# Patient Record
Sex: FEMALE | Race: WHITE | NOT HISPANIC OR LATINO | Employment: UNEMPLOYED | URBAN - NONMETROPOLITAN AREA
[De-identification: names, ages, dates, MRNs, and addresses within clinical notes are randomized per-mention and may not be internally consistent; named-entity substitution may affect disease eponyms.]

---

## 2017-07-19 ENCOUNTER — TRANSCRIBE ORDERS (OUTPATIENT)
Dept: ADMINISTRATIVE | Facility: HOSPITAL | Age: 27
End: 2017-07-19

## 2017-07-19 DIAGNOSIS — N63.20 BREAST MASS, LEFT: Primary | ICD-10-CM

## 2017-09-05 ENCOUNTER — HOSPITAL ENCOUNTER (EMERGENCY)
Facility: HOSPITAL | Age: 27
Discharge: HOME | End: 2017-09-05
Attending: EMERGENCY MEDICINE | Admitting: EMERGENCY MEDICINE

## 2017-09-05 ENCOUNTER — APPOINTMENT (OUTPATIENT)
Dept: ULTRASOUND IMAGING | Facility: HOSPITAL | Age: 27
End: 2017-09-05

## 2017-09-05 VITALS
DIASTOLIC BLOOD PRESSURE: 77 MMHG | BODY MASS INDEX: 24.66 KG/M2 | HEIGHT: 65 IN | OXYGEN SATURATION: 100 % | RESPIRATION RATE: 17 BRPM | SYSTOLIC BLOOD PRESSURE: 124 MMHG | TEMPERATURE: 98.7 F | HEART RATE: 107 BPM | WEIGHT: 148 LBS

## 2017-09-05 DIAGNOSIS — O46.90 VAGINAL BLEEDING DURING PREGNANCY, ANTEPARTUM: Primary | ICD-10-CM

## 2017-09-05 LAB
6-ACETYL MORPHINE: NEGATIVE
ABO GROUP BLD: NORMAL
AMPHET+METHAMPHET UR QL: POSITIVE
B-HCG UR QL: POSITIVE
BACTERIA UR QL AUTO: ABNORMAL /HPF
BARBITURATES UR QL SCN: NEGATIVE
BENZODIAZ UR QL SCN: NEGATIVE
BILIRUB UR QL STRIP: NEGATIVE
BLD GP AB SCN SERPL QL: NEGATIVE
BUPRENORPHINE SERPL-MCNC: NEGATIVE NG/ML
CANNABINOIDS SERPL QL: NEGATIVE
CLARITY UR: ABNORMAL
COCAINE UR QL: NEGATIVE
COLOR UR: YELLOW
GLUCOSE UR STRIP-MCNC: NEGATIVE MG/DL
HCG INTACT+B SERPL-ACNC: ABNORMAL MIU/ML (ref 0–5)
HGB UR QL STRIP.AUTO: ABNORMAL
HYALINE CASTS UR QL AUTO: ABNORMAL /LPF
KETONES UR QL STRIP: NEGATIVE
LEUKOCYTE ESTERASE UR QL STRIP.AUTO: ABNORMAL
METHADONE UR QL SCN: NEGATIVE
NITRITE UR QL STRIP: NEGATIVE
NUMBER OF DOSES: NORMAL
OPIATES UR QL: NEGATIVE
OXYCODONE UR QL SCN: NEGATIVE
PCP UR QL SCN: NEGATIVE
PH UR STRIP.AUTO: 5.5 [PH] (ref 5–8)
PROT UR QL STRIP: NEGATIVE
RBC # UR: ABNORMAL /HPF
REF LAB TEST METHOD: ABNORMAL
RH BLD: POSITIVE
SP GR UR STRIP: 1.01 (ref 1–1.03)
SQUAMOUS #/AREA URNS HPF: ABNORMAL /HPF
UROBILINOGEN UR QL STRIP: ABNORMAL
WBC UR QL AUTO: ABNORMAL /HPF

## 2017-09-05 PROCEDURE — 80307 DRUG TEST PRSMV CHEM ANLYZR: CPT | Performed by: EMERGENCY MEDICINE

## 2017-09-05 PROCEDURE — 76805 OB US >/= 14 WKS SNGL FETUS: CPT | Performed by: RADIOLOGY

## 2017-09-05 PROCEDURE — 76805 OB US >/= 14 WKS SNGL FETUS: CPT

## 2017-09-05 PROCEDURE — 86850 RBC ANTIBODY SCREEN: CPT | Performed by: PHYSICIAN ASSISTANT

## 2017-09-05 PROCEDURE — 86901 BLOOD TYPING SEROLOGIC RH(D): CPT | Performed by: PHYSICIAN ASSISTANT

## 2017-09-05 PROCEDURE — 84702 CHORIONIC GONADOTROPIN TEST: CPT | Performed by: PHYSICIAN ASSISTANT

## 2017-09-05 PROCEDURE — 86900 BLOOD TYPING SEROLOGIC ABO: CPT | Performed by: PHYSICIAN ASSISTANT

## 2017-09-05 PROCEDURE — 87086 URINE CULTURE/COLONY COUNT: CPT | Performed by: EMERGENCY MEDICINE

## 2017-09-05 PROCEDURE — 81001 URINALYSIS AUTO W/SCOPE: CPT | Performed by: EMERGENCY MEDICINE

## 2017-09-05 PROCEDURE — 81025 URINE PREGNANCY TEST: CPT | Performed by: EMERGENCY MEDICINE

## 2017-09-05 PROCEDURE — 99284 EMERGENCY DEPT VISIT MOD MDM: CPT

## 2017-09-06 NOTE — ED PROVIDER NOTES
Subjective   HPI Comments: 26 yo WF, presented to ER w/ two day hx of vaginal bleeding.  Pt admitted to being 18 weeks pregnant.  Pt is A1.  Pt admitted that she is bleeding through less than one pad per day.  Denied NV.        Review of Systems   Constitutional: Negative.  Negative for fever.   HENT: Negative.    Respiratory: Negative.    Cardiovascular: Negative.  Negative for chest pain.   Gastrointestinal: Negative.  Negative for abdominal pain.   Endocrine: Negative.    Genitourinary: Negative.  Negative for dysuria.   Skin: Negative.    Neurological: Negative.    Psychiatric/Behavioral: Negative.    All other systems reviewed and are negative.      No past medical history on file.    Allergies   Allergen Reactions   • Diphenhydramine    • Sulfamethoxazole-Trimethoprim        Past Surgical History:   Procedure Laterality Date   • APPENDECTOMY         No family history on file.    Social History     Social History   • Marital status: Single     Spouse name: N/A   • Number of children: N/A   • Years of education: N/A     Social History Main Topics   • Smoking status: Current Every Day Smoker     Packs/day: 0.50   • Smokeless tobacco: Not on file   • Alcohol use No   • Drug use: No   • Sexual activity: Defer     Other Topics Concern   • Not on file     Social History Narrative   • No narrative on file           Objective   Physical Exam   Constitutional: She is oriented to person, place, and time. She appears well-developed and well-nourished. No distress.   HENT:   Head: Normocephalic and atraumatic.   Nose: Nose normal.   Eyes: Conjunctivae and EOM are normal. Pupils are equal, round, and reactive to light.   Neck: Normal range of motion. Neck supple. No JVD present. No tracheal deviation present.   Cardiovascular: Normal rate, regular rhythm and normal heart sounds.    No murmur heard.  Pulmonary/Chest: Effort normal and breath sounds normal. No respiratory distress. She has no wheezes.   Abdominal: Soft.  Bowel sounds are normal. There is no tenderness.   Musculoskeletal: Normal range of motion. She exhibits no edema or deformity.   Neurological: She is alert and oriented to person, place, and time. No cranial nerve deficit.   Skin: Skin is warm and dry. No rash noted. She is not diaphoretic. No erythema. No pallor.   Psychiatric: She has a normal mood and affect. Her behavior is normal. Thought content normal.   Nursing note and vitals reviewed.      Procedures         ED Course  ED Course   Comment By Time   US [facility]:  Single living intrauterine pregnancy. MOSES Medina 09/05 2237                  [facility]  Number of Diagnoses or Management Options  new and requires workup     Amount and/or Complexity of Data Reviewed  Clinical lab tests: ordered and reviewed  Tests in the radiology section of CPT®: ordered and reviewed    Risk of Complications, Morbidity, and/or Mortality  Presenting problems: moderate  Diagnostic procedures: moderate  Management options: low    Patient Progress  Patient progress: stable      Final diagnoses:   Vaginal bleeding during pregnancy, antepartum            MOSES Medina  09/05/17 2248

## 2017-09-08 LAB — BACTERIA SPEC AEROBE CULT: NO GROWTH

## 2020-07-01 ENCOUNTER — HOSPITAL ENCOUNTER (EMERGENCY)
Facility: HOSPITAL | Age: 30
Discharge: LEFT BEFORE DISCHARGE | End: 2020-07-02

## 2020-07-01 VITALS
DIASTOLIC BLOOD PRESSURE: 61 MMHG | OXYGEN SATURATION: 96 % | HEIGHT: 65 IN | WEIGHT: 182 LBS | HEART RATE: 104 BPM | TEMPERATURE: 97.2 F | BODY MASS INDEX: 30.32 KG/M2 | RESPIRATION RATE: 18 BRPM | SYSTOLIC BLOOD PRESSURE: 152 MMHG

## 2020-07-02 NOTE — ED NOTES
Called pt for reassessment again at this time with no answer.      Gisella Damian RN  07/02/20 0208

## 2022-10-24 ENCOUNTER — APPOINTMENT (OUTPATIENT)
Dept: GENERAL RADIOLOGY | Facility: HOSPITAL | Age: 32
End: 2022-10-24

## 2022-10-24 LAB
ALBUMIN SERPL-MCNC: 4.7 G/DL (ref 3.5–5.2)
ALBUMIN/GLOB SERPL: 1.5 G/DL
ALP SERPL-CCNC: 95 U/L (ref 39–117)
ALT SERPL W P-5'-P-CCNC: 11 U/L (ref 1–33)
ANION GAP SERPL CALCULATED.3IONS-SCNC: 13.8 MMOL/L (ref 5–15)
AST SERPL-CCNC: 24 U/L (ref 1–32)
BASOPHILS # BLD AUTO: 0.09 10*3/MM3 (ref 0–0.2)
BASOPHILS NFR BLD AUTO: 1.3 % (ref 0–1.5)
BILIRUB SERPL-MCNC: 0.2 MG/DL (ref 0–1.2)
BUN SERPL-MCNC: 10 MG/DL (ref 6–20)
BUN/CREAT SERPL: 10.6 (ref 7–25)
CALCIUM SPEC-SCNC: 9.6 MG/DL (ref 8.6–10.5)
CHLORIDE SERPL-SCNC: 101 MMOL/L (ref 98–107)
CO2 SERPL-SCNC: 22.2 MMOL/L (ref 22–29)
CREAT SERPL-MCNC: 0.94 MG/DL (ref 0.57–1)
DEPRECATED RDW RBC AUTO: 39 FL (ref 37–54)
EGFRCR SERPLBLD CKD-EPI 2021: 83.9 ML/MIN/1.73
EOSINOPHIL # BLD AUTO: 0.49 10*3/MM3 (ref 0–0.4)
EOSINOPHIL NFR BLD AUTO: 7.2 % (ref 0.3–6.2)
ERYTHROCYTE [DISTWIDTH] IN BLOOD BY AUTOMATED COUNT: 15.5 % (ref 12.3–15.4)
ETHANOL BLD-MCNC: <10 MG/DL (ref 0–10)
ETHANOL UR QL: <0.01 %
GLOBULIN UR ELPH-MCNC: 3.2 GM/DL
GLUCOSE SERPL-MCNC: 88 MG/DL (ref 65–99)
HCT VFR BLD AUTO: 33.5 % (ref 34–46.6)
HGB BLD-MCNC: 9.4 G/DL (ref 12–15.9)
HOLD SPECIMEN: NORMAL
HOLD SPECIMEN: NORMAL
HYPOCHROMIA BLD QL: NORMAL
IMM GRANULOCYTES # BLD AUTO: 0.02 10*3/MM3 (ref 0–0.05)
IMM GRANULOCYTES NFR BLD AUTO: 0.3 % (ref 0–0.5)
LIPASE SERPL-CCNC: 19 U/L (ref 13–60)
LYMPHOCYTES # BLD AUTO: 1.28 10*3/MM3 (ref 0.7–3.1)
LYMPHOCYTES NFR BLD AUTO: 18.8 % (ref 19.6–45.3)
MCH RBC QN AUTO: 19.8 PG (ref 26.6–33)
MCHC RBC AUTO-ENTMCNC: 28.1 G/DL (ref 31.5–35.7)
MCV RBC AUTO: 70.7 FL (ref 79–97)
MICROCYTES BLD QL: NORMAL
MONOCYTES # BLD AUTO: 0.42 10*3/MM3 (ref 0.1–0.9)
MONOCYTES NFR BLD AUTO: 6.2 % (ref 5–12)
NEUTROPHILS NFR BLD AUTO: 4.51 10*3/MM3 (ref 1.7–7)
NEUTROPHILS NFR BLD AUTO: 66.2 % (ref 42.7–76)
NRBC BLD AUTO-RTO: 0 /100 WBC (ref 0–0.2)
PLAT MORPH BLD: NORMAL
PLATELET # BLD AUTO: 254 10*3/MM3 (ref 140–450)
PMV BLD AUTO: 12 FL (ref 6–12)
POLYCHROMASIA BLD QL SMEAR: NORMAL
POTASSIUM SERPL-SCNC: 3.7 MMOL/L (ref 3.5–5.2)
PROT SERPL-MCNC: 7.9 G/DL (ref 6–8.5)
QT INTERVAL: 344 MS
QTC INTERVAL: 446 MS
RBC # BLD AUTO: 4.74 10*6/MM3 (ref 3.77–5.28)
SODIUM SERPL-SCNC: 137 MMOL/L (ref 136–145)
STOMATOCYTES BLD QL SMEAR: NORMAL
TROPONIN T SERPL-MCNC: <0.01 NG/ML (ref 0–0.03)
WBC NRBC COR # BLD: 6.81 10*3/MM3 (ref 3.4–10.8)
WHOLE BLOOD HOLD COAG: NORMAL
WHOLE BLOOD HOLD SPECIMEN: NORMAL

## 2022-10-24 PROCEDURE — 80053 COMPREHEN METABOLIC PANEL: CPT | Performed by: PHYSICIAN ASSISTANT

## 2022-10-24 PROCEDURE — 85007 BL SMEAR W/DIFF WBC COUNT: CPT | Performed by: PHYSICIAN ASSISTANT

## 2022-10-24 PROCEDURE — 82077 ASSAY SPEC XCP UR&BREATH IA: CPT | Performed by: PHYSICIAN ASSISTANT

## 2022-10-24 PROCEDURE — 85025 COMPLETE CBC W/AUTO DIFF WBC: CPT | Performed by: PHYSICIAN ASSISTANT

## 2022-10-24 PROCEDURE — 84484 ASSAY OF TROPONIN QUANT: CPT | Performed by: PHYSICIAN ASSISTANT

## 2022-10-24 PROCEDURE — 71045 X-RAY EXAM CHEST 1 VIEW: CPT

## 2022-10-24 PROCEDURE — 93005 ELECTROCARDIOGRAM TRACING: CPT | Performed by: PHYSICIAN ASSISTANT

## 2022-10-24 PROCEDURE — 36415 COLL VENOUS BLD VENIPUNCTURE: CPT

## 2022-10-24 PROCEDURE — 83690 ASSAY OF LIPASE: CPT | Performed by: PHYSICIAN ASSISTANT

## 2022-10-24 PROCEDURE — 99284 EMERGENCY DEPT VISIT MOD MDM: CPT

## 2022-10-24 RX ORDER — SODIUM CHLORIDE 0.9 % (FLUSH) 0.9 %
10 SYRINGE (ML) INJECTION AS NEEDED
Status: DISCONTINUED | OUTPATIENT
Start: 2022-10-24 | End: 2022-10-25 | Stop reason: HOSPADM

## 2022-10-24 RX ORDER — ASPIRIN 81 MG/1
324 TABLET, CHEWABLE ORAL ONCE
Status: DISCONTINUED | OUTPATIENT
Start: 2022-10-24 | End: 2022-10-25 | Stop reason: HOSPADM

## 2022-10-25 ENCOUNTER — HOSPITAL ENCOUNTER (EMERGENCY)
Facility: HOSPITAL | Age: 32
Discharge: HOME | End: 2022-10-25
Attending: STUDENT IN AN ORGANIZED HEALTH CARE EDUCATION/TRAINING PROGRAM | Admitting: STUDENT IN AN ORGANIZED HEALTH CARE EDUCATION/TRAINING PROGRAM

## 2022-10-25 VITALS
OXYGEN SATURATION: 99 % | HEART RATE: 98 BPM | DIASTOLIC BLOOD PRESSURE: 76 MMHG | TEMPERATURE: 98 F | RESPIRATION RATE: 18 BRPM | SYSTOLIC BLOOD PRESSURE: 122 MMHG | HEIGHT: 65 IN | WEIGHT: 157 LBS | BODY MASS INDEX: 26.16 KG/M2

## 2022-10-25 DIAGNOSIS — R13.10 PAINFUL SWALLOWING: Primary | ICD-10-CM

## 2022-10-25 LAB — TROPONIN T SERPL-MCNC: <0.01 NG/ML (ref 0–0.03)

## 2022-10-25 PROCEDURE — 84484 ASSAY OF TROPONIN QUANT: CPT | Performed by: PHYSICIAN ASSISTANT

## 2022-10-25 RX ORDER — LIDOCAINE HYDROCHLORIDE 20 MG/ML
15 SOLUTION OROPHARYNGEAL ONCE
Status: COMPLETED | OUTPATIENT
Start: 2022-10-25 | End: 2022-10-25

## 2022-10-25 RX ORDER — ALUMINA, MAGNESIA, AND SIMETHICONE 2400; 2400; 240 MG/30ML; MG/30ML; MG/30ML
15 SUSPENSION ORAL ONCE
Status: COMPLETED | OUTPATIENT
Start: 2022-10-25 | End: 2022-10-25

## 2022-10-25 RX ORDER — PANTOPRAZOLE SODIUM 40 MG/1
40 TABLET, DELAYED RELEASE ORAL DAILY
Qty: 30 TABLET | Refills: 0 | Status: SHIPPED | OUTPATIENT
Start: 2022-10-25

## 2022-10-25 RX ORDER — SIMETHICONE 40MG/0.6ML
5 SUSPENSION, DROPS(FINAL DOSAGE FORM)(ML) ORAL 2 TIMES DAILY PRN
Qty: 355 ML | Refills: 0 | Status: SHIPPED | OUTPATIENT
Start: 2022-10-25

## 2022-10-25 RX ADMIN — ALUMINUM HYDROXIDE, MAGNESIUM HYDROXIDE, AND DIMETHICONE 15 ML: 400; 400; 40 SUSPENSION ORAL at 00:43

## 2022-10-25 RX ADMIN — LIDOCAINE HYDROCHLORIDE 15 ML: 20 SOLUTION ORAL; TOPICAL at 00:43

## 2022-10-25 NOTE — ED NOTES
MEDICAL SCREENING:    Reason for Visit: chest pain; sensation of food stuck in esophagus x 4 days     Patient initially seen in triage.  The patient was advised further evaluation and diagnostic testing will be needed, some of the treatment and testing will be initiated in the lobby in order to begin the process.  The patient will be returned to the waiting area for the time being and possibly be re-assessed by a subsequent ED provider.  The patient will be brought back to the treatment area in as timely manner as possible.       Deb White PA  10/24/22 4815

## 2022-10-25 NOTE — ED NOTES
Updated pt on reason for delay at this time; awaiting treatment room due to high pt volume, understanding verbalized.

## 2022-10-25 NOTE — ED PROVIDER NOTES
"Subjective     History provided by:  Patient  Difficulty Swallowing  Location:  Mid sternal  Quality:  \"feels like food is getting stuck\"  Severity:  Mild  Onset quality:  Gradual  Timing:  Intermittent  Progression:  Worsening  Chronicity:  Recurrent  Associated symptoms: no abdominal pain, no chest pain and no fever        Review of Systems   Constitutional: Negative.  Negative for fever.   HENT: Positive for trouble swallowing.    Respiratory: Negative.    Cardiovascular: Negative.  Negative for chest pain.   Gastrointestinal: Negative.  Negative for abdominal pain.   Endocrine: Negative.    Genitourinary: Negative.  Negative for dysuria.   Skin: Negative.    Neurological: Negative.    Psychiatric/Behavioral: Negative.    All other systems reviewed and are negative.      No past medical history on file.    Allergies   Allergen Reactions   • Diphenhydramine    • Sulfamethoxazole-Trimethoprim        Past Surgical History:   Procedure Laterality Date   • APPENDECTOMY         No family history on file.    Social History     Socioeconomic History   • Marital status: Single   Tobacco Use   • Smoking status: Every Day     Packs/day: 0.50     Types: Cigarettes   Substance and Sexual Activity   • Alcohol use: No   • Drug use: No   • Sexual activity: Defer           Objective   Physical Exam  Vitals and nursing note reviewed.   Constitutional:       General: She is not in acute distress.     Appearance: She is well-developed. She is not diaphoretic.   HENT:      Head: Normocephalic and atraumatic.      Right Ear: External ear normal.      Left Ear: External ear normal.      Nose: Nose normal.   Eyes:      Conjunctiva/sclera: Conjunctivae normal.      Pupils: Pupils are equal, round, and reactive to light.   Neck:      Vascular: No JVD.      Trachea: No tracheal deviation.   Cardiovascular:      Rate and Rhythm: Normal rate and regular rhythm.      Heart sounds: No murmur heard.  Pulmonary:      Effort: Pulmonary effort is " normal. No respiratory distress.      Breath sounds: No wheezing.   Abdominal:      Palpations: Abdomen is soft.      Tenderness: There is no abdominal tenderness.   Musculoskeletal:         General: No deformity. Normal range of motion.      Cervical back: Normal range of motion and neck supple.   Skin:     General: Skin is warm and dry.      Coloration: Skin is not pale.      Findings: No erythema or rash.   Neurological:      Mental Status: She is alert and oriented to person, place, and time.      Cranial Nerves: No cranial nerve deficit.   Psychiatric:         Behavior: Behavior normal.         Thought Content: Thought content normal.         Procedures           ED Course  ED Course as of 10/25/22 0110   Mon Oct 24, 2022   2235 EKG at 2232 sinus tachycardia 101 bpm, , cures 90, QTc 446, regular axis, no significant ST deviation or T wave abnormalities concerning for acute ischemia. [KP]   e Oct 25, 2022   0030 CXR rad interpreted:  No acute cardiopulmonary findings.    [RB]   0107 Patient experienced relief with a GI cocktail.  Patient will be discharged to follow-up with general surgery for outpatient EGD.  Patient will also be started on a PPI for symptomatic control. [RB]      ED Course User Index  [KP] Alexsander Chowdhury MD  [RB] Usman Salter II, PA                                           MDM  Number of Diagnoses or Management Options  Painful swallowing: new and requires workup     Amount and/or Complexity of Data Reviewed  Clinical lab tests: ordered and reviewed  Tests in the radiology section of CPT®: ordered and reviewed  Decide to obtain previous medical records or to obtain history from someone other than the patient: yes    Risk of Complications, Morbidity, and/or Mortality  Presenting problems: low  Diagnostic procedures: low  Management options: low    Patient Progress  Patient progress: stable      Final diagnoses:   Painful swallowing       ED Disposition  ED Disposition     ED  Disposition   Discharge    Condition   Stable    Comment   --             Jayro Bar MD  [address] [id number] [phone]    Schedule an appointment as soon as possible for a visit            Medication List      New Prescriptions    Mylanta Coat & Cool 1200-270-80 MG/10ML suspension  Generic drug: Catrachito Carb-Mag Hydrox-Simeth  Take 5 mL by mouth 2 (Two) Times a Day As Needed (indigestion).     pantoprazole 40 MG EC tablet  Commonly known as: PROTONIX  Take 1 tablet by mouth Daily.           Where to Get Your Medications      These medications were sent to [id number] - [address] - [phone] PH - [phone] [address] [address]    Phone: [phone]   · Mylanta Coat & Cool 1200-270-80 MG/10ML suspension  · pantoprazole 40 MG EC tablet          Usman Salter II, PA  10/25/22 0110

## 2022-10-27 ENCOUNTER — OFFICE VISIT (OUTPATIENT)
Dept: SURGERY | Facility: CLINIC | Age: 32
End: 2022-10-27

## 2022-10-27 VITALS — WEIGHT: 150 LBS | HEIGHT: 65 IN | BODY MASS INDEX: 24.99 KG/M2

## 2022-10-27 DIAGNOSIS — R13.19 ESOPHAGEAL DYSPHAGIA: Primary | ICD-10-CM

## 2022-10-27 PROCEDURE — 99203 OFFICE O/P NEW LOW 30 MIN: CPT | Performed by: SURGERY

## 2022-10-27 RX ORDER — BUPRENORPHINE HYDROCHLORIDE AND NALOXONE HYDROCHLORIDE DIHYDRATE 8; 2 MG/1; MG/1
TABLET SUBLINGUAL
COMMUNITY
Start: 2022-10-25

## 2022-10-27 NOTE — PROGRESS NOTES
Subjective   Julieta Beasley is a 32 y.o. female.     Chief Complaint: dysphagia    History of Present Illness She is a 33 yo who had trouble swallowing and was seen in the ER a few days ago. She has pain and feels like food gets stuck at times. She is on protonix now.    The following portions of the patient's history were reviewed and updated as appropriate: current medications, past family history, past medical history, past social history, past surgical history and problem list.    Review of Systems   Constitutional: Negative for activity change, appetite change, chills, fever and unexpected weight change.   HENT: Positive for trouble swallowing. Negative for congestion, facial swelling and sore throat.    Eyes: Negative for photophobia and visual disturbance.   Respiratory: Negative for chest tightness, shortness of breath and wheezing.    Cardiovascular: Negative for chest pain, palpitations and leg swelling.   Gastrointestinal: Negative for abdominal distention, abdominal pain, anal bleeding, blood in stool, constipation, diarrhea, nausea, rectal pain and vomiting.   Endocrine: Negative for cold intolerance, heat intolerance, polydipsia and polyuria.   Genitourinary: Negative for difficulty urinating, dysuria, flank pain and urgency.   Musculoskeletal: Negative for back pain and myalgias.   Skin: Negative for rash and wound.   Allergic/Immunologic: Negative for immunocompromised state.   Neurological: Negative for dizziness, seizures, syncope, light-headedness, numbness and headaches.   Hematological: Negative for adenopathy. Does not bruise/bleed easily.   Psychiatric/Behavioral: Negative for behavioral problems and confusion. The patient is not nervous/anxious.        Objective   Physical Exam  Vitals reviewed.   Constitutional:       General: She is not in acute distress.     Appearance: She is well-developed. She is not ill-appearing.   HENT:      Head: Normocephalic. No laceration. Hair is normal.       Right Ear: Hearing and ear canal normal.      Left Ear: Hearing and ear canal normal.      Nose: Nose normal.      Right Sinus: No maxillary sinus tenderness or frontal sinus tenderness.      Left Sinus: No maxillary sinus tenderness or frontal sinus tenderness.   Eyes:      General: Lids are normal.      Conjunctiva/sclera: Conjunctivae normal.      Pupils: Pupils are equal, round, and reactive to light.   Neck:      Thyroid: No thyroid mass or thyromegaly.      Vascular: No JVD.      Trachea: No tracheal tenderness or tracheal deviation.   Cardiovascular:      Rate and Rhythm: Normal rate and regular rhythm.      Heart sounds: No murmur heard.    No gallop.   Pulmonary:      Effort: Pulmonary effort is normal.      Breath sounds: Normal breath sounds. No stridor. No wheezing.   Chest:      Chest wall: No tenderness.   Abdominal:      General: Bowel sounds are normal. There is no distension.      Palpations: Abdomen is soft. There is no mass.      Tenderness: There is no abdominal tenderness. There is no guarding or rebound.      Hernia: No hernia is present.   Musculoskeletal:         General: No deformity.      Cervical back: Normal range of motion.   Lymphadenopathy:      Cervical: No cervical adenopathy.      Upper Body:      Right upper body: No supraclavicular adenopathy.      Left upper body: No supraclavicular adenopathy.   Skin:     General: Skin is warm and dry.      Coloration: Skin is not pale.      Findings: No erythema or rash.   Neurological:      Mental Status: She is alert and oriented to person, place, and time.      Motor: No abnormal muscle tone.   Psychiatric:         Behavior: Behavior normal.         Thought Content: Thought content normal.         No past medical history on file.    No family history on file.    Social History     Tobacco Use   • Smoking status: Every Day     Packs/day: 0.50     Types: Cigarettes   Substance Use Topics   • Alcohol use: No   • Drug use: No       Past Surgical  History:   Procedure Laterality Date   • APPENDECTOMY         Current Outpatient Medications   Medication Instructions   • Catrachito Carb-Mag Hydrox-Simeth (Mylanta Coat & Cool) 1200-270-80 MG/10ML suspension 5 mL, Oral, 2 Times Daily PRN   • pantoprazole (PROTONIX) 40 mg, Oral, Daily         Assessment & Plan   Diagnoses and all orders for this visit:    1. Esophageal dysphagia (Primary)    Schedule EGD

## 2022-10-28 ENCOUNTER — ANESTHESIA EVENT (OUTPATIENT)
Dept: PERIOP | Facility: HOSPITAL | Age: 32
End: 2022-10-28

## 2022-10-28 ENCOUNTER — HOSPITAL ENCOUNTER (OUTPATIENT)
Facility: HOSPITAL | Age: 32
Setting detail: HOSPITAL OUTPATIENT SURGERY
Discharge: HOME | End: 2022-10-28
Attending: SURGERY | Admitting: SURGERY

## 2022-10-28 ENCOUNTER — ANESTHESIA (OUTPATIENT)
Dept: PERIOP | Facility: HOSPITAL | Age: 32
End: 2022-10-28

## 2022-10-28 VITALS
SYSTOLIC BLOOD PRESSURE: 110 MMHG | HEART RATE: 97 BPM | TEMPERATURE: 98 F | BODY MASS INDEX: 24.99 KG/M2 | HEIGHT: 65 IN | WEIGHT: 150 LBS | DIASTOLIC BLOOD PRESSURE: 53 MMHG | OXYGEN SATURATION: 100 % | RESPIRATION RATE: 18 BRPM

## 2022-10-28 DIAGNOSIS — R13.19 ESOPHAGEAL DYSPHAGIA: ICD-10-CM

## 2022-10-28 LAB
B-HCG UR QL: NEGATIVE
EXPIRATION DATE: NORMAL
INTERNAL NEGATIVE CONTROL: NEGATIVE
INTERNAL POSITIVE CONTROL: POSITIVE
Lab: NORMAL

## 2022-10-28 PROCEDURE — 25010000002 MIDAZOLAM PER 1MG: Performed by: NURSE ANESTHETIST, CERTIFIED REGISTERED

## 2022-10-28 PROCEDURE — 25010000002 PROPOFOL 10 MG/ML EMULSION: Performed by: NURSE ANESTHETIST, CERTIFIED REGISTERED

## 2022-10-28 PROCEDURE — 43248 EGD GUIDE WIRE INSERTION: CPT | Performed by: SURGERY

## 2022-10-28 PROCEDURE — 81025 URINE PREGNANCY TEST: CPT | Performed by: ANESTHESIOLOGY

## 2022-10-28 RX ORDER — MIDAZOLAM HYDROCHLORIDE 2 MG/2ML
INJECTION, SOLUTION INTRAMUSCULAR; INTRAVENOUS AS NEEDED
Status: DISCONTINUED | OUTPATIENT
Start: 2022-10-28 | End: 2022-10-28 | Stop reason: SURG

## 2022-10-28 RX ORDER — ONDANSETRON 2 MG/ML
4 INJECTION INTRAMUSCULAR; INTRAVENOUS AS NEEDED
Status: DISCONTINUED | OUTPATIENT
Start: 2022-10-28 | End: 2022-10-28 | Stop reason: HOSPADM

## 2022-10-28 RX ORDER — PROPOFOL 10 MG/ML
VIAL (ML) INTRAVENOUS AS NEEDED
Status: DISCONTINUED | OUTPATIENT
Start: 2022-10-28 | End: 2022-10-28 | Stop reason: SURG

## 2022-10-28 RX ORDER — SODIUM CHLORIDE 0.9 % (FLUSH) 0.9 %
10 SYRINGE (ML) INJECTION AS NEEDED
Status: DISCONTINUED | OUTPATIENT
Start: 2022-10-28 | End: 2022-10-28 | Stop reason: HOSPADM

## 2022-10-28 RX ORDER — FLUCONAZOLE 100 MG/1
100 TABLET ORAL DAILY
Qty: 30 TABLET | Refills: 0 | Status: SHIPPED | OUTPATIENT
Start: 2022-10-28 | End: 2022-11-07

## 2022-10-28 RX ORDER — SODIUM CHLORIDE 0.9 % (FLUSH) 0.9 %
10 SYRINGE (ML) INJECTION EVERY 12 HOURS SCHEDULED
Status: DISCONTINUED | OUTPATIENT
Start: 2022-10-28 | End: 2022-10-28 | Stop reason: HOSPADM

## 2022-10-28 RX ORDER — SODIUM CHLORIDE, SODIUM LACTATE, POTASSIUM CHLORIDE, CALCIUM CHLORIDE 600; 310; 30; 20 MG/100ML; MG/100ML; MG/100ML; MG/100ML
125 INJECTION, SOLUTION INTRAVENOUS ONCE
Status: DISCONTINUED | OUTPATIENT
Start: 2022-10-28 | End: 2022-10-28 | Stop reason: HOSPADM

## 2022-10-28 RX ORDER — LIDOCAINE HYDROCHLORIDE 20 MG/ML
INJECTION, SOLUTION EPIDURAL; INFILTRATION; INTRACAUDAL; PERINEURAL AS NEEDED
Status: DISCONTINUED | OUTPATIENT
Start: 2022-10-28 | End: 2022-10-28 | Stop reason: SURG

## 2022-10-28 RX ORDER — MEPERIDINE HYDROCHLORIDE 25 MG/ML
12.5 INJECTION INTRAMUSCULAR; INTRAVENOUS; SUBCUTANEOUS
Status: DISCONTINUED | OUTPATIENT
Start: 2022-10-28 | End: 2022-10-28 | Stop reason: HOSPADM

## 2022-10-28 RX ORDER — MIDAZOLAM HYDROCHLORIDE 1 MG/ML
1 INJECTION INTRAMUSCULAR; INTRAVENOUS
Status: DISCONTINUED | OUTPATIENT
Start: 2022-10-28 | End: 2022-10-28 | Stop reason: HOSPADM

## 2022-10-28 RX ORDER — SODIUM CHLORIDE, SODIUM LACTATE, POTASSIUM CHLORIDE, CALCIUM CHLORIDE 600; 310; 30; 20 MG/100ML; MG/100ML; MG/100ML; MG/100ML
INJECTION, SOLUTION INTRAVENOUS CONTINUOUS PRN
Status: DISCONTINUED | OUTPATIENT
Start: 2022-10-28 | End: 2022-10-28 | Stop reason: SURG

## 2022-10-28 RX ORDER — IPRATROPIUM BROMIDE AND ALBUTEROL SULFATE 2.5; .5 MG/3ML; MG/3ML
3 SOLUTION RESPIRATORY (INHALATION) ONCE AS NEEDED
Status: DISCONTINUED | OUTPATIENT
Start: 2022-10-28 | End: 2022-10-28 | Stop reason: HOSPADM

## 2022-10-28 RX ORDER — FENTANYL CITRATE 50 UG/ML
50 INJECTION, SOLUTION INTRAMUSCULAR; INTRAVENOUS
Status: DISCONTINUED | OUTPATIENT
Start: 2022-10-28 | End: 2022-10-28 | Stop reason: HOSPADM

## 2022-10-28 RX ORDER — KETOROLAC TROMETHAMINE 30 MG/ML
30 INJECTION, SOLUTION INTRAMUSCULAR; INTRAVENOUS EVERY 6 HOURS PRN
Status: DISCONTINUED | OUTPATIENT
Start: 2022-10-28 | End: 2022-10-28 | Stop reason: HOSPADM

## 2022-10-28 RX ORDER — OXYCODONE HYDROCHLORIDE AND ACETAMINOPHEN 5; 325 MG/1; MG/1
1 TABLET ORAL ONCE AS NEEDED
Status: DISCONTINUED | OUTPATIENT
Start: 2022-10-28 | End: 2022-10-28 | Stop reason: HOSPADM

## 2022-10-28 RX ORDER — SODIUM CHLORIDE, SODIUM LACTATE, POTASSIUM CHLORIDE, CALCIUM CHLORIDE 600; 310; 30; 20 MG/100ML; MG/100ML; MG/100ML; MG/100ML
100 INJECTION, SOLUTION INTRAVENOUS ONCE AS NEEDED
Status: DISCONTINUED | OUTPATIENT
Start: 2022-10-28 | End: 2022-10-28 | Stop reason: HOSPADM

## 2022-10-28 RX ADMIN — PROPOFOL 120 MG: 10 INJECTION, EMULSION INTRAVENOUS at 10:36

## 2022-10-28 RX ADMIN — PROPOFOL 75 MCG/KG/MIN: 10 INJECTION, EMULSION INTRAVENOUS at 10:36

## 2022-10-28 RX ADMIN — SODIUM CHLORIDE, POTASSIUM CHLORIDE, SODIUM LACTATE AND CALCIUM CHLORIDE: 600; 310; 30; 20 INJECTION, SOLUTION INTRAVENOUS at 10:33

## 2022-10-28 RX ADMIN — MIDAZOLAM HYDROCHLORIDE 1 MG: 1 INJECTION, SOLUTION INTRAMUSCULAR; INTRAVENOUS at 10:36

## 2022-10-28 RX ADMIN — MIDAZOLAM HYDROCHLORIDE 1 MG: 1 INJECTION, SOLUTION INTRAMUSCULAR; INTRAVENOUS at 10:44

## 2022-10-28 RX ADMIN — LIDOCAINE HYDROCHLORIDE 60 MG: 20 INJECTION, SOLUTION EPIDURAL; INFILTRATION; INTRACAUDAL; PERINEURAL at 10:36

## 2022-10-28 NOTE — ANESTHESIA PREPROCEDURE EVALUATION
Anesthesia Evaluation     Patient summary reviewed and Nursing notes reviewed   no history of anesthetic complications:  NPO Solid Status: > 8 hours  NPO Liquid Status: > 8 hours           Airway   Mallampati: I  TM distance: >3 FB  Neck ROM: full  No difficulty expected  Dental    (+) poor dentition    Pulmonary - normal exam   (+) a smoker Current,   Cardiovascular - negative cardio ROS and normal exam        Neuro/Psych- negative ROS  GI/Hepatic/Renal/Endo - negative ROS     Musculoskeletal (-) negative ROS    Abdominal  - normal exam    Bowel sounds: normal.   Substance History   (+) drug use     OB/GYN negative ob/gyn ROS         Other                        Anesthesia Plan    ASA 2     general     intravenous induction     Anesthetic plan, risks, benefits, and alternatives have been provided, discussed and informed consent has been obtained with: patient.        CODE STATUS:

## 2022-10-28 NOTE — ANESTHESIA POSTPROCEDURE EVALUATION
Patient: Julieta Beasley    Procedure Summary     Date: 10/28/22 Room / Location: [facility] OR [address] [place] [address] OR    Anesthesia Start: 1033 Anesthesia Stop: 1050    Procedure: ESOPHAGOGASTRODUODENOSCOPY (Esophagus) Diagnosis:       Esophageal dysphagia      (Esophageal dysphagia [R13.19])    Surgeons: Jayro Bar MD Provider: Tony Ty MD    Anesthesia Type: general ASA Status: 2          Anesthesia Type: general    Vitals  No vitals data found for the desired time range.          Post Anesthesia Care and Evaluation    Patient location during evaluation: PHASE II  Patient participation: complete - patient participated  Level of consciousness: awake and alert  Pain score: 0  Pain management: adequate    Airway patency: patent  Anesthetic complications: No anesthetic complications    Cardiovascular status: acceptable  Respiratory status: acceptable  Hydration status: acceptable

## 2022-10-31 LAB — REF LAB TEST METHOD: NORMAL

## 2023-05-11 ENCOUNTER — TRANSCRIBE ORDERS (OUTPATIENT)
Dept: ADMINISTRATIVE | Facility: HOSPITAL | Age: 33
End: 2023-05-11
Payer: COMMERCIAL

## 2023-05-11 ENCOUNTER — HOSPITAL ENCOUNTER (OUTPATIENT)
Dept: GENERAL RADIOLOGY | Facility: HOSPITAL | Age: 33
Discharge: HOME | End: 2023-05-11
Payer: COMMERCIAL

## 2023-05-11 DIAGNOSIS — M25.561 RIGHT KNEE PAIN, UNSPECIFIED CHRONICITY: ICD-10-CM

## 2023-05-11 DIAGNOSIS — M25.561 RIGHT KNEE PAIN, UNSPECIFIED CHRONICITY: Primary | ICD-10-CM

## 2023-05-11 PROCEDURE — 73562 X-RAY EXAM OF KNEE 3: CPT

## 2023-05-11 PROCEDURE — 73562 X-RAY EXAM OF KNEE 3: CPT | Performed by: RADIOLOGY

## 2023-05-24 ENCOUNTER — LAB (OUTPATIENT)
Dept: LAB | Facility: HOSPITAL | Age: 33
End: 2023-05-24
Payer: COMMERCIAL

## 2023-05-24 ENCOUNTER — TRANSCRIBE ORDERS (OUTPATIENT)
Dept: ADMINISTRATIVE | Facility: HOSPITAL | Age: 33
End: 2023-05-24
Payer: COMMERCIAL

## 2023-05-24 DIAGNOSIS — D50.9 IRON DEFICIENCY ANEMIA, UNSPECIFIED IRON DEFICIENCY ANEMIA TYPE: Primary | ICD-10-CM

## 2023-05-24 DIAGNOSIS — D50.9 IRON DEFICIENCY ANEMIA, UNSPECIFIED IRON DEFICIENCY ANEMIA TYPE: ICD-10-CM

## 2023-05-24 PROCEDURE — 82728 ASSAY OF FERRITIN: CPT

## 2023-05-24 PROCEDURE — 83540 ASSAY OF IRON: CPT

## 2023-05-24 PROCEDURE — 36415 COLL VENOUS BLD VENIPUNCTURE: CPT

## 2023-05-24 PROCEDURE — 84466 ASSAY OF TRANSFERRIN: CPT

## 2023-05-25 ENCOUNTER — TRANSCRIBE ORDERS (OUTPATIENT)
Dept: ONCOLOGY | Facility: HOSPITAL | Age: 33
End: 2023-05-25
Payer: COMMERCIAL

## 2023-05-25 LAB
FERRITIN SERPL-MCNC: 3.4 NG/ML (ref 13–150)
IRON 24H UR-MRATE: 14 MCG/DL (ref 37–145)
IRON SATN MFR SERPL: 2 % (ref 20–50)
TIBC SERPL-MCNC: 571 MCG/DL (ref 298–536)
TRANSFERRIN SERPL-MCNC: 383 MG/DL (ref 200–360)

## 2023-06-01 PROBLEM — D50.9 IRON DEFICIENCY ANEMIA, UNSPECIFIED: Status: ACTIVE | Noted: 2023-06-01

## 2023-06-02 ENCOUNTER — INFUSION (OUTPATIENT)
Dept: ONCOLOGY | Facility: HOSPITAL | Age: 33
End: 2023-06-02

## 2023-06-02 VITALS
TEMPERATURE: 98 F | HEART RATE: 76 BPM | SYSTOLIC BLOOD PRESSURE: 110 MMHG | OXYGEN SATURATION: 99 % | RESPIRATION RATE: 18 BRPM | DIASTOLIC BLOOD PRESSURE: 48 MMHG

## 2023-06-02 DIAGNOSIS — D50.9 IRON DEFICIENCY ANEMIA, UNSPECIFIED IRON DEFICIENCY ANEMIA TYPE: Primary | ICD-10-CM

## 2023-06-02 PROCEDURE — 25010000002 IRON SUCROSE PER 1 MG: Performed by: NURSE PRACTITIONER

## 2023-06-02 PROCEDURE — 96365 THER/PROPH/DIAG IV INF INIT: CPT

## 2023-06-02 PROCEDURE — 96374 THER/PROPH/DIAG INJ IV PUSH: CPT

## 2023-06-02 RX ADMIN — IRON SUCROSE 200 MG: 20 INJECTION, SOLUTION INTRAVENOUS at 10:55

## 2023-06-15 ENCOUNTER — OFFICE VISIT (OUTPATIENT)
Dept: PSYCHIATRY | Facility: CLINIC | Age: 33
End: 2023-06-15
Payer: COMMERCIAL

## 2023-06-15 DIAGNOSIS — F41.1 GENERALIZED ANXIETY DISORDER: ICD-10-CM

## 2023-06-15 DIAGNOSIS — Z79.899 MEDICATION MANAGEMENT: ICD-10-CM

## 2023-06-15 DIAGNOSIS — F11.20 OPIOID TYPE DEPENDENCE, CONTINUOUS: Primary | ICD-10-CM

## 2023-06-15 LAB
EXTERNAL AMPHETAMINE SCREEN URINE: NEGATIVE
EXTERNAL BENZODIAZEPINE SCREEN URINE: NEGATIVE
EXTERNAL BUPRENORPHINE SCREEN URINE: POSITIVE
EXTERNAL COCAINE SCREEN URINE: NEGATIVE
EXTERNAL MDMA: NEGATIVE
EXTERNAL METHADONE SCREEN URINE: NEGATIVE
EXTERNAL METHAMPHETAMINE SCREEN URINE: NEGATIVE
EXTERNAL OPIATES SCREEN URINE: NEGATIVE
EXTERNAL OXYCODONE SCREEN URINE: NEGATIVE
EXTERNAL THC SCREEN URINE: NEGATIVE

## 2023-06-15 RX ORDER — GABAPENTIN 600 MG
600 TABLET ORAL 3 TIMES DAILY
COMMUNITY
Start: 2014-04-01

## 2023-06-15 RX ORDER — NALOXONE HYDROCHLORIDE 4 MG/.1ML
1 SPRAY NASAL AS NEEDED
Qty: 2 EACH | Refills: 2 | Status: SHIPPED | OUTPATIENT
Start: 2023-06-15

## 2023-06-15 RX ORDER — BUPRENORPHINE HYDROCHLORIDE AND NALOXONE HYDROCHLORIDE DIHYDRATE 8; 2 MG/1; MG/1
2 TABLET SUBLINGUAL DAILY
Qty: 56 TABLET | Refills: 0 | Status: SHIPPED | OUTPATIENT
Start: 2023-06-15

## 2023-06-15 RX ORDER — IBUPROFEN 200 MG
4 TABLET ORAL EVERY 12 HOURS
COMMUNITY
Start: 2014-03-31

## 2023-06-15 RX ORDER — CLONIDINE HYDROCHLORIDE 0.1 MG/1
0.1 TABLET ORAL 2 TIMES DAILY PRN
Qty: 60 TABLET | Refills: 0 | Status: SHIPPED | OUTPATIENT
Start: 2023-06-15

## 2023-06-15 RX ORDER — FLUOXETINE HYDROCHLORIDE 20 MG/1
20 CAPSULE ORAL DAILY
Qty: 30 CAPSULE | Refills: 0 | Status: SHIPPED | OUTPATIENT
Start: 2023-06-15 | End: 2024-06-14

## 2023-06-15 NOTE — PROGRESS NOTES
This provider is located at [facility]. The Patient is seen remotely located at the [facility] ([facility]) using Video. Patient is being seen via telehealth and confirm that they are in a secure environment for this session. The patient's condition being diagnosed/treated is appropriate for telemedicine. Provider identified as Xu Saeed as well as credentials APRN MSN FNP-C PUMA-JULISSA.   The client/patient gave consent to be seen remotely, and when consent is given they understand that the consent allows for patient identifiable information to be sent to a third party as needed.   They may refuse to be seen remotely at any time. The electronic data is encrypted and password protected, and the patient has been advised of the potential risks to privacy not withstanding such measures.    Initial Evaluation: Chief Complaint/History of Present Illness: Patient presents for initial evaluation desiring to transition buprenorphine therapy from another clinic to the [facility].  Patient reports stability and positive therapeutic benefit from buprenorphine over the past 2 years-this is substantiated by Carl review.  -Patient reports he has done well over the past 2 years recovering from polysubstance use most notably opiates and methamphetamine  -Patient reports transferred to the [facility] will allow for her to attend medication assisted treatment evaluations with increased ease as it relates to her busy life schedule  -As patient can substantiate tolerance to buprenorphine via today's presumptive urinalysis and also Carl review will continue at previously prescribed maintenance dosage  -We will accommodate monthly prescriptions immediately as patient has been able to demonstrate long-term stability  -Patient reports she struggled with anxiety for the majority of her life and has never been treated for such, add Prozac to be taken for anxiety and intermittent depression and also add  clonidine to be taken as needed for heightened periods of anxiety  -Denies depressive exacerbations, no suicidal or homicidal thoughts    Urine Drug Screen (today's visit) discussed: Positive buprenorphine, otherwise negative for substances tested    DONG (PDMP) Reviewed for Current/Active Medications: Buprenorphine naloxone with last dispensed date of 2023 for a 7-day supply    10/24/2022-hepatic function within normal limits, Cr WNL    Past Surgical History:  Past Surgical History:   Procedure Laterality Date    APPENDECTOMY       SECTION      ENDOSCOPY N/A 10/28/2022    Procedure: ESOPHAGOGASTRODUODENOSCOPY;  Surgeon: Jayro Bar MD;  Location: [facility];  Service: Gastroenterology;  Laterality: N/A;       Problem List:  Patient Active Problem List   Diagnosis    Esophageal dysphagia    Iron deficiency anemia, unspecified       Allergy:   Allergies   Allergen Reactions    Diphenhydramine     Sulfamethoxazole-Trimethoprim         Current Medications:   Current Outpatient Medications   Medication Sig Dispense Refill    buprenorphine-naloxone (SUBOXONE) 8-2 MG per SL tablet Place 2 tablets under the tongue Daily. 56 tablet 0    Ciprodex 0.3-0.1 % otic suspension SHAKE LIQUID AND INSTILL 4 DROPS TO LEFT EAR TWICE DAILY AS DIRECTED      ibuprofen (ADVIL,MOTRIN) 200 MG tablet Take 4 tablets by mouth Every 12 (Twelve) Hours.      Neurontin 600 MG tablet Take 1 tablet by mouth 3 (Three) Times a Day.      Catrachito Carb-Mag Hydrox-Simeth (Mylanta Coat & Cool) 1200-270-80 MG/10ML suspension Take 5 mL by mouth 2 (Two) Times a Day As Needed (indigestion). 355 mL 0    cloNIDine (Catapres) 0.1 MG tablet Take 1 tablet by mouth 2 (Two) Times a Day As Needed (anxiety, insomnia, chills, sweats). 60 tablet 0    FLUoxetine (PROzac) 20 MG capsule Take 1 capsule by mouth Daily. 30 capsule 0    naloxone (NARCAN) 4 MG/0.1ML nasal spray 1 spray into the nostril(s) as directed by provider As Needed (opiate over sedation). 1  spray in 1 nostril every 2 to 3 minutes call 911 2 each 2    pantoprazole (PROTONIX) 40 MG EC tablet Take 1 tablet by mouth Daily. 30 tablet 0     No current facility-administered medications for this visit.       Past Medical History:  Past Medical History:   Diagnosis Date    Anxiety     Substance abuse          Social History     Socioeconomic History    Marital status: Single   Tobacco Use    Smoking status: Every Day     Packs/day: 0.50     Types: Cigarettes    Smokeless tobacco: Never   Vaping Use    Vaping Use: Never used   Substance and Sexual Activity    Alcohol use: Not Currently    Drug use: Not Currently     Types: Methamphetamines     Comment: per patient has been clean for about 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    Sexual activity: Defer       Family History:   Family History of Substance/Alcohol use:      Family History   Problem Relation Age of Onset    Heart disease Mother     Heart disease Father     Cancer Maternal Grandmother     Cancer Maternal Grandfather          Mental Status Exam:   Hygiene:   good  Cooperation:  Cooperative  Eye Contact:  Good  Psychomotor Behavior:  Appropriate  Affect:  Appropriate  Mood: anxious  Hopelessness: Optimistic  Speech:  Normal  Thought Process:  Goal directed  Thought Content:  Normal  Suicidal:  None  Homicidal:  None  Hallucinations:  None  Delusion:  None  Memory:  Intact  Orientation:  Grossly intact  Reliability:  good  Insight:  Good  Judgement:  Good  Impulse Control:  Good  Physical/Medical Issues:  No        Diagnostic Criteria for Substance Use Disorder (MARLEEN)    Impaired Control over Substance Use  -Use of substance in increasing amounts and/or increasing periods of time  -Desire to cut down or quite but unable  -Significant amount of time procuring/using/recovering from use of substance  -Cravings, particularly around triggers    Social Impairment  -Obligations at home/school/work failed/neglected  -Social/occupational/recreational activities  abandoned  -Continues use despite adverse interpersonal/social consequences    Risky Use of Substance  -Use of substance in situations that are dangerous (ex IV use, driving intoxicated)  -Continued use despite knowledge of a psychological and/or physical problem which will develop or worsen with use    Pharmacological Criteria  -Tolerance  -Withdrawal (dependence)     Level of MARLEEN  Mild MARLEEN: 2-3 Criteria  Moderate MARLEEN: 4-5 Criteria  Severe MARLEEN: 6 or more Criteria            Review of Systems:  Review of Systems   Constitutional:  Negative for activity change, chills, diaphoresis and fatigue.   Respiratory:  Negative for apnea, cough and shortness of breath.    Cardiovascular:  Negative for chest pain, palpitations and leg swelling.   Gastrointestinal:  Negative for abdominal pain, constipation, diarrhea, nausea and vomiting.   Genitourinary:  Negative for difficulty urinating.   Musculoskeletal:  Negative for arthralgias.   Skin:  Negative for rash.   Neurological:  Negative for dizziness, weakness and headaches.   Psychiatric/Behavioral:  Negative for agitation, self-injury, sleep disturbance and suicidal ideas. The patient is nervous/anxious.        Physical Exam:  Physical Exam  Vitals reviewed.   Constitutional:       General: She is not in acute distress.     Appearance: Normal appearance. She is not ill-appearing or toxic-appearing.   Pulmonary:      Effort: Pulmonary effort is normal.   Musculoskeletal:         General: Normal range of motion.   Neurological:      General: No focal deficit present.      Mental Status: She is alert and oriented to person, place, and time.   Psychiatric:         Attention and Perception: Attention and perception normal.         Mood and Affect: Mood is anxious. Mood is not depressed.         Speech: Speech normal.         Behavior: Behavior normal. Behavior is cooperative.         Thought Content: Thought content normal.         Cognition and Memory: Cognition and memory  normal.         Judgment: Judgment normal.     Vital Signs:   There were no vitals taken for this visit.     Lab Results:   Office Visit on 06/15/2023   Component Date Value Ref Range Status    External Amphetamine Screen Urine 06/15/2023 Negative   Final    External Benzodiazepine Screen Uri* 06/15/2023 Negative   Final    External Cocaine Screen Urine 06/15/2023 Negative   Final    External THC Screen Urine 06/15/2023 Negative   Final    External Methadone Screen Urine 06/15/2023 Negative   Final    External Methamphetamine Screen Ur* 06/15/2023 Negative   Final    External Oxycodone Screen Urine 06/15/2023 Negative   Final    External Buprenorphine Screen Urine 06/15/2023 Positive (A)   Final    External MDMA 06/15/2023 Negative   Final    External Opiates Screen Urine 06/15/2023 Negative   Final   Lab on 05/24/2023   Component Date Value Ref Range Status    Ferritin 05/24/2023 3.40 (L)  13.00 - 150.00 ng/mL Final    Iron 05/24/2023 14 (L)  37 - 145 mcg/dL Final    Iron Saturation (TSAT) 05/24/2023 2 (L)  20 - 50 % Final    Transferrin 05/24/2023 383 (H)  200 - 360 mg/dL Final    TIBC 05/24/2023 571 (H)  298 - 536 mcg/dL Final           Assessment & Plan   Diagnoses and all orders for this visit:    1. Opioid type dependence, continuous (Primary)  -     buprenorphine-naloxone (SUBOXONE) 8-2 MG per SL tablet; Place 2 tablets under the tongue Daily.  Dispense: 56 tablet; Refill: 0  -     naloxone (NARCAN) 4 MG/0.1ML nasal spray; 1 spray into the nostril(s) as directed by provider As Needed (opiate over sedation). 1 spray in 1 nostril every 2 to 3 minutes call 911  Dispense: 2 each; Refill: 2    2. Medication management  -     [facility] Drug Screen    3. Generalized anxiety disorder  -     FLUoxetine (PROzac) 20 MG capsule; Take 1 capsule by mouth Daily.  Dispense: 30 capsule; Refill: 0  -     cloNIDine (Catapres) 0.1 MG tablet; Take 1 tablet by mouth 2 (Two) Times a Day As Needed (anxiety, insomnia, chills, sweats).   Dispense: 60 tablet; Refill: 0        Visit Diagnoses:    ICD-10-CM ICD-9-CM   1. Opioid type dependence, continuous  F11.20 304.01   2. Medication management  Z79.899 V58.69   3. Generalized anxiety disorder  F41.1 300.02       PLAN:  Review Expectations for care in Medicated Assisted Treatment     Treatment Plan:   Medication management is only one component of treatment. To maximize the potential for treatment success it is necessary to take part in 1:1 counselling and or 12 Step Facilitation in which you maintain an active relationship with a sponsor or . Verification/Proof of active involvement with 1:1 counselling or 12 Step Facilitation may be required as a component of the treatment plan.    Clinic expectations:     Visit Frequency: New client Progression- Weekly x 2 months (8 weeks/visits), Biweekly x 2 months (4 visits), monthly thereafter.  Visit frequency is dependent upon drug screen analysis and treatment plan compliance; Required interval between clinic visits may be shortened or increased.    Drug Screen: You will be required to provide a drug screen at each clinic evaluation; if unable/unwilling you may be asked to reschedule and will not see the provider. Supervised urine collections are required.     Appointments: You will be required to set an appointment for your clinic evaluations. These evaluation appointments must be maintained as scheduled; If you are more than 15 minutes late for a scheduled appointment you may be asked to reschedule and will not see a provider that day.     Random Evaluations: Random evaluations are essential for accountability in Medicated Assisted Treatment as well as a [place] law requirement. Thus, a working phone number must be maintained. If called for a Random evaluation, you have 24 hours to report to the clinic (during normal operating business hours) with your medication and pill bottle for assessment as well as a drug screen.  Clinic staff must  have means to contact you.     Medication Management:    Medication will be prescribed to last until the next clinic follow up evaluation; no controlled substance refills (ex. Suboxone/buprenorphine) will be prescribed without completing a clinic evaluation. If you need to reschedule an appointment every effort will be made to reschedule as soon as possible for evaluation and further medication management. Contact the clinic if an extenuating circumstance presents.      Lost/Stolen Medication: Controlled substances (ex. Suboxone/buprenorphine) will not automatically be refilled in the event of lost/stolen medication. If a prescribed controlled substance is stolen, you must notify law enforcement and proof of said notification may be required. Notify the clinic for further guidance.     Involuntary Discharge:  You will be involuntary discharged if you exhibit violent/threatening/abusive behavior or if compelling evidence shows diversion of medication.       TREATMENT PLAN/GOALS: Continue supportive psychotherapy efforts and medications as indicated. Treatment and medication options discussed during today's visit. Patient acknowledged and verbally consented to continue with current treatment plan and was educated on the importance of compliance with treatment and follow-up appointments.    MEDICATION ISSUES:  DONG reviewed  Discussed medication options and treatment plan of prescribed medication as well as the risks, benefits, and side effects including potential falls, possible impaired driving and metabolic adversities among others. Patient is agreeable to call the office with any worsening of symptoms or onset of side effects. Patient is agreeable to call 911 or go to the nearest ER should he/she begin having SI/HI. No medication side effects or related complaints today.     MEDS ORDERED DURING VISIT:  New Medications Ordered This Visit   Medications    buprenorphine-naloxone (SUBOXONE) 8-2 MG per SL tablet      Sig: Place 2 tablets under the tongue Daily.     Dispense:  56 tablet     Refill:  0     NADEAN:[id number]    FLUoxetine (PROzac) 20 MG capsule     Sig: Take 1 capsule by mouth Daily.     Dispense:  30 capsule     Refill:  0    cloNIDine (Catapres) 0.1 MG tablet     Sig: Take 1 tablet by mouth 2 (Two) Times a Day As Needed (anxiety, insomnia, chills, sweats).     Dispense:  60 tablet     Refill:  0    naloxone (NARCAN) 4 MG/0.1ML nasal spray     Si spray into the nostril(s) as directed by provider As Needed (opiate over sedation). 1 spray in 1 nostril every 2 to 3 minutes call 911     Dispense:  2 each     Refill:  2       No follow-ups on file.           This document has been electronically signed by ALMA Garcia  Nohemi 15, 2023 10:25 EDT      Part of this note may be an electronic transcription/translation of spoken language to printed text using the Dragon Dictation System.

## 2023-08-08 DIAGNOSIS — F41.1 GENERALIZED ANXIETY DISORDER: ICD-10-CM

## 2023-08-08 RX ORDER — FLUOXETINE HYDROCHLORIDE 20 MG/1
20 CAPSULE ORAL DAILY
Qty: 30 CAPSULE | Refills: 0 | Status: SHIPPED | OUTPATIENT
Start: 2023-08-08 | End: 2023-08-10 | Stop reason: SDUPTHER

## 2023-08-10 ENCOUNTER — TELEMEDICINE (OUTPATIENT)
Dept: PSYCHIATRY | Facility: CLINIC | Age: 33
End: 2023-08-10
Payer: COMMERCIAL

## 2023-08-10 VITALS
HEIGHT: 65 IN | DIASTOLIC BLOOD PRESSURE: 60 MMHG | SYSTOLIC BLOOD PRESSURE: 136 MMHG | WEIGHT: 155.6 LBS | HEART RATE: 102 BPM | BODY MASS INDEX: 25.92 KG/M2

## 2023-08-10 DIAGNOSIS — Z79.899 MEDICATION MANAGEMENT: ICD-10-CM

## 2023-08-10 DIAGNOSIS — R41.840 IMPAIRED CONCENTRATION: ICD-10-CM

## 2023-08-10 DIAGNOSIS — F41.1 GENERALIZED ANXIETY DISORDER: ICD-10-CM

## 2023-08-10 DIAGNOSIS — F11.20 OPIOID TYPE DEPENDENCE, CONTINUOUS: Primary | ICD-10-CM

## 2023-08-10 RX ORDER — ATOMOXETINE 40 MG/1
40 CAPSULE ORAL DAILY
Qty: 30 CAPSULE | Refills: 0 | Status: SHIPPED | OUTPATIENT
Start: 2023-08-10 | End: 2024-08-09

## 2023-08-10 RX ORDER — FLUOXETINE HYDROCHLORIDE 20 MG/1
20 CAPSULE ORAL DAILY
Qty: 30 CAPSULE | Refills: 0 | Status: SHIPPED | OUTPATIENT
Start: 2023-08-10 | End: 2024-08-09

## 2023-08-10 RX ORDER — BUPRENORPHINE HYDROCHLORIDE AND NALOXONE HYDROCHLORIDE DIHYDRATE 8; 2 MG/1; MG/1
2 TABLET SUBLINGUAL DAILY
Qty: 56 TABLET | Refills: 0 | Status: SHIPPED | OUTPATIENT
Start: 2023-08-10

## 2023-08-10 NOTE — PROGRESS NOTES
This provider is located at [facility]. The Patient is seen remotely located at the [facility] ([facility]) using Video. Patient is being seen via telehealth and confirm that they are in a secure environment for this session. The patient's condition being diagnosed/treated is appropriate for telemedicine. Provider identified as Xu Saeed as well as credentials APRN MSN FNP-C [id number].   The client/patient gave consent to be seen remotely, and when consent is given they understand that the consent allows for patient identifiable information to be sent to a third party as needed.   They may refuse to be seen remotely at any time. The electronic data is encrypted and password protected, and the patient has been advised of the potential risks to privacy not withstanding such measures.    Chief Complaint/History of Present Illness: Follow Up buprenorphine/naloxone Medicated Assisted Treatment for Opiate Use Disorder     Patient/Client Concerns/Updates: Continue Prozac and clonidine for anxiety, Strattera for difficulty concentrating  -Patient reports overall she is doing well and having a good month, no significant life changes since last evaluation  -Initiation of Prozac last evaluation has been therapeutically beneficial, patient denies concerns or side effects with such, reports an overall anxiolytic benefit  -Patient reports difficulty concentrating, add Strattera 40 mg every morning for such  -Patient denies depressive concerns or episodes, no suicidal or homicidal thoughts and no symptoms of psychosis    Triggers (Persons/Places/Things/Events/Thought/Emotions): Difficulty concentrating and anxiety    Cravings: Denies cravings or use of illicit substances    Relapse Prevention: Counseling    Urine Drug Screen (today's visit) discussed: Positive buprenorphine, otherwise negative for substances tested    UDS Confirmation (Most recent/Resulted): Positive buprenorphine/nor-buprenorphine, no  concerns for urine tampering otherwise positive gabapentin    Most recent pertinent laboratory studies reviewed: 10/24/2022-hepatic function within normal limits, Cr WNL      DONG (PDMP) Reviewed for Current/Active Medications: buprenorphine/naloxone and gabapentin as reviewed today    Past Surgical History:  Past Surgical History:   Procedure Laterality Date    APPENDECTOMY       SECTION      ENDOSCOPY N/A 10/28/2022    Procedure: ESOPHAGOGASTRODUODENOSCOPY;  Surgeon: Jayro Bar MD;  Location: [facility];  Service: Gastroenterology;  Laterality: N/A;       Problem List:  Patient Active Problem List   Diagnosis    Esophageal dysphagia    Iron deficiency anemia, unspecified       Allergy:   Allergies   Allergen Reactions    Diphenhydramine     Sulfamethoxazole-Trimethoprim         Current Medications:   Current Outpatient Medications   Medication Sig Dispense Refill    buprenorphine-naloxone (SUBOXONE) 8-2 MG per SL tablet Place 2 tablets under the tongue Daily. 56 tablet 0    cefdinir (OMNICEF) 300 MG capsule TAKE 1 CAPSULE BY MOUTH TWICE DAILY FOR INFECTION      Ciprodex 0.3-0.1 % otic suspension SHAKE LIQUID AND INSTILL 4 DROPS TO LEFT EAR TWICE DAILY AS DIRECTED      cloNIDine (CATAPRES) 0.1 MG tablet TAKE 1 TABLET BY MOUTH 2 TIMES DAILY AS NEEDED (Patient taking differently: 1 tablet As Needed.) 60 tablet 0    FLUoxetine (PROzac) 20 MG capsule TAKE 1 CAPSULE BY MOUTH DAILY 30 capsule 0    ibuprofen (ADVIL,MOTRIN) 200 MG tablet Take 4 tablets by mouth Every 12 (Twelve) Hours.      naloxone (NARCAN) 4 MG/0.1ML nasal spray 1 spray into the nostril(s) as directed by provider As Needed (opiate over sedation). 1 spray in 1 nostril every 2 to 3 minutes call 911 (Patient not taking: Reported on 2023) 2 each 2    Neurontin 600 MG tablet Take 1 tablet by mouth 3 (Three) Times a Day.      tamsulosin (FLOMAX) 0.4 MG capsule 24 hr capsule        No current facility-administered medications  for this visit.       Past Medical History:  Past Medical History:   Diagnosis Date    Anxiety     Substance abuse          Social History     Socioeconomic History    Marital status: Single   Tobacco Use    Smoking status: Every Day     Packs/day: 0.50     Types: Cigarettes    Smokeless tobacco: Never   Vaping Use    Vaping Use: Never used   Substance and Sexual Activity    Alcohol use: Not Currently    Drug use: Not Currently     Types: Methamphetamines     Comment: per patient has been clean for about 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    Sexual activity: Defer         Family History   Problem Relation Age of Onset    Heart disease Mother     Heart disease Father     Cancer Maternal Grandmother     Cancer Maternal Grandfather          Mental Status Exam:   Hygiene:   good  Cooperation:  Cooperative  Eye Contact:  Good  Psychomotor Behavior:  Appropriate  Affect:  Appropriate  Mood: normal  Speech:  Normal  Thought Process:  Goal directed  Thought Content:  Normal  Suicidal:  None  Homicidal:  None  Hallucinations:  None  Delusion:  None  Memory:  Intact  Orientation:  Grossly intact  Reliability:  good  Insight:  Good  Judgement:  Good  Impulse Control:  Good         Review of Systems:  Review of Systems   Constitutional:  Negative for activity change, chills, diaphoresis and fatigue.   Respiratory:  Negative for apnea, cough and shortness of breath.    Cardiovascular:  Negative for chest pain, palpitations and leg swelling.   Gastrointestinal:  Negative for abdominal pain, constipation, diarrhea, nausea and vomiting.   Genitourinary:  Negative for difficulty urinating.   Musculoskeletal:  Negative for arthralgias.   Skin:  Negative for rash.   Neurological:  Negative for dizziness, weakness and headaches.   Psychiatric/Behavioral:  Positive for decreased concentration. Negative for agitation, self-injury, sleep disturbance and suicidal ideas. The patient is nervous/anxious.        Physical Exam:  Physical  Exam  Vitals reviewed.   Constitutional:       General: She is not in acute distress.     Appearance: Normal appearance. She is not ill-appearing or toxic-appearing.   Pulmonary:      Effort: Pulmonary effort is normal.   Musculoskeletal:         General: Normal range of motion.   Neurological:      General: No focal deficit present.      Mental Status: She is alert and oriented to person, place, and time.   Psychiatric:         Attention and Perception: Attention and perception normal.         Mood and Affect: Mood normal. Mood is not anxious or depressed.         Speech: Speech normal.         Behavior: Behavior normal. Behavior is cooperative.         Thought Content: Thought content normal.         Cognition and Memory: Cognition and memory normal.         Judgment: Judgment normal.     Vital Signs:   There were no vitals taken for this visit.     Lab Results:   Telemedicine on 07/13/2023   Component Date Value Ref Range Status    External Amphetamine Screen Urine 07/13/2023 Negative   Final    External Benzodiazepine Screen Uri* 07/13/2023 Negative   Final    External Cocaine Screen Urine 07/13/2023 Negative   Final    External THC Screen Urine 07/13/2023 Negative   Final    External Methadone Screen Urine 07/13/2023 Negative   Final    External Methamphetamine Screen Ur* 07/13/2023 Negative   Final    External Oxycodone Screen Urine 07/13/2023 Negative   Final    External Buprenorphine Screen Urine 07/13/2023 Negative   Final    External MDMA 07/13/2023 Negative   Final    External Opiates Screen Urine 07/13/2023 Negative   Final   Office Visit on 06/15/2023   Component Date Value Ref Range Status    External Amphetamine Screen Urine 06/15/2023 Negative   Final    External Benzodiazepine Screen Uri* 06/15/2023 Negative   Final    External Cocaine Screen Urine 06/15/2023 Negative   Final    External THC Screen Urine 06/15/2023 Negative   Final    External Methadone Screen Urine 06/15/2023  Negative   Final    External Methamphetamine Screen Ur* 06/15/2023 Negative   Final    External Oxycodone Screen Urine 06/15/2023 Negative   Final    External Buprenorphine Screen Urine 06/15/2023 Positive (A)   Final    External MDMA 06/15/2023 Negative   Final    External Opiates Screen Urine 06/15/2023 Negative   Final   Lab on 05/24/2023   Component Date Value Ref Range Status    Ferritin 05/24/2023 3.40 (L)  13.00 - 150.00 ng/mL Final    Iron 05/24/2023 14 (L)  37 - 145 mcg/dL Final    Iron Saturation (TSAT) 05/24/2023 2 (L)  20 - 50 % Final    Transferrin 05/24/2023 383 (H)  200 - 360 mg/dL Final    TIBC 05/24/2023 571 (H)  298 - 536 mcg/dL Final         Assessment & Plan   Diagnoses and all orders for this visit:    1. Opioid type dependence, continuous (Primary)  -     buprenorphine-naloxone (SUBOXONE) 8-2 MG per SL tablet; Place 2 tablets under the tongue Daily.  Dispense: 56 tablet; Refill: 0    2. Medication management  -     [facility] Drug Screen    3. Generalized anxiety disorder  -     FLUoxetine (PROzac) 20 MG capsule; Take 1 capsule by mouth Daily.  Dispense: 30 capsule; Refill: 0    4. Impaired concentration  -     atomoxetine (Strattera) 40 MG capsule; Take 1 capsule by mouth Daily.  Dispense: 30 capsule; Refill: 0        Visit Diagnoses:  No diagnosis found.    PLAN:  Safety: No acute safety concerns  Risk Assessment: Risk of self-harm acutely is low. Risk of self-harm chronically is also low, but could be further elevated in the event of treatment noncompliance and/or AODA.    TREATMENT PLAN/GOALS: Continue supportive psychotherapy efforts and medications as indicated. Treatment and medication options discussed during today's visit. Patient acknowledged and verbally consented to continue with current treatment plan and was educated on the importance of compliance with treatment and follow-up appointments.    MEDICATION ISSUES:  DONG reviewed as expected.  Discussed medication options  and treatment plan of prescribed medication as well as the risks, benefits, and side effects including potential falls, possible impaired driving and metabolic adversities among others. Patient is agreeable to call the office with any worsening of symptoms or onset of side effects. Patient is agreeable to call 911 or go to the nearest ER should he/she begin having SI/HI. No medication side effects or related complaints today.     MEDS ORDERED DURING VISIT:  No orders of the defined types were placed in this encounter.      No follow-ups on file.           This document has been electronically signed by ALMA Garcia  August 10, 2023 16:06 EDT      Part of this note may be an electronic transcription/translation of spoken language to printed text using the Dragon Dictation System.

## 2023-08-11 ENCOUNTER — PRIOR AUTHORIZATION (OUTPATIENT)
Dept: PSYCHIATRY | Facility: CLINIC | Age: 33
End: 2023-08-11
Payer: COMMERCIAL

## 2023-08-16 NOTE — TELEPHONE ENCOUNTER
Drug  Atomoxetine HCl 40MG capsules  Form  [employer] [place] [employer] ePA Form 2017 NCPDP    Key: [id number] - PA Case ID: [id number]  
Prior Authorization for Strattera has been denied for the following reasons:        This request has not been approved. Based on the information submitted for review, you did not meet our guideline rules for the requested drug. In order for your request to be approved, your provider would need to show that you have met the guideline rules below. The details below are written in medical language. If you have questions, please contact your provider. In some cases, the requested medication or alternatives offered may have additional approval requirements. Our guideline named STIMULANTS requires the following rule(s) be met for approval: A. The member has ONE of the following diagnoses: 1. Attention Deficit Disorder (ADD)/Attention-deficit/hyperactivity disorder (ADHD) [types of mental health disorder] 2. Narcolepsy [a type of sleep condition] 3. Sleep apnea [a type of sleep condition with difficulty breathing] 4. Circadian rhythm (shift work) sleep disorder [a type of sleep condition caused by irregular work shifts] Your doctor told us you have a diagnosis of attention and concentration deficit [a type of mental health condition]. We do not have information showing that you have ONE of the diagnoses listed above. This is why your request is denied. Please work with your doctor to use a different medication or get us more information if it will allow us to approve this request. A written notification letter will follow with additional detail  
Render In Strict Bullet Format?: No
Continue Regimen: Clindamycin and benzoyl peroxide and dapsone
Continue Regimen: Clindamycin prn
Detail Level: Zone

## 2023-09-07 ENCOUNTER — CONSULT (OUTPATIENT)
Dept: ONCOLOGY | Facility: CLINIC | Age: 33
End: 2023-09-07
Payer: COMMERCIAL

## 2023-09-07 ENCOUNTER — LAB (OUTPATIENT)
Dept: ONCOLOGY | Facility: CLINIC | Age: 33
End: 2023-09-07
Payer: COMMERCIAL

## 2023-09-07 ENCOUNTER — TELEMEDICINE (OUTPATIENT)
Dept: PSYCHIATRY | Facility: CLINIC | Age: 33
End: 2023-09-07
Payer: COMMERCIAL

## 2023-09-07 VITALS
RESPIRATION RATE: 18 BRPM | BODY MASS INDEX: 25.41 KG/M2 | OXYGEN SATURATION: 96 % | TEMPERATURE: 97.1 F | SYSTOLIC BLOOD PRESSURE: 142 MMHG | HEIGHT: 65 IN | HEART RATE: 102 BPM | WEIGHT: 152.5 LBS | DIASTOLIC BLOOD PRESSURE: 70 MMHG

## 2023-09-07 VITALS
WEIGHT: 152 LBS | SYSTOLIC BLOOD PRESSURE: 112 MMHG | DIASTOLIC BLOOD PRESSURE: 61 MMHG | HEIGHT: 65 IN | HEART RATE: 85 BPM | BODY MASS INDEX: 25.33 KG/M2

## 2023-09-07 DIAGNOSIS — R53.83 OTHER FATIGUE: ICD-10-CM

## 2023-09-07 DIAGNOSIS — R41.840 IMPAIRED CONCENTRATION: ICD-10-CM

## 2023-09-07 DIAGNOSIS — Z79.899 MEDICATION MANAGEMENT: ICD-10-CM

## 2023-09-07 DIAGNOSIS — F90.0 ATTENTION DEFICIT HYPERACTIVITY DISORDER (ADHD), PREDOMINANTLY INATTENTIVE TYPE: ICD-10-CM

## 2023-09-07 DIAGNOSIS — K90.9 MALABSORPTION OF IRON: ICD-10-CM

## 2023-09-07 DIAGNOSIS — F11.20 OPIOID TYPE DEPENDENCE, CONTINUOUS: Primary | ICD-10-CM

## 2023-09-07 DIAGNOSIS — F41.1 GENERALIZED ANXIETY DISORDER: ICD-10-CM

## 2023-09-07 DIAGNOSIS — D50.9 IRON DEFICIENCY ANEMIA, UNSPECIFIED IRON DEFICIENCY ANEMIA TYPE: Primary | ICD-10-CM

## 2023-09-07 DIAGNOSIS — D50.9 IRON DEFICIENCY ANEMIA, UNSPECIFIED IRON DEFICIENCY ANEMIA TYPE: ICD-10-CM

## 2023-09-07 DIAGNOSIS — R53.1 WEAKNESS: ICD-10-CM

## 2023-09-07 LAB
ALBUMIN SERPL-MCNC: 4.6 G/DL (ref 3.5–5.2)
ALBUMIN/GLOB SERPL: 1.6 G/DL
ALP SERPL-CCNC: 78 U/L (ref 39–117)
ALT SERPL W P-5'-P-CCNC: 13 U/L (ref 1–33)
ANION GAP SERPL CALCULATED.3IONS-SCNC: 9.1 MMOL/L (ref 5–15)
ANISOCYTOSIS BLD QL: NORMAL
AST SERPL-CCNC: 18 U/L (ref 1–32)
BASOPHILS # BLD AUTO: 0.13 10*3/MM3 (ref 0–0.2)
BASOPHILS NFR BLD AUTO: 1.7 % (ref 0–1.5)
BILIRUB SERPL-MCNC: <0.2 MG/DL (ref 0–1.2)
BUN SERPL-MCNC: 9 MG/DL (ref 6–20)
BUN/CREAT SERPL: 9.9 (ref 7–25)
CALCIUM SPEC-SCNC: 9.3 MG/DL (ref 8.6–10.5)
CHLORIDE SERPL-SCNC: 106 MMOL/L (ref 98–107)
CO2 SERPL-SCNC: 21.9 MMOL/L (ref 22–29)
CREAT SERPL-MCNC: 0.91 MG/DL (ref 0.57–1)
CRP SERPL-MCNC: <0.3 MG/DL (ref 0–0.5)
DEPRECATED RDW RBC AUTO: 44.7 FL (ref 37–54)
EGFRCR SERPLBLD CKD-EPI 2021: 86.7 ML/MIN/1.73
EOSINOPHIL # BLD AUTO: 0.47 10*3/MM3 (ref 0–0.4)
EOSINOPHIL NFR BLD AUTO: 6.2 % (ref 0.3–6.2)
ERYTHROCYTE [DISTWIDTH] IN BLOOD BY AUTOMATED COUNT: 18.5 % (ref 12.3–15.4)
ERYTHROCYTE [SEDIMENTATION RATE] IN BLOOD: 5 MM/HR (ref 0–20)
EXTERNAL AMPHETAMINE SCREEN URINE: NEGATIVE
EXTERNAL BENZODIAZEPINE SCREEN URINE: NEGATIVE
EXTERNAL BUPRENORPHINE SCREEN URINE: POSITIVE
EXTERNAL COCAINE SCREEN URINE: NEGATIVE
EXTERNAL MDMA: NEGATIVE
EXTERNAL METHADONE SCREEN URINE: NEGATIVE
EXTERNAL METHAMPHETAMINE SCREEN URINE: NEGATIVE
EXTERNAL OPIATES SCREEN URINE: NEGATIVE
EXTERNAL OXYCODONE SCREEN URINE: NEGATIVE
EXTERNAL THC SCREEN URINE: NEGATIVE
FERRITIN SERPL-MCNC: 6.38 NG/ML (ref 13–150)
GLOBULIN UR ELPH-MCNC: 2.8 GM/DL
GLUCOSE SERPL-MCNC: 78 MG/DL (ref 65–99)
HCT VFR BLD AUTO: 31.5 % (ref 34–46.6)
HGB BLD-MCNC: 8.3 G/DL (ref 12–15.9)
HYPOCHROMIA BLD QL: NORMAL
IMM GRANULOCYTES # BLD AUTO: 0.04 10*3/MM3 (ref 0–0.05)
IMM GRANULOCYTES NFR BLD AUTO: 0.5 % (ref 0–0.5)
IRON 24H UR-MRATE: 12 MCG/DL (ref 37–145)
IRON SATN MFR SERPL: 2 % (ref 20–50)
LDH SERPL-CCNC: 209 U/L (ref 135–214)
LYMPHOCYTES # BLD AUTO: 1.19 10*3/MM3 (ref 0.7–3.1)
LYMPHOCYTES NFR BLD AUTO: 15.7 % (ref 19.6–45.3)
MCH RBC QN AUTO: 18.1 PG (ref 26.6–33)
MCHC RBC AUTO-ENTMCNC: 26.3 G/DL (ref 31.5–35.7)
MCV RBC AUTO: 68.8 FL (ref 79–97)
MICROCYTES BLD QL: NORMAL
MONOCYTES # BLD AUTO: 0.67 10*3/MM3 (ref 0.1–0.9)
MONOCYTES NFR BLD AUTO: 8.9 % (ref 5–12)
NEUTROPHILS NFR BLD AUTO: 5.07 10*3/MM3 (ref 1.7–7)
NEUTROPHILS NFR BLD AUTO: 67 % (ref 42.7–76)
NRBC BLD AUTO-RTO: 0 /100 WBC (ref 0–0.2)
PLAT MORPH BLD: NORMAL
PLATELET # BLD AUTO: 252 10*3/MM3 (ref 140–450)
PMV BLD AUTO: ABNORMAL FL
POTASSIUM SERPL-SCNC: 4.3 MMOL/L (ref 3.5–5.2)
PROT SERPL-MCNC: 7.4 G/DL (ref 6–8.5)
RBC # BLD AUTO: 4.58 10*6/MM3 (ref 3.77–5.28)
RETICS # AUTO: 0.06 10*6/MM3 (ref 0.02–0.13)
RETICS/RBC NFR AUTO: 1.37 % (ref 0.7–1.9)
SODIUM SERPL-SCNC: 137 MMOL/L (ref 136–145)
TIBC SERPL-MCNC: 501 MCG/DL (ref 298–536)
TRANSFERRIN SERPL-MCNC: 336 MG/DL (ref 200–360)
TSH SERPL DL<=0.05 MIU/L-ACNC: 1.6 UIU/ML (ref 0.27–4.2)
WBC NRBC COR # BLD: 7.57 10*3/MM3 (ref 3.4–10.8)

## 2023-09-07 PROCEDURE — 86364 TISS TRNSGLTMNASE EA IG CLAS: CPT

## 2023-09-07 PROCEDURE — 80053 COMPREHEN METABOLIC PANEL: CPT

## 2023-09-07 PROCEDURE — 84466 ASSAY OF TRANSFERRIN: CPT

## 2023-09-07 PROCEDURE — 85007 BL SMEAR W/DIFF WBC COUNT: CPT

## 2023-09-07 PROCEDURE — 82728 ASSAY OF FERRITIN: CPT

## 2023-09-07 PROCEDURE — 85652 RBC SED RATE AUTOMATED: CPT

## 2023-09-07 PROCEDURE — 82746 ASSAY OF FOLIC ACID SERUM: CPT

## 2023-09-07 PROCEDURE — 82525 ASSAY OF COPPER: CPT

## 2023-09-07 PROCEDURE — 84630 ASSAY OF ZINC: CPT

## 2023-09-07 PROCEDURE — 85045 AUTOMATED RETICULOCYTE COUNT: CPT

## 2023-09-07 PROCEDURE — 82607 VITAMIN B-12: CPT

## 2023-09-07 PROCEDURE — 84443 ASSAY THYROID STIM HORMONE: CPT

## 2023-09-07 PROCEDURE — 86140 C-REACTIVE PROTEIN: CPT

## 2023-09-07 PROCEDURE — 83615 LACTATE (LD) (LDH) ENZYME: CPT

## 2023-09-07 PROCEDURE — 83010 ASSAY OF HAPTOGLOBIN QUANT: CPT

## 2023-09-07 PROCEDURE — 83540 ASSAY OF IRON: CPT

## 2023-09-07 PROCEDURE — 85025 COMPLETE CBC W/AUTO DIFF WBC: CPT

## 2023-09-07 RX ORDER — VALACYCLOVIR HYDROCHLORIDE 1 G/1
TABLET, FILM COATED ORAL
COMMUNITY
Start: 2023-09-06

## 2023-09-07 RX ORDER — SODIUM CHLORIDE 9 MG/ML
250 INJECTION, SOLUTION INTRAVENOUS ONCE
OUTPATIENT
Start: 2023-09-07

## 2023-09-07 RX ORDER — ATOMOXETINE 40 MG/1
40 CAPSULE ORAL DAILY
Qty: 30 CAPSULE | Refills: 0 | Status: SHIPPED | OUTPATIENT
Start: 2023-09-07 | End: 2024-09-06

## 2023-09-07 RX ORDER — METRONIDAZOLE 500 MG/1
TABLET ORAL
COMMUNITY
Start: 2023-09-06

## 2023-09-07 RX ORDER — SODIUM CHLORIDE 9 MG/ML
250 INJECTION, SOLUTION INTRAVENOUS ONCE
OUTPATIENT
Start: 2023-09-09

## 2023-09-07 RX ORDER — FLUOXETINE HYDROCHLORIDE 20 MG/1
20 CAPSULE ORAL DAILY
Qty: 30 CAPSULE | Refills: 0 | Status: SHIPPED | OUTPATIENT
Start: 2023-09-07 | End: 2024-09-06

## 2023-09-07 RX ORDER — SODIUM CHLORIDE 9 MG/ML
250 INJECTION, SOLUTION INTRAVENOUS ONCE
OUTPATIENT
Start: 2023-09-08

## 2023-09-07 RX ORDER — BUPRENORPHINE HYDROCHLORIDE AND NALOXONE HYDROCHLORIDE DIHYDRATE 8; 2 MG/1; MG/1
2 TABLET SUBLINGUAL DAILY
Qty: 56 TABLET | Refills: 0 | Status: SHIPPED | OUTPATIENT
Start: 2023-09-07

## 2023-09-07 RX ORDER — PANTOPRAZOLE SODIUM 40 MG/1
40 TABLET, DELAYED RELEASE ORAL DAILY
COMMUNITY
Start: 2023-08-14

## 2023-09-07 NOTE — PROGRESS NOTES
This provider is located at [facility]. The Patient is seen remotely located at the [facility] ([facility]) using Video. Patient is being seen via telehealth and confirm that they are in a secure environment for this session. The patient's condition being diagnosed/treated is appropriate for telemedicine. Provider identified as Xu Saeed as well as credentials APRN MSN FNP-C [id number].   The client/patient gave consent to be seen remotely, and when consent is given they understand that the consent allows for patient identifiable information to be sent to a third party as needed.   They may refuse to be seen remotely at any time. The electronic data is encrypted and password protected, and the patient has been advised of the potential risks to privacy not withstanding such measures.    Chief Complaint/History of Present Illness: Follow Up buprenorphine/naloxone Medicated Assisted Treatment for Opiate Use Disorder     Patient/Client Concerns/Updates: Continue Prozac and clonidine for anxiety, Strattera for difficulty concentrating (has yet to be initiated due to insurance restraints which hopefully are resolved)  -Patient reports she is doing well and overall has no acute concerns today  -Reports continued difficulty concentrating and reports she is very forgetful, Strattera added to mitigate said symptoms  -Denies depressive concerns or episodes, no suicidal or homicidal thoughts  -Anxiety continues to fluctuate in intensity however is overall manageable and tolerable    Triggers (Persons/Places/Things/Events/Thought/Emotions): Difficulty concentrating and anxiety    Cravings/Substance Use: Denies cravings or use of illicit substances    Relapse Prevention: Counseling    Urine Drug Screen (today's visit) discussed: Positive buprenorphine, otherwise negative for substances tested    UDS Confirmation (Most recent/Resulted): Positive buprenorphine/nor-buprenorphine, no concerns for urine tampering  otherwise positive gabapentin    Most recent pertinent laboratory studies reviewed: 10/24/2022-hepatic function within normal limits, Cr WNL       DONG (PDMP) Reviewed for Current/Active Medications: buprenorphine/naloxone and gabapentin as reviewed today    Past Surgical History:  Past Surgical History:   Procedure Laterality Date   • APPENDECTOMY     •  SECTION     • ENDOSCOPY N/A 10/28/2022    Procedure: ESOPHAGOGASTRODUODENOSCOPY;  Surgeon: Jayro Bar MD;  Location: [facility];  Service: Gastroenterology;  Laterality: N/A;       Problem List:  Patient Active Problem List   Diagnosis   • Esophageal dysphagia   • Iron deficiency anemia, unspecified   • Malabsorption of iron       Allergy:   Allergies   Allergen Reactions   • Diphenhydramine    • Sulfamethoxazole-Trimethoprim         Current Medications:   Current Outpatient Medications   Medication Sig Dispense Refill   • atomoxetine (Strattera) 40 MG capsule Take 1 capsule by mouth Daily. 30 capsule 0   • buprenorphine-naloxone (SUBOXONE) 8-2 MG per SL tablet Place 2 tablets under the tongue Daily. 56 tablet 0   • cloNIDine (CATAPRES) 0.1 MG tablet TAKE 1 TABLET BY MOUTH 2 TIMES DAILY AS NEEDED 60 tablet 0   • FLUoxetine (PROzac) 20 MG capsule Take 1 capsule by mouth Daily. 30 capsule 0   • ibuprofen (ADVIL,MOTRIN) 200 MG tablet Take 4 tablets by mouth Every 12 (Twelve) Hours.     • metroNIDAZOLE (FLAGYL) 500 MG tablet      • Neurontin 600 MG tablet Take 1 tablet by mouth 3 (Three) Times a Day.     • pantoprazole (PROTONIX) 40 MG EC tablet Take 1 tablet by mouth Daily.     • valACYclovir (VALTREX) 1000 MG tablet      • estradiol cypionate (DEPO-ESTRADIOL) 5 MG/ML injection Inject  into the appropriate muscle as directed by prescriber Every 28 (Twenty-Eight) Days. (Patient not taking: Reported on 2023)     • naloxone (NARCAN) 4 MG/0.1ML nasal spray 1 spray into the nostril(s) as directed by provider As Needed (opiate over sedation). 1 spray in 1  nostril every 2 to 3 minutes call 911 (Patient not taking: Reported on 7/13/2023) 2 each 2     No current facility-administered medications for this visit.       Past Medical History:  Past Medical History:   Diagnosis Date   • Acid reflux    • Anemia    • Anxiety    • Substance abuse          Social History     Socioeconomic History   • Marital status: Single   Tobacco Use   • Smoking status: Every Day     Packs/day: 1.00     Years: 7.00     Pack years: 7.00     Types: Cigarettes     Passive exposure: Current   • Smokeless tobacco: Never   Vaping Use   • Vaping Use: Never used   Substance and Sexual Activity   • Alcohol use: Not Currently   • Drug use: Not Currently     Types: Methamphetamines     Comment: per patient has been clean for about 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   • Sexual activity: Defer     Birth control/protection: Depo-provera         Family History   Problem Relation Age of Onset   • Heart disease Mother    • Heart disease Father    • Cancer Maternal Grandmother    • Cancer Maternal Grandfather          Mental Status Exam:   Hygiene:   good  Cooperation:  Cooperative  Eye Contact:  Good  Psychomotor Behavior:  Appropriate  Affect:  Appropriate  Mood: normal  Speech:  Normal  Thought Process:  Goal directed  Thought Content:  Normal  Suicidal:  None  Homicidal:  None  Hallucinations:  None  Delusion:  None  Memory:  Intact  Orientation:  Grossly intact  Reliability:  good  Insight:  Good  Judgement:  Good  Impulse Control:  Good         Review of Systems:  Review of Systems   Constitutional:  Negative for activity change, chills, diaphoresis and fatigue.   Respiratory:  Negative for apnea, cough and shortness of breath.    Cardiovascular:  Negative for chest pain, palpitations and leg swelling.   Gastrointestinal:  Negative for abdominal pain, constipation, diarrhea, nausea and vomiting.   Genitourinary:  Negative for difficulty urinating.   Musculoskeletal:  Negative for arthralgias.   Skin:  Negative for  "rash.   Neurological:  Negative for dizziness, weakness and headaches.   Psychiatric/Behavioral:  Positive for decreased concentration. Negative for agitation, self-injury, sleep disturbance and suicidal ideas. The patient is nervous/anxious.        Physical Exam:  Physical Exam  Vitals reviewed.   Constitutional:       General: She is not in acute distress.     Appearance: Normal appearance. She is not ill-appearing or toxic-appearing.   Pulmonary:      Effort: Pulmonary effort is normal.   Musculoskeletal:         General: Normal range of motion.   Neurological:      General: No focal deficit present.      Mental Status: She is alert and oriented to person, place, and time.   Psychiatric:         Attention and Perception: Attention and perception normal.         Mood and Affect: Mood normal. Mood is not anxious or depressed.         Speech: Speech normal.         Behavior: Behavior normal. Behavior is cooperative.         Thought Content: Thought content normal.         Cognition and Memory: Cognition and memory normal.         Judgment: Judgment normal.     Vital Signs:   /61   Pulse 85   Ht 165.1 cm (65\")   Wt 68.9 kg (152 lb)   BMI 25.29 kg/m²      Lab Results:   Telemedicine on 09/07/2023   Component Date Value Ref Range Status   • External Amphetamine Screen Urine 09/07/2023 Negative   Final   • External Benzodiazepine Screen Uri* 09/07/2023 Negative   Final   • External Cocaine Screen Urine 09/07/2023 Negative   Final   • External THC Screen Urine 09/07/2023 Negative   Final   • External Methadone Screen Urine 09/07/2023 Negative   Final   • External Methamphetamine Screen Ur* 09/07/2023 Negative   Final   • External Oxycodone Screen Urine 09/07/2023 Negative   Final   • External Buprenorphine Screen Urine 09/07/2023 Positive (A)   Final   • External MDMA 09/07/2023 Negative   Final   • External Opiates Screen Urine 09/07/2023 Negative   Final   Lab on 09/07/2023   Component Date Value Ref Range " Status   • Glucose 09/07/2023 78  65 - 99 mg/dL Final   • BUN 09/07/2023 9  6 - 20 mg/dL Final   • Creatinine 09/07/2023 0.91  0.57 - 1.00 mg/dL Final   • Sodium 09/07/2023 137  136 - 145 mmol/L Final   • Potassium 09/07/2023 4.3  3.5 - 5.2 mmol/L Final   • Chloride 09/07/2023 106  98 - 107 mmol/L Final   • CO2 09/07/2023 21.9 (L)  22.0 - 29.0 mmol/L Final   • Calcium 09/07/2023 9.3  8.6 - 10.5 mg/dL Final   • Total Protein 09/07/2023 7.4  6.0 - 8.5 g/dL Final   • Albumin 09/07/2023 4.6  3.5 - 5.2 g/dL Final   • ALT (SGPT) 09/07/2023 13  1 - 33 U/L Final   • AST (SGOT) 09/07/2023 18  1 - 32 U/L Final   • Alkaline Phosphatase 09/07/2023 78  39 - 117 U/L Final   • Total Bilirubin 09/07/2023 <0.2  0.0 - 1.2 mg/dL Final   • Globulin 09/07/2023 2.8  gm/dL Final   • A/G Ratio 09/07/2023 1.6  g/dL Final   • BUN/Creatinine Ratio 09/07/2023 9.9  7.0 - 25.0 Final   • Anion Gap 09/07/2023 9.1  5.0 - 15.0 mmol/L Final   • eGFR 09/07/2023 86.7  >60.0 mL/min/1.73 Final   • Iron 09/07/2023 12 (L)  37 - 145 mcg/dL Final   • Iron Saturation (TSAT) 09/07/2023 2 (L)  20 - 50 % Final   • Transferrin 09/07/2023 336  200 - 360 mg/dL Final   • TIBC 09/07/2023 501  298 - 536 mcg/dL Final   • Ferritin 09/07/2023 6.38 (L)  13.00 - 150.00 ng/mL Final   • TSH 09/07/2023 1.600  0.270 - 4.200 uIU/mL Final   • Reticulocyte % 09/07/2023 1.37  0.70 - 1.90 % Final   • Reticulocyte Absolute 09/07/2023 0.0623  0.0200 - 0.1300 10*6/mm3 Final   • Sed Rate 09/07/2023 5  0 - 20 mm/hr Final   • C-Reactive Protein 09/07/2023 <0.30  0.00 - 0.50 mg/dL Final   • LDH 09/07/2023 209  135 - 214 U/L Final   • WBC 09/07/2023 7.57  3.40 - 10.80 10*3/mm3 Final   • RBC 09/07/2023 4.58  3.77 - 5.28 10*6/mm3 Final   • Hemoglobin 09/07/2023 8.3 (L)  12.0 - 15.9 g/dL Final   • Hematocrit 09/07/2023 31.5 (L)  34.0 - 46.6 % Final   • MCV 09/07/2023 68.8 (L)  79.0 - 97.0 fL Final   • MCH 09/07/2023 18.1 (L)  26.6 - 33.0 pg Final   • MCHC 09/07/2023 26.3 (L)  31.5 - 35.7 g/dL  Final   • RDW 09/07/2023 18.5 (H)  12.3 - 15.4 % Final   • RDW-SD 09/07/2023 44.7  37.0 - 54.0 fl Final   • MPV 09/07/2023    Final    Unable to calculate.       • Platelets 09/07/2023 252  140 - 450 10*3/mm3 Final   • Neutrophil % 09/07/2023 67.0  42.7 - 76.0 % Final   • Lymphocyte % 09/07/2023 15.7 (L)  19.6 - 45.3 % Final   • Monocyte % 09/07/2023 8.9  5.0 - 12.0 % Final   • Eosinophil % 09/07/2023 6.2  0.3 - 6.2 % Final   • Basophil % 09/07/2023 1.7 (H)  0.0 - 1.5 % Final   • Immature Grans % 09/07/2023 0.5  0.0 - 0.5 % Final   • Neutrophils, Absolute 09/07/2023 5.07  1.70 - 7.00 10*3/mm3 Final   • Lymphocytes, Absolute 09/07/2023 1.19  0.70 - 3.10 10*3/mm3 Final   • Monocytes, Absolute 09/07/2023 0.67  0.10 - 0.90 10*3/mm3 Final   • Eosinophils, Absolute 09/07/2023 0.47 (H)  0.00 - 0.40 10*3/mm3 Final   • Basophils, Absolute 09/07/2023 0.13  0.00 - 0.20 10*3/mm3 Final   • Immature Grans, Absolute 09/07/2023 0.04  0.00 - 0.05 10*3/mm3 Final   • nRBC 09/07/2023 0.0  0.0 - 0.2 /100 WBC Final   • Anisocytosis 09/07/2023 Mod/2+  None Seen Final   • Hypochromia 09/07/2023 Mod/2+  None Seen Final   • Microcytes 09/07/2023 Large/3+  None Seen Final   • Platelet Morphology 09/07/2023 Normal  Normal Final   Telemedicine on 08/10/2023   Component Date Value Ref Range Status   • External Amphetamine Screen Urine 08/10/2023 Negative   Final   • External Benzodiazepine Screen Uri* 08/10/2023 Negative   Final   • External Cocaine Screen Urine 08/10/2023 Negative   Final   • External THC Screen Urine 08/10/2023 Negative   Final   • External Methadone Screen Urine 08/10/2023 Negative   Final   • External Methamphetamine Screen Ur* 08/10/2023 Negative   Final   • External Oxycodone Screen Urine 08/10/2023 Negative   Final   • External Buprenorphine Screen Urine 08/10/2023 Positive (A)   Final   • External MDMA 08/10/2023 Negative   Final   • External Opiates Screen Urine 08/10/2023 Negative   Final   Telemedicine on 07/13/2023    Component Date Value Ref Range Status   • External Amphetamine Screen Urine 07/13/2023 Negative   Final   • External Benzodiazepine Screen Uri* 07/13/2023 Negative   Final   • External Cocaine Screen Urine 07/13/2023 Negative   Final   • External THC Screen Urine 07/13/2023 Negative   Final   • External Methadone Screen Urine 07/13/2023 Negative   Final   • External Methamphetamine Screen Ur* 07/13/2023 Negative   Final   • External Oxycodone Screen Urine 07/13/2023 Negative   Final   • External Buprenorphine Screen Urine 07/13/2023 Negative   Final   • External MDMA 07/13/2023 Negative   Final   • External Opiates Screen Urine 07/13/2023 Negative   Final   Office Visit on 06/15/2023   Component Date Value Ref Range Status   • External Amphetamine Screen Urine 06/15/2023 Negative   Final   • External Benzodiazepine Screen Uri* 06/15/2023 Negative   Final   • External Cocaine Screen Urine 06/15/2023 Negative   Final   • External THC Screen Urine 06/15/2023 Negative   Final   • External Methadone Screen Urine 06/15/2023 Negative   Final   • External Methamphetamine Screen Ur* 06/15/2023 Negative   Final   • External Oxycodone Screen Urine 06/15/2023 Negative   Final   • External Buprenorphine Screen Urine 06/15/2023 Positive (A)   Final   • External MDMA 06/15/2023 Negative   Final   • External Opiates Screen Urine 06/15/2023 Negative   Final   Lab on 05/24/2023   Component Date Value Ref Range Status   • Ferritin 05/24/2023 3.40 (L)  13.00 - 150.00 ng/mL Final   • Iron 05/24/2023 14 (L)  37 - 145 mcg/dL Final   • Iron Saturation (TSAT) 05/24/2023 2 (L)  20 - 50 % Final   • Transferrin 05/24/2023 383 (H)  200 - 360 mg/dL Final   • TIBC 05/24/2023 571 (H)  298 - 536 mcg/dL Final         Assessment & Plan   Diagnoses and all orders for this visit:    1. Opioid type dependence, continuous (Primary)  -     buprenorphine-naloxone (SUBOXONE) 8-2 MG per SL tablet; Place 2 tablets under the tongue Daily.  Dispense: 56  tablet; Refill: 0    2. Medication management  -     [facility] Drug Screen    3. Impaired concentration  -     atomoxetine (Strattera) 40 MG capsule; Take 1 capsule by mouth Daily.  Dispense: 30 capsule; Refill: 0    4. Attention deficit hyperactivity disorder (ADHD), predominantly inattentive type  -     atomoxetine (Strattera) 40 MG capsule; Take 1 capsule by mouth Daily.  Dispense: 30 capsule; Refill: 0    5. Generalized anxiety disorder  -     FLUoxetine (PROzac) 20 MG capsule; Take 1 capsule by mouth Daily.  Dispense: 30 capsule; Refill: 0        Visit Diagnoses:    ICD-10-CM ICD-9-CM   1. Opioid type dependence, continuous  F11.20 304.01   2. Medication management  Z79.899 V58.69   3. Impaired concentration  R41.840 799.51   4. Attention deficit hyperactivity disorder (ADHD), predominantly inattentive type  F90.0 314.00   5. Generalized anxiety disorder  F41.1 300.02       PLAN:  Safety: No acute safety concerns  Risk Assessment: Risk of self-harm acutely is low. Risk of self-harm chronically is also low, but could be further elevated in the event of treatment noncompliance and/or AODA.    TREATMENT PLAN/GOALS: Continue supportive psychotherapy efforts and medications as indicated. Treatment and medication options discussed during today's visit. Patient acknowledged and verbally consented to continue with current treatment plan and was educated on the importance of compliance with treatment and follow-up appointments.    MEDICATION ISSUES:  DONG reviewed as expected.  Discussed medication options and treatment plan of prescribed medication as well as the risks, benefits, and side effects including potential falls, possible impaired driving and metabolic adversities among others. Patient is agreeable to call the office with any worsening of symptoms or onset of side effects. Patient is agreeable to call 911 or go to the nearest ER should he/she begin having SI/HI. No medication side effects or related  complaints today.     MEDS ORDERED DURING VISIT:  New Medications Ordered This Visit   Medications   • atomoxetine (Strattera) 40 MG capsule     Sig: Take 1 capsule by mouth Daily.     Dispense:  30 capsule     Refill:  0   • buprenorphine-naloxone (SUBOXONE) 8-2 MG per SL tablet     Sig: Place 2 tablets under the tongue Daily.     Dispense:  56 tablet     Refill:  0     NADEAN:[id number]   • FLUoxetine (PROzac) 20 MG capsule     Sig: Take 1 capsule by mouth Daily.     Dispense:  30 capsule     Refill:  0       No follow-ups on file.           This document has been electronically signed by ALMA Garcia  September 7, 2023 16:00 EDT      Part of this note may be an electronic transcription/translation of spoken language to printed text using the Dragon Dictation System.

## 2023-09-07 NOTE — PROGRESS NOTES
Venipuncture Blood Specimen Collection  Venipuncture performed in left arm by Johana Muller MA with good hemostasis. Patient tolerated the procedure well without complications.   09/07/23   Johana Muller MA

## 2023-09-07 NOTE — PROGRESS NOTES
"DATE OF CONSULTATION:  2023    REASON FOR REFERRAL: TERA    REFERRING PHYSICIAN:  ALMA Farrell    CHIEF COMPLAINT:  Fatigue, weakness, shortness of breath on exertion, occasional dizziness    HISTORY OF PRESENT ILLNESS:   Julieta Beasley is a very pleasant 33 y.o. female who is being seen today at the request of ALMA Farrell for evaluation and treatment of iron deficiency anemia.  Ms. Beasley reports following with her PCP monthly with blood testing every 2 months.  She says she has been struggling with TERA since \"basically birth\".  She has been on oral iron in the past but was unable to tolerate this due to extreme stomach upset.  She is also had IV Venofer 200 mg x 1 dose around 3 to 4 months ago.  At present, she reports extreme weakness and fatigue, occasional dizziness, and shortness of breath with exertion that has gotten worse within the last 3 months.  She denies any aches and pains.  No reports of obvious blood loss from any source.  She is on the Depo shot therefore she does not have any periods, but reports normal regular flow periods prior to starting the shot.  She states her appetite is \"not great\", she only eats around 1x per day, she has lost about 20 pounds over the last 7 months.  She reports having an EGD and colonoscopy done around 1 month ago at [place], we will try to get these records.  No known kidney disease.  Otherwise no specific complaints today.  Her most recent CBC done on 2023 showed a hemoglobin of 8.5 and hematocrit of 31.1.  WBCs and platelets were both normal.  Iron panel/ferritin revealed iron of 17, iron sat of 4%, and ferritin of 7.1.    PAST MEDICAL HISTORY:  Past Medical History:   Diagnosis Date    Acid reflux     Anemia     Anxiety     Substance abuse      PAST SURGICAL HISTORY:  Past Surgical History:   Procedure Laterality Date    APPENDECTOMY       SECTION      ENDOSCOPY N/A 10/28/2022    Procedure: ESOPHAGOGASTRODUODENOSCOPY;  " Surgeon: Jayro Bar MD;  Location: [facility];  Service: Gastroenterology;  Laterality: N/A;     FAMILY HISTORY:  Family History   Problem Relation Age of Onset    Heart disease Mother     Heart disease Father     Cancer Maternal Grandmother     Cancer Maternal Grandfather      SOCIAL HISTORY:  Social History     Socioeconomic History    Marital status: Single   Tobacco Use    Smoking status: Every Day     Packs/day: 1.00     Years: 7.00     Pack years: 7.00     Types: Cigarettes     Passive exposure: Current    Smokeless tobacco: Never   Vaping Use    Vaping Use: Never used   Substance and Sexual Activity    Alcohol use: Not Currently    Drug use: Not Currently     Types: Methamphetamines     Comment: per patient has been clean for about 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    Sexual activity: Defer     Birth control/protection: Depo-provera     MEDICATIONS:  The current medication list was reviewed in the EMR    Current Outpatient Medications:     buprenorphine-naloxone (SUBOXONE) 8-2 MG per SL tablet, Place 2 tablets under the tongue Daily., Disp: 56 tablet, Rfl: 0    estradiol cypionate (DEPO-ESTRADIOL) 5 MG/ML injection, Inject  into the appropriate muscle as directed by prescriber Every 28 (Twenty-Eight) Days., Disp: , Rfl:     Neurontin 600 MG tablet, Take 1 tablet by mouth 3 (Three) Times a Day., Disp: , Rfl:     atomoxetine (Strattera) 40 MG capsule, Take 1 capsule by mouth Daily. (Patient not taking: Reported on 9/7/2023), Disp: 30 capsule, Rfl: 0    cloNIDine (CATAPRES) 0.1 MG tablet, TAKE 1 TABLET BY MOUTH 2 TIMES DAILY AS NEEDED (Patient not taking: Reported on 9/7/2023), Disp: 60 tablet, Rfl: 0    FLUoxetine (PROzac) 20 MG capsule, Take 1 capsule by mouth Daily. (Patient not taking: Reported on 9/7/2023), Disp: 30 capsule, Rfl: 0    ibuprofen (ADVIL,MOTRIN) 200 MG tablet, Take 4 tablets by mouth Every 12 (Twelve) Hours. (Patient not taking: Reported on 9/7/2023), Disp: , Rfl:     naloxone (NARCAN) 4 MG/0.1ML nasal  "spray, 1 spray into the nostril(s) as directed by provider As Needed (opiate over sedation). 1 spray in 1 nostril every 2 to 3 minutes call 911 (Patient not taking: Reported on 7/13/2023), Disp: 2 each, Rfl: 2    ALLERGIES:    Allergies   Allergen Reactions    Diphenhydramine     Sulfamethoxazole-Trimethoprim      REVIEW OF SYSTEMS:    A comprehensive 14 point review of systems was performed.  Significant findings as mentioned above.  All other systems reviewed and are negative.      ECOG score: 0   Physical Exam  Vital Signs: /70   Pulse 102   Temp 97.1 °F (36.2 °C) (Temporal)   Resp 18   Ht 165.1 cm (65\")   Wt 69.2 kg (152 lb 8 oz)   SpO2 96%   BMI 25.38 kg/m²   General: Well developed, well nourished, alert and oriented x 3, in no acute distress.   Head: ATNC   Eyes: PERRL, No evidence of conjunctivitis.   Nose: No nasal discharge.   Mouth: Oral mucosal membranes moist. No oral ulceration or hemorrhages.   Neck: Neck supple. No thyromegaly. No JVD.   Lungs: Clear in all fields to A&P. No wheezing   Heart: S1, S2. Regular rate and rhythm. No murmurs, rubs, or gallops.   Abdomen: Soft. Bowel sounds are normoactive. Nontender with palpation. No Hepatosplenomegaly can be appreciated.   Extremities: No cyanosis or edema. Peripheral pulses palpable and equal bilaterally.   Integumentary: No rash, sores, erythema or nodules. No blistering, bruising, or dry skin.   Hem/Lymph Nodes: No palpable cervical, submandibular, supraclavicular, axillary  lymphadenopathy noted. No petechiae, purpura or ecchymosis noted.   Neurologic: Grossly non-focal exam    Pain Score:  Pain Score    09/07/23 1339   PainSc: 0-No pain       PHQ-Score Total:  PHQ-9 Total Score: 0    PATHOLOGY:    ENDOSCOPY:      IMAGING:  XR Knee 3 View Right    Result Date: 5/11/2023    No acute findings in the right knee.  This report was finalized on 5/11/2023 2:02 PM by Dr. Jose Raul Hu MD.         Previous Labs:   CBC Auto Differential          "   Component  Ref Range & Units 10 mo ago  (10/24/22) 7 yr ago  (6/21/16) 9 yr ago  (9/3/14) 9 yr ago  (7/7/14) 9 yr ago  (4/10/14)   WBC  3.40 - 10.80 10*3/mm3 6.81 8.67 R 14.4 High  R 8.9 R    RBC  3.77 - 5.28 10*6/mm3 4.74 4.81 R 4.71 R 4.46 R    Hemoglobin  12.0 - 15.9 g/dL 9.4 Low  14.9 R 14.0 R 13.8 R    Hematocrit  34.0 - 46.6 % 33.5 Low  44.5 R 43.1 R 41.3 R    MCV  79.0 - 97.0 fL 70.7 Low  92.5 R 91.5 R 92.6 R    MCH  26.6 - 33.0 pg 19.8 Low  31.0 R 29.7 R 30.9 R    MCHC  31.5 - 35.7 g/dL 28.1 Low  33.5 R 32.5 Low  R 33.4 R    RDW  12.3 - 15.4 % 15.5 High  11.9 R 12.8 R 12.9 R    RDW-SD  37.0 - 54.0 fl 39.0 39.8      MPV  6.0 - 12.0 fL 12.0 12.5 High  R 11.6 High  R 12.2 High  R    Platelets  140 - 450 10*3/mm3 254 217 R 215 R 187 R 201 R   Neutrophil %  42.7 - 76.0 % 66.2 71.5 High  R 75.9 High  R 55.0 R    Lymphocyte %  19.6 - 45.3 % 18.8 Low  19.5 Low  R 11.4 Low  R 30.6 R    Monocyte %  5.0 - 12.0 % 6.2 5.2 R 8.8 R 10.0 R    Eosinophil %  0.3 - 6.2 % 7.2 High  3.2 R 3.3 R 3.4 R    Basophil %  0.0 - 1.5 % 1.3 0.6 R 0.4 R 0.8 R    Immature Grans %  0.0 - 0.5 % 0.3 0.0 0.20 R 0.20 R    Neutrophils, Absolute  1.70 - 7.00 10*3/mm3 4.51 6.20 R 10.9 High  R 4.9 R    Lymphocytes, Absolute  0.70 - 3.10 10*3/mm3 1.28 1.69 R 1.6 R 2.7 R    Monocytes, Absolute  0.10 - 0.90 10*3/mm3 0.42 0.45 1.3 High  R 0.9 R    Eosinophils, Absolute  0.00 - 0.40 10*3/mm3 0.49 High  0.28 R 0.5 R 0.3 R    Basophils, Absolute  0.00 - 0.20 10*3/mm3 0.09 0.05 R 0.1 R 0.1 R    Immature Grans, Absolute  0.00 - 0.05 10*3/mm3 0.02 0.00 R      nRBC  0.0 - 0.2 /100 WBC 0.0                                    RECENT LABS:  Lab Results   Component Value Date    WBC 7.57 09/07/2023    HGB 8.3 (L) 09/07/2023    HCT 31.5 (L) 09/07/2023    MCV 68.8 (L) 09/07/2023    RDW 18.5 (H) 09/07/2023     09/07/2023    NEUTRORELPCT 67.0 09/07/2023    LYMPHORELPCT 15.7 (L) 09/07/2023    MONORELPCT 8.9 09/07/2023    EOSRELPCT 6.2 09/07/2023    BASORELPCT  1.7 (H) 09/07/2023    NEUTROABS 5.07 09/07/2023    LYMPHSABS 1.19 09/07/2023     Lab Results   Component Value Date     09/07/2023    K 4.3 09/07/2023    CO2 21.9 (L) 09/07/2023     09/07/2023    BUN 9 09/07/2023    CREATININE 0.91 09/07/2023    EGFRIFNONA 68 06/21/2016    GLUCOSE 78 09/07/2023    CALCIUM 9.3 09/07/2023    ALKPHOS 78 09/07/2023    AST 18 09/07/2023    ALT 13 09/07/2023    BILITOT <0.2 09/07/2023    ALBUMIN 4.6 09/07/2023    PROTEINTOT 7.4 09/07/2023       Lab Results   Component Value Date/Time     09/07/2023 02:18 PM     Lab Results   Component Value Date    FERRITIN 6.38 (L) 09/07/2023    IRON 12 (L) 09/07/2023    TIBC 501 09/07/2023    LABIRON 2 (L) 09/07/2023     Reticulocyte %   (9/7/2023)  0.70 - 1.90 % 1.37   Reticulocyte Absolute  0.0200 - 0.1300 10*6/mm3 0.0623     Sed Rate  (9/7/2023)  0 - 20 mm/hr 5     C-Reactive Protein   (9/7/2023)  0.00 - 0.50 mg/dL <0.30     TSH    (9/7/2023)  0.270 - 4.200 uIU/mL 1.600       ASSESSMENT & PLAN:  Julieta Beasley is a very pleasant 33 y.o. female with    TERA   - Previous labs were reviewed.  CBCs available from 2016 and 2014 showed a normal hemoglobin and hematocrit.  However around October 2022, which is the next available lab work I have to review, shows a decreased H/H at 9.4 and 33.5.  On 5/10/2023 hemoglobin was 8.3 with hematocrit of 34.3; on 5/24/2023 her iron panel showed a serum iron of 14 and iron saturation of 2%, with a ferritin of 3.4; on 8/14/2023, her most recent lab work showed iron of 17 with iron sat of 4%, and a ferritin of 7.1, H&H showed 8.5 and 31.1.  WBCs and platelets are always normal.  - She has been on oral iron in the past but was unable to tolerate this due to extreme stomach upset.  She is also had IV Venofer 200 mg x 1 dose around 3 to 4 months ago.    - At present, she reports extreme weakness and fatigue, occasional dizziness, and shortness of breath with exertion that has gotten worse within the last 3  "months. She states her appetite is \"not great\", she only eats around 1x per day, she has lost about 20 pounds over the last 7 months.   - She denies any aches and pains.  No reports of obvious blood loss from any source.  She is on the Depo shot therefore she does not have any periods, but reports normal regular flow periods prior to starting the shot.  No known kidney disease.  - EGD and colonoscopy done around 1 month ago at [place], we will try to get these records.    - Obtained repeat CBC today which showed Hgb 8.3/Hct 31.5 which is stable from last CBC ~1 month ago. Iron panel/ferritin show iron stores continue to drop (serum iron 12, iron sat 2%, ferritin 6.38) from most recent lab work. Therefore, will replace with IV iron pending insurance approval.   - I have sent additional labs today to further evaluate anemia including CMP, copper, zinc, B12, folate, TSH, peripheral blood smear, reticulocytes, haptoglobin, ESR, CRP, LDH and tissue transglutaminase.  - We will plan to follow-up with her in about 6 weeks after replacement with repeat labs and to discuss pending work-up.  In the meantime patient knows to make sure she is eating iron rich foods.  Will notify patient if any further intervention is needed in the interim.      ACO / RICHAR/Other  Quality measures  -  Julieta Beasley did not receive 2022 flu vaccine.  -  Julieta Beasley reports a pain score of 0.   -  Current outpatient and discharge medications have been reconciled for the patient.  Reviewed by: ALMA Levi    The patient was in agreement with the plan and all questions were answered to her satisfaction. Thank you so much for allowing us to participate in the care of Julieta Beasley . Please do not hesitate to contact us with any questions or concerns.     A total of 45 minutes were spent coordinating this patient’s care in clinic today; more than 50% of this time was face-to-face with the patient, reviewing her interim medical " history and counseling on the current treatment and followup plan. All questions were answered to her satisfaction.      Electronically Signed by: ALMA Levi , September 7, 2023 15:24 EDT       CC:   ALMA Farrell Paul A,

## 2023-09-08 LAB
FOLATE SERPL-MCNC: 11.1 NG/ML (ref 4.78–24.2)
HAPTOGLOB SERPL-MCNC: 189 MG/DL (ref 30–200)
TTG IGA SER-ACNC: <2 U/ML (ref 0–3)
VIT B12 BLD-MCNC: 450 PG/ML (ref 211–946)

## 2023-09-11 LAB — REF LAB TEST METHOD: NORMAL

## 2023-09-13 LAB
COPPER SERPL-MCNC: 148 UG/DL (ref 80–158)
ZINC SERPL-MCNC: 102 UG/DL (ref 44–115)

## 2023-09-18 ENCOUNTER — INFUSION (OUTPATIENT)
Dept: ONCOLOGY | Facility: HOSPITAL | Age: 33
End: 2023-09-18
Payer: COMMERCIAL

## 2023-09-18 VITALS
SYSTOLIC BLOOD PRESSURE: 104 MMHG | TEMPERATURE: 96.8 F | OXYGEN SATURATION: 100 % | HEART RATE: 75 BPM | RESPIRATION RATE: 18 BRPM | DIASTOLIC BLOOD PRESSURE: 56 MMHG

## 2023-09-18 DIAGNOSIS — K90.9 MALABSORPTION OF IRON: Primary | ICD-10-CM

## 2023-09-18 DIAGNOSIS — D50.9 IRON DEFICIENCY ANEMIA, UNSPECIFIED IRON DEFICIENCY ANEMIA TYPE: ICD-10-CM

## 2023-09-18 PROCEDURE — 96366 THER/PROPH/DIAG IV INF ADDON: CPT

## 2023-09-18 PROCEDURE — 96365 THER/PROPH/DIAG IV INF INIT: CPT

## 2023-09-18 PROCEDURE — 25010000002 IRON SUCROSE PER 1 MG

## 2023-09-18 RX ORDER — SODIUM CHLORIDE 9 MG/ML
250 INJECTION, SOLUTION INTRAVENOUS ONCE
Status: COMPLETED | OUTPATIENT
Start: 2023-09-18 | End: 2023-09-18

## 2023-09-18 RX ADMIN — SODIUM CHLORIDE 250 ML: 9 INJECTION, SOLUTION INTRAVENOUS at 14:06

## 2023-09-18 RX ADMIN — IRON SUCROSE 300 MG: 20 INJECTION, SOLUTION INTRAVENOUS at 14:06

## 2023-09-21 ENCOUNTER — INFUSION (OUTPATIENT)
Dept: ONCOLOGY | Facility: HOSPITAL | Age: 33
End: 2023-09-21
Payer: COMMERCIAL

## 2023-09-21 VITALS
TEMPERATURE: 96.6 F | RESPIRATION RATE: 18 BRPM | SYSTOLIC BLOOD PRESSURE: 105 MMHG | DIASTOLIC BLOOD PRESSURE: 51 MMHG | OXYGEN SATURATION: 95 % | HEART RATE: 79 BPM

## 2023-09-21 DIAGNOSIS — K90.9 MALABSORPTION OF IRON: Primary | ICD-10-CM

## 2023-09-21 DIAGNOSIS — D50.9 IRON DEFICIENCY ANEMIA, UNSPECIFIED IRON DEFICIENCY ANEMIA TYPE: ICD-10-CM

## 2023-09-21 PROCEDURE — 96365 THER/PROPH/DIAG IV INF INIT: CPT

## 2023-09-21 PROCEDURE — 96366 THER/PROPH/DIAG IV INF ADDON: CPT

## 2023-09-21 PROCEDURE — 25010000002 IRON SUCROSE PER 1 MG

## 2023-09-21 RX ORDER — SODIUM CHLORIDE 9 MG/ML
250 INJECTION, SOLUTION INTRAVENOUS ONCE
Status: COMPLETED | OUTPATIENT
Start: 2023-09-21 | End: 2023-09-21

## 2023-09-21 RX ADMIN — SODIUM CHLORIDE 250 ML: 9 INJECTION, SOLUTION INTRAVENOUS at 14:05

## 2023-09-21 RX ADMIN — IRON SUCROSE 300 MG: 20 INJECTION, SOLUTION INTRAVENOUS at 14:05

## 2023-09-27 ENCOUNTER — INFUSION (OUTPATIENT)
Dept: ONCOLOGY | Facility: HOSPITAL | Age: 33
End: 2023-09-27
Payer: COMMERCIAL

## 2023-09-27 VITALS
HEART RATE: 77 BPM | RESPIRATION RATE: 16 BRPM | OXYGEN SATURATION: 98 % | TEMPERATURE: 97.5 F | DIASTOLIC BLOOD PRESSURE: 49 MMHG | SYSTOLIC BLOOD PRESSURE: 107 MMHG

## 2023-09-27 DIAGNOSIS — K90.9 MALABSORPTION OF IRON: Primary | ICD-10-CM

## 2023-09-27 DIAGNOSIS — D50.9 IRON DEFICIENCY ANEMIA, UNSPECIFIED IRON DEFICIENCY ANEMIA TYPE: ICD-10-CM

## 2023-09-27 PROCEDURE — 96366 THER/PROPH/DIAG IV INF ADDON: CPT

## 2023-09-27 PROCEDURE — 96365 THER/PROPH/DIAG IV INF INIT: CPT

## 2023-09-27 PROCEDURE — 25010000002 IRON SUCROSE PER 1 MG

## 2023-09-27 RX ORDER — SODIUM CHLORIDE 9 MG/ML
250 INJECTION, SOLUTION INTRAVENOUS ONCE
Status: COMPLETED | OUTPATIENT
Start: 2023-09-27 | End: 2023-09-27

## 2023-09-27 RX ADMIN — IRON SUCROSE 300 MG: 20 INJECTION, SOLUTION INTRAVENOUS at 15:37

## 2023-09-27 RX ADMIN — SODIUM CHLORIDE 250 ML: 9 INJECTION, SOLUTION INTRAVENOUS at 15:36

## 2023-10-04 ENCOUNTER — TELEMEDICINE (OUTPATIENT)
Dept: PSYCHIATRY | Facility: CLINIC | Age: 33
End: 2023-10-04
Payer: COMMERCIAL

## 2023-10-04 VITALS
WEIGHT: 145.6 LBS | HEART RATE: 108 BPM | BODY MASS INDEX: 24.26 KG/M2 | OXYGEN SATURATION: 98 % | DIASTOLIC BLOOD PRESSURE: 70 MMHG | HEIGHT: 65 IN | SYSTOLIC BLOOD PRESSURE: 126 MMHG

## 2023-10-04 DIAGNOSIS — F90.0 ATTENTION DEFICIT HYPERACTIVITY DISORDER (ADHD), PREDOMINANTLY INATTENTIVE TYPE: ICD-10-CM

## 2023-10-04 DIAGNOSIS — F41.1 GENERALIZED ANXIETY DISORDER: ICD-10-CM

## 2023-10-04 DIAGNOSIS — F11.20 OPIOID TYPE DEPENDENCE, CONTINUOUS: Primary | ICD-10-CM

## 2023-10-04 DIAGNOSIS — R41.840 IMPAIRED CONCENTRATION: ICD-10-CM

## 2023-10-04 DIAGNOSIS — Z79.899 MEDICATION MANAGEMENT: ICD-10-CM

## 2023-10-04 RX ORDER — BUPRENORPHINE HYDROCHLORIDE AND NALOXONE HYDROCHLORIDE DIHYDRATE 8; 2 MG/1; MG/1
2 TABLET SUBLINGUAL DAILY
Qty: 56 TABLET | Refills: 0 | Status: SHIPPED | OUTPATIENT
Start: 2023-10-04

## 2023-10-04 RX ORDER — FLUOXETINE HYDROCHLORIDE 20 MG/1
20 CAPSULE ORAL DAILY
Qty: 30 CAPSULE | Refills: 0 | Status: SHIPPED | OUTPATIENT
Start: 2023-10-04 | End: 2024-10-03

## 2023-10-04 RX ORDER — ALBUTEROL SULFATE 90 UG/1
AEROSOL, METERED RESPIRATORY (INHALATION)
COMMUNITY
Start: 2023-09-25

## 2023-10-04 RX ORDER — ATOMOXETINE 40 MG/1
40 CAPSULE ORAL DAILY
Qty: 30 CAPSULE | Refills: 0 | Status: SHIPPED | OUTPATIENT
Start: 2023-10-04 | End: 2024-10-03

## 2023-10-04 NOTE — PROGRESS NOTES
This provider is located at [facility]. The Patient is seen remotely located at the [facility] ([facility]) using Video. Patient is being seen via telehealth and confirm that they are in a secure environment for this session. The patient's condition being diagnosed/treated is appropriate for telemedicine. Provider identified as Xu Saeed as well as credentials APRN MSN FNP-C PUMA-JULISSA.   The client/patient gave consent to be seen remotely, and when consent is given they understand that the consent allows for patient identifiable information to be sent to a third party as needed.   They may refuse to be seen remotely at any time. The electronic data is encrypted and password protected, and the patient has been advised of the potential risks to privacy not withstanding such measures.    Chief Complaint/History of Present Illness: Follow Up buprenorphine/naloxone Medicated Assisted Treatment for Opiate Use Disorder     Patient/Client Concerns/Updates: Continue Prozac and clonidine for anxiety, Strattera for difficulty concentrating   -Patient reports he is doing well and having a good month; no acute concerns today  -Reports since initiating Strattera she has felt improved concentration and overall feels this medication will be beneficial; no concerns or adverse side effects  -Reports mood is stable and denies concerning depressive or anxious concerns, no suicidal or homicidal thoughts and sleeping well    Triggers (Persons/Places/Things/Events/Thought/Emotions): Busy schedule and difficulty concentrating    Cravings/Substance Use: Denies cravings or use of illicit substances    Relapse Prevention: Counseling    Urine Drug Screen (today's visit) discussed: Positive buprenorphine, otherwise negative for substances tested    UDS Confirmation (Most recent/Resulted): Positive buprenorphine/nor-buprenorphine, no concerns for urine tampering otherwise positive gabapentin    Most recent pertinent  laboratory studies reviewed: 10/24/2022-hepatic function within normal limits, Cr WNL        DONG (PDMP) Reviewed for Current/Active Medications: buprenorphine/naloxone and gabapentin as reviewed today    Past Surgical History:  Past Surgical History:   Procedure Laterality Date   • APPENDECTOMY     •  SECTION     • ENDOSCOPY N/A 10/28/2022    Procedure: ESOPHAGOGASTRODUODENOSCOPY;  Surgeon: Jayro Bar MD;  Location: [facility];  Service: Gastroenterology;  Laterality: N/A;       Problem List:  Patient Active Problem List   Diagnosis   • Esophageal dysphagia   • Iron deficiency anemia, unspecified   • Malabsorption of iron       Allergy:   Allergies   Allergen Reactions   • Diphenhydramine    • Sulfamethoxazole-Trimethoprim         Current Medications:   Current Outpatient Medications   Medication Sig Dispense Refill   • atomoxetine (Strattera) 40 MG capsule Take 1 capsule by mouth Daily. 30 capsule 0   • buprenorphine-naloxone (SUBOXONE) 8-2 MG per SL tablet Place 2 tablets under the tongue Daily. 56 tablet 0   • cloNIDine (CATAPRES) 0.1 MG tablet TAKE 1 TABLET BY MOUTH 2 TIMES DAILY AS NEEDED 60 tablet 0   • estradiol cypionate (DEPO-ESTRADIOL) 5 MG/ML injection Inject  into the appropriate muscle as directed by prescriber Every 28 (Twenty-Eight) Days. (Patient not taking: Reported on 2023)     • FLUoxetine (PROzac) 20 MG capsule Take 1 capsule by mouth Daily. 30 capsule 0   • ibuprofen (ADVIL,MOTRIN) 200 MG tablet Take 4 tablets by mouth Every 12 (Twelve) Hours.     • metroNIDAZOLE (FLAGYL) 500 MG tablet      • naloxone (NARCAN) 4 MG/0.1ML nasal spray 1 spray into the nostril(s) as directed by provider As Needed (opiate over sedation). 1 spray in 1 nostril every 2 to 3 minutes call 911 (Patient not taking: Reported on 2023) 2 each 2   • Neurontin 600 MG tablet Take 1 tablet by mouth 3 (Three) Times a Day.     • pantoprazole (PROTONIX) 40 MG EC tablet Take 1 tablet by mouth Daily.     •  valACYclovir (VALTREX) 1000 MG tablet        No current facility-administered medications for this visit.       Past Medical History:  Past Medical History:   Diagnosis Date   • Acid reflux    • Anemia    • Anxiety    • Substance abuse          Social History     Socioeconomic History   • Marital status: Single   Tobacco Use   • Smoking status: Every Day     Packs/day: 1.00     Years: 7.00     Pack years: 7.00     Types: Cigarettes     Passive exposure: Current   • Smokeless tobacco: Never   Vaping Use   • Vaping Use: Never used   Substance and Sexual Activity   • Alcohol use: Not Currently   • Drug use: Not Currently     Types: Methamphetamines     Comment: per patient has been clean for about 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   • Sexual activity: Defer     Birth control/protection: Depo-provera         Family History   Problem Relation Age of Onset   • Heart disease Mother    • Heart disease Father    • Cancer Maternal Grandmother    • Cancer Maternal Grandfather          Mental Status Exam:   Hygiene:   good  Cooperation:  Cooperative  Eye Contact:  Good  Psychomotor Behavior:  Appropriate  Affect:  Appropriate  Mood: normal  Speech:  Normal  Thought Process:  Goal directed  Thought Content:  Normal  Suicidal:  None  Homicidal:  None  Hallucinations:  None  Delusion:  None  Memory:  Intact  Orientation:  Grossly intact  Reliability:  good  Insight:  Good  Judgement:  Good  Impulse Control:  Good         Review of Systems:  Review of Systems   Constitutional:  Negative for activity change, chills, diaphoresis and fatigue.   Respiratory:  Negative for apnea, cough and shortness of breath.    Cardiovascular:  Negative for chest pain, palpitations and leg swelling.   Gastrointestinal:  Negative for abdominal pain, constipation, diarrhea, nausea and vomiting.   Genitourinary:  Negative for difficulty urinating.   Musculoskeletal:  Negative for arthralgias.   Skin:  Negative for rash.   Neurological:  Negative for dizziness,  weakness and headaches.   Psychiatric/Behavioral:  Positive for decreased concentration. Negative for agitation, self-injury, sleep disturbance and suicidal ideas. The patient is nervous/anxious.        Physical Exam:  Physical Exam  Vitals reviewed.   Constitutional:       General: She is not in acute distress.     Appearance: Normal appearance. She is not ill-appearing or toxic-appearing.   Pulmonary:      Effort: Pulmonary effort is normal.   Musculoskeletal:         General: Normal range of motion.   Neurological:      General: No focal deficit present.      Mental Status: She is alert and oriented to person, place, and time.   Psychiatric:         Attention and Perception: Attention and perception normal.         Mood and Affect: Mood normal. Mood is not anxious or depressed.         Speech: Speech normal.         Behavior: Behavior normal. Behavior is cooperative.         Thought Content: Thought content normal.         Cognition and Memory: Cognition and memory normal.         Judgment: Judgment normal.     Vital Signs:   There were no vitals taken for this visit.     Lab Results:   Telemedicine on 09/07/2023   Component Date Value Ref Range Status   • External Amphetamine Screen Urine 09/07/2023 Negative   Final   • External Benzodiazepine Screen Uri* 09/07/2023 Negative   Final   • External Cocaine Screen Urine 09/07/2023 Negative   Final   • External THC Screen Urine 09/07/2023 Negative   Final   • External Methadone Screen Urine 09/07/2023 Negative   Final   • External Methamphetamine Screen Ur* 09/07/2023 Negative   Final   • External Oxycodone Screen Urine 09/07/2023 Negative   Final   • External Buprenorphine Screen Urine 09/07/2023 Positive (A)   Final   • External MDMA 09/07/2023 Negative   Final   • External Opiates Screen Urine 09/07/2023 Negative   Final   Lab on 09/07/2023   Component Date Value Ref Range Status   • Glucose 09/07/2023 78  65 - 99 mg/dL Final   • BUN 09/07/2023 9  6 - 20 mg/dL  Final   • Creatinine 09/07/2023 0.91  0.57 - 1.00 mg/dL Final   • Sodium 09/07/2023 137  136 - 145 mmol/L Final   • Potassium 09/07/2023 4.3  3.5 - 5.2 mmol/L Final   • Chloride 09/07/2023 106  98 - 107 mmol/L Final   • CO2 09/07/2023 21.9 (L)  22.0 - 29.0 mmol/L Final   • Calcium 09/07/2023 9.3  8.6 - 10.5 mg/dL Final   • Total Protein 09/07/2023 7.4  6.0 - 8.5 g/dL Final   • Albumin 09/07/2023 4.6  3.5 - 5.2 g/dL Final   • ALT (SGPT) 09/07/2023 13  1 - 33 U/L Final   • AST (SGOT) 09/07/2023 18  1 - 32 U/L Final   • Alkaline Phosphatase 09/07/2023 78  39 - 117 U/L Final   • Total Bilirubin 09/07/2023 <0.2  0.0 - 1.2 mg/dL Final   • Globulin 09/07/2023 2.8  gm/dL Final   • A/G Ratio 09/07/2023 1.6  g/dL Final   • BUN/Creatinine Ratio 09/07/2023 9.9  7.0 - 25.0 Final   • Anion Gap 09/07/2023 9.1  5.0 - 15.0 mmol/L Final   • eGFR 09/07/2023 86.7  >60.0 mL/min/1.73 Final   • Iron 09/07/2023 12 (L)  37 - 145 mcg/dL Final   • Iron Saturation (TSAT) 09/07/2023 2 (L)  20 - 50 % Final   • Transferrin 09/07/2023 336  200 - 360 mg/dL Final   • TIBC 09/07/2023 501  298 - 536 mcg/dL Final   • Ferritin 09/07/2023 6.38 (L)  13.00 - 150.00 ng/mL Final   • Copper 09/07/2023 148  80 - 158 ug/dL Final                                    Detection Limit = 5   • Zinc 09/07/2023 102  44 - 115 ug/dL Final                                    Detection Limit = 5   • Vitamin B-12 09/07/2023 450  211 - 946 pg/mL Final   • Folate 09/07/2023 11.10  4.78 - 24.20 ng/mL Final   • TSH 09/07/2023 1.600  0.270 - 4.200 uIU/mL Final   • Reference Lab Report 09/07/2023    Final                    Value:[facility]  [address] [place] [address]  Phone: [phone] or [phone]  Fax: [phone]  Vikas Lyon M.D., Medical Director    PATIENT NAME                                 LABORATORY NO.  [id number]   SARAY TURNER                              [id number]  [id number]  [facility]                           AGE                SEX     N            CLIENT REF #  [id number]       1990   F       [id number]    [id number]  [address] [place] [address]                               REQUESTING M.D.       ATTENDING M.D..        COPY TO..  SOCORRO SALAS PAUL    DATE COLLECTED        DATE RECEIVED          DATE REPORTED  2023    DIAGNOSIS:  PERIPHERAL SMEAR  Microcytic anemia with mild anisocytosis including elliptocytes.  Normal WBC count with mild eosinophilia.  Granulocytes show normal  morphology and no circulating blasts identified.  Adequate platelets with                           occasional giant platelets.      CLINICAL HISTORY:  Iron deficiency anemia, unspecified iron deficiency anemia type    CLINICAL LABORATORY DATA  WBC        7.57 x10/3/-L    RDW         18.5%  HGB        8.3g/dl          PLT         252 x10/3/-L  HCT        31.5%            LYMPHS      15.7%  MCV        68.8fL           NEUTS       67.0%  MONO        8.9%  EOS         6.2%  BASO        1.7%    PERIPHERAL SMEAR MICROSCOPIC DESCRIPTION:  Liz stained smears are reviewed microscopically. See diagnosis for details.    Professional interpretation rendered by Tiffanie Rod M.D., MPH at [facility], [address] [place] [address].    GROSS DESCRIPTION:  RECEIVED 2 SLIDES FOR REVIEW- LM 23    REVIEWED, DIAGNOSED AND ELECTRONICALLY  SIGNED BY:    Tiffanie Rod M.D., MPH  CPT CODES:        75882     • Reticulocyte % 2023 1.37  0.70 - 1.90 % Final   • Reticulocyte Absolute 2023 0.0623  0.0200 - 0.1300 10*6/mm3 Final   • Haptoglobin 2023 189  30 - 200 mg/dL Final   • Sed Rate 2023 5  0 - 20 mm/hr Final   • C-Reactive Protein 2023 <0.30  0.00 - 0.50 mg/dL Final   • LDH 2023 209  135 - 214 U/L Final   • Tissue Transglutaminase IgA 2023 <2  0 - 3 U/mL Final                                  Negative        0 -  3                                 Weak Positive   4 - 10                                Positive           >10   Tissue Transglutaminase (tTG) has been identified   as the endomysial antigen.  Studies have demonstr-   ated that endomysial IgA antibodies have over 99%   specificity for gluten sensitive enteropathy.   • WBC 09/07/2023 7.57  3.40 - 10.80 10*3/mm3 Final   • RBC 09/07/2023 4.58  3.77 - 5.28 10*6/mm3 Final   • Hemoglobin 09/07/2023 8.3 (L)  12.0 - 15.9 g/dL Final   • Hematocrit 09/07/2023 31.5 (L)  34.0 - 46.6 % Final   • MCV 09/07/2023 68.8 (L)  79.0 - 97.0 fL Final   • MCH 09/07/2023 18.1 (L)  26.6 - 33.0 pg Final   • MCHC 09/07/2023 26.3 (L)  31.5 - 35.7 g/dL Final   • RDW 09/07/2023 18.5 (H)  12.3 - 15.4 % Final   • RDW-SD 09/07/2023 44.7  37.0 - 54.0 fl Final   • MPV 09/07/2023    Final    Unable to calculate.       • Platelets 09/07/2023 252  140 - 450 10*3/mm3 Final   • Neutrophil % 09/07/2023 67.0  42.7 - 76.0 % Final   • Lymphocyte % 09/07/2023 15.7 (L)  19.6 - 45.3 % Final   • Monocyte % 09/07/2023 8.9  5.0 - 12.0 % Final   • Eosinophil % 09/07/2023 6.2  0.3 - 6.2 % Final   • Basophil % 09/07/2023 1.7 (H)  0.0 - 1.5 % Final   • Immature Grans % 09/07/2023 0.5  0.0 - 0.5 % Final   • Neutrophils, Absolute 09/07/2023 5.07  1.70 - 7.00 10*3/mm3 Final   • Lymphocytes, Absolute 09/07/2023 1.19  0.70 - 3.10 10*3/mm3 Final   • Monocytes, Absolute 09/07/2023 0.67  0.10 - 0.90 10*3/mm3 Final   • Eosinophils, Absolute 09/07/2023 0.47 (H)  0.00 - 0.40 10*3/mm3 Final   • Basophils, Absolute 09/07/2023 0.13  0.00 - 0.20 10*3/mm3 Final   • Immature Grans, Absolute 09/07/2023 0.04  0.00 - 0.05 10*3/mm3 Final   • nRBC 09/07/2023 0.0  0.0 - 0.2 /100 WBC Final   • Anisocytosis 09/07/2023 Mod/2+  None Seen Final   • Hypochromia 09/07/2023 Mod/2+  None Seen Final   • Microcytes 09/07/2023 Large/3+  None Seen Final   • Platelet Morphology 09/07/2023 Normal  Normal Final   Telemedicine on 08/10/2023   Component Date Value Ref Range  Status   • External Amphetamine Screen Urine 08/10/2023 Negative   Final   • External Benzodiazepine Screen Uri* 08/10/2023 Negative   Final   • External Cocaine Screen Urine 08/10/2023 Negative   Final   • External THC Screen Urine 08/10/2023 Negative   Final   • External Methadone Screen Urine 08/10/2023 Negative   Final   • External Methamphetamine Screen Ur* 08/10/2023 Negative   Final   • External Oxycodone Screen Urine 08/10/2023 Negative   Final   • External Buprenorphine Screen Urine 08/10/2023 Positive (A)   Final   • External MDMA 08/10/2023 Negative   Final   • External Opiates Screen Urine 08/10/2023 Negative   Final   Telemedicine on 07/13/2023   Component Date Value Ref Range Status   • External Amphetamine Screen Urine 07/13/2023 Negative   Final   • External Benzodiazepine Screen Uri* 07/13/2023 Negative   Final   • External Cocaine Screen Urine 07/13/2023 Negative   Final   • External THC Screen Urine 07/13/2023 Negative   Final   • External Methadone Screen Urine 07/13/2023 Negative   Final   • External Methamphetamine Screen Ur* 07/13/2023 Negative   Final   • External Oxycodone Screen Urine 07/13/2023 Negative   Final   • External Buprenorphine Screen Urine 07/13/2023 Negative   Final   • External MDMA 07/13/2023 Negative   Final   • External Opiates Screen Urine 07/13/2023 Negative   Final   Office Visit on 06/15/2023   Component Date Value Ref Range Status   • External Amphetamine Screen Urine 06/15/2023 Negative   Final   • External Benzodiazepine Screen Uri* 06/15/2023 Negative   Final   • External Cocaine Screen Urine 06/15/2023 Negative   Final   • External THC Screen Urine 06/15/2023 Negative   Final   • External Methadone Screen Urine 06/15/2023 Negative   Final   • External Methamphetamine Screen Ur* 06/15/2023 Negative   Final   • External Oxycodone Screen Urine 06/15/2023 Negative   Final   • External Buprenorphine Screen Urine 06/15/2023 Positive (A)   Final   • External MDMA  06/15/2023 Negative   Final   • External Opiates Screen Urine 06/15/2023 Negative   Final   Lab on 05/24/2023   Component Date Value Ref Range Status   • Ferritin 05/24/2023 3.40 (L)  13.00 - 150.00 ng/mL Final   • Iron 05/24/2023 14 (L)  37 - 145 mcg/dL Final   • Iron Saturation (TSAT) 05/24/2023 2 (L)  20 - 50 % Final   • Transferrin 05/24/2023 383 (H)  200 - 360 mg/dL Final   • TIBC 05/24/2023 571 (H)  298 - 536 mcg/dL Final         Assessment & Plan   Diagnoses and all orders for this visit:    1. Opioid type dependence, continuous (Primary)  -     buprenorphine-naloxone (SUBOXONE) 8-2 MG per SL tablet; Place 2 tablets under the tongue Daily.  Dispense: 56 tablet; Refill: 0    2. Medication management  -     [facility] Drug Screen    3. Generalized anxiety disorder  -     FLUoxetine (PROzac) 20 MG capsule; Take 1 capsule by mouth Daily.  Dispense: 30 capsule; Refill: 0    4. Attention deficit hyperactivity disorder (ADHD), predominantly inattentive type  -     atomoxetine (Strattera) 40 MG capsule; Take 1 capsule by mouth Daily.  Dispense: 30 capsule; Refill: 0    5. Impaired concentration  -     atomoxetine (Strattera) 40 MG capsule; Take 1 capsule by mouth Daily.  Dispense: 30 capsule; Refill: 0        Visit Diagnoses:  No diagnosis found.    PLAN:  Safety: No acute safety concerns  Risk Assessment: Risk of self-harm acutely is low. Risk of self-harm chronically is also low, but could be further elevated in the event of treatment noncompliance and/or AODA.    TREATMENT PLAN/GOALS: Continue supportive psychotherapy efforts and medications as indicated. Treatment and medication options discussed during today's visit. Patient acknowledged and verbally consented to continue with current treatment plan and was educated on the importance of compliance with treatment and follow-up appointments.    MEDICATION ISSUES:  DONG reviewed as expected.  Discussed medication options and treatment plan of prescribed medication  as well as the risks, benefits, and side effects including potential falls, possible impaired driving and metabolic adversities among others. Patient is agreeable to call the office with any worsening of symptoms or onset of side effects. Patient is agreeable to call 911 or go to the nearest ER should he/she begin having SI/HI. No medication side effects or related complaints today.     MEDS ORDERED DURING VISIT:  No orders of the defined types were placed in this encounter.      No follow-ups on file.           This document has been electronically signed by ALMA Garcia  October 4, 2023 16:21 EDT      Part of this note may be an electronic transcription/translation of spoken language to printed text using the Dragon Dictation System.

## 2023-10-19 ENCOUNTER — LAB (OUTPATIENT)
Dept: ONCOLOGY | Facility: CLINIC | Age: 33
End: 2023-10-19
Payer: COMMERCIAL

## 2023-10-19 ENCOUNTER — OFFICE VISIT (OUTPATIENT)
Dept: ONCOLOGY | Facility: CLINIC | Age: 33
End: 2023-10-19
Payer: COMMERCIAL

## 2023-10-19 VITALS
SYSTOLIC BLOOD PRESSURE: 107 MMHG | WEIGHT: 150.6 LBS | RESPIRATION RATE: 20 BRPM | HEART RATE: 87 BPM | DIASTOLIC BLOOD PRESSURE: 69 MMHG | TEMPERATURE: 97.3 F | BODY MASS INDEX: 25.06 KG/M2 | OXYGEN SATURATION: 100 %

## 2023-10-19 DIAGNOSIS — K90.9 MALABSORPTION OF IRON: ICD-10-CM

## 2023-10-19 DIAGNOSIS — D50.9 IRON DEFICIENCY ANEMIA, UNSPECIFIED IRON DEFICIENCY ANEMIA TYPE: ICD-10-CM

## 2023-10-19 DIAGNOSIS — D50.9 IRON DEFICIENCY ANEMIA, UNSPECIFIED IRON DEFICIENCY ANEMIA TYPE: Primary | ICD-10-CM

## 2023-10-19 LAB
ALBUMIN SERPL-MCNC: 4.3 G/DL (ref 3.5–5.2)
ALBUMIN/GLOB SERPL: 1.7 G/DL
ALP SERPL-CCNC: 78 U/L (ref 39–117)
ALT SERPL W P-5'-P-CCNC: 10 U/L (ref 1–33)
ANION GAP SERPL CALCULATED.3IONS-SCNC: 11.2 MMOL/L (ref 5–15)
AST SERPL-CCNC: 16 U/L (ref 1–32)
BASOPHILS # BLD AUTO: 0.09 10*3/MM3 (ref 0–0.2)
BASOPHILS NFR BLD AUTO: 1.5 % (ref 0–1.5)
BILIRUB SERPL-MCNC: 0.2 MG/DL (ref 0–1.2)
BUN SERPL-MCNC: 11 MG/DL (ref 6–20)
BUN/CREAT SERPL: 8.3 (ref 7–25)
CALCIUM SPEC-SCNC: 9.4 MG/DL (ref 8.6–10.5)
CHLORIDE SERPL-SCNC: 107 MMOL/L (ref 98–107)
CO2 SERPL-SCNC: 21.8 MMOL/L (ref 22–29)
CREAT SERPL-MCNC: 1.32 MG/DL (ref 0.57–1)
DEPRECATED RDW RBC AUTO: ABNORMAL FL
EGFRCR SERPLBLD CKD-EPI 2021: 55.5 ML/MIN/1.73
EOSINOPHIL # BLD AUTO: 0.59 10*3/MM3 (ref 0–0.4)
EOSINOPHIL NFR BLD AUTO: 9.9 % (ref 0.3–6.2)
ERYTHROCYTE [DISTWIDTH] IN BLOOD BY AUTOMATED COUNT: ABNORMAL %
FERRITIN SERPL-MCNC: 50.49 NG/ML (ref 13–150)
GLOBULIN UR ELPH-MCNC: 2.5 GM/DL
GLUCOSE SERPL-MCNC: 80 MG/DL (ref 65–99)
HCT VFR BLD AUTO: 41.1 % (ref 34–46.6)
HGB BLD-MCNC: 12.4 G/DL (ref 12–15.9)
IMM GRANULOCYTES # BLD AUTO: 0.04 10*3/MM3 (ref 0–0.05)
IMM GRANULOCYTES NFR BLD AUTO: 0.7 % (ref 0–0.5)
IRON 24H UR-MRATE: 85 MCG/DL (ref 37–145)
IRON SATN MFR SERPL: 22 % (ref 20–50)
LYMPHOCYTES # BLD AUTO: 1.22 10*3/MM3 (ref 0.7–3.1)
LYMPHOCYTES NFR BLD AUTO: 20.5 % (ref 19.6–45.3)
MCH RBC QN AUTO: 24.3 PG (ref 26.6–33)
MCHC RBC AUTO-ENTMCNC: 30.2 G/DL (ref 31.5–35.7)
MCV RBC AUTO: 80.4 FL (ref 79–97)
MONOCYTES # BLD AUTO: 0.6 10*3/MM3 (ref 0.1–0.9)
MONOCYTES NFR BLD AUTO: 10.1 % (ref 5–12)
NEUTROPHILS NFR BLD AUTO: 3.41 10*3/MM3 (ref 1.7–7)
NEUTROPHILS NFR BLD AUTO: 57.3 % (ref 42.7–76)
NRBC BLD AUTO-RTO: 0 /100 WBC (ref 0–0.2)
PLATELET # BLD AUTO: 188 10*3/MM3 (ref 140–450)
PMV BLD AUTO: 10.6 FL (ref 6–12)
POTASSIUM SERPL-SCNC: 4.1 MMOL/L (ref 3.5–5.2)
PROT SERPL-MCNC: 6.8 G/DL (ref 6–8.5)
RBC # BLD AUTO: 5.11 10*6/MM3 (ref 3.77–5.28)
SODIUM SERPL-SCNC: 140 MMOL/L (ref 136–145)
TIBC SERPL-MCNC: 380 MCG/DL (ref 298–536)
TRANSFERRIN SERPL-MCNC: 255 MG/DL (ref 200–360)
WBC NRBC COR # BLD: 5.95 10*3/MM3 (ref 3.4–10.8)

## 2023-10-19 PROCEDURE — 82728 ASSAY OF FERRITIN: CPT

## 2023-10-19 PROCEDURE — 84466 ASSAY OF TRANSFERRIN: CPT

## 2023-10-19 PROCEDURE — 85025 COMPLETE CBC W/AUTO DIFF WBC: CPT

## 2023-10-19 PROCEDURE — 83540 ASSAY OF IRON: CPT

## 2023-10-19 PROCEDURE — 80053 COMPREHEN METABOLIC PANEL: CPT

## 2023-10-19 NOTE — PROGRESS NOTES
"DATE:  10/19/2023    DIAGNOSIS: Iron deficiency anemia    CHIEF COMPLAINT:  Fatigue, shortness of breath w/ exertion, generalized weakness, occasional dizziness      TREATMENT HISTORY:  Venofer 300 mg IV  D1 09/18/2023  D2 09/21/2023 D3 09/27/2023      HISTORY OF PRESENT ILLNESS:   Julieta Beasley is a very pleasant 33 y.o. female who is being seen today at the request of ALMA Farrell for evaluation and treatment of iron deficiency anemia.  Ms. Beasley reports following with her PCP monthly with blood testing every 2 months.  She says she has been struggling with TERA since \"basically birth\".  She has been on oral iron in the past but was unable to tolerate this due to extreme stomach upset.  She is also had IV Venofer 200 mg x 1 dose around 3 to 4 months ago.  At present, she reports extreme weakness and fatigue, occasional dizziness, and shortness of breath with exertion that has gotten worse within the last 3 months.  She denies any aches and pains.  No reports of obvious blood loss from any source.  She is on the Depo shot therefore she does not have any periods, but reports normal regular flow periods prior to starting the shot.  She states her appetite is \"not great\", she only eats around 1x per day, she has lost about 20 pounds over the last 7 months.  She reports having an EGD and colonoscopy done around 1 month ago at [place], we will try to get these records.  No known kidney disease.  Otherwise no specific complaints today.  Her most recent CBC done on 8/14/2023 showed a hemoglobin of 8.5 and hematocrit of 31.1.  WBCs and platelets were both normal.  Iron panel/ferritin revealed iron of 17, iron sat of 4%, and ferritin of 7.1.     Interval History:  Ms. Beasley presents today for follow up of iron deficiency anemia. Since her last visit, she states that her shortness of breath w/ exertion has improved. She is no longer having occasional dizziness. She states that fatigue is unchanged. She continues " to take Depo-Provera as prescribed. She denies obvious bleeding from any source. She is otherwise without specific complaints.       The following portions of the patient's history were reviewed and updated as appropriate: allergies, current medications, past family history, past medical history, past social history, past surgical history and problem list.    PAST MEDICAL HISTORY:  Past Medical History:   Diagnosis Date    Acid reflux     Anemia     Anxiety     Substance abuse        PAST SURGICAL HISTORY:  Past Surgical History:   Procedure Laterality Date    APPENDECTOMY       SECTION      ENDOSCOPY N/A 10/28/2022    Procedure: ESOPHAGOGASTRODUODENOSCOPY;  Surgeon: Jayro Bar MD;  Location: [facility];  Service: Gastroenterology;  Laterality: N/A;       SOCIAL HISTORY:  Social History     Socioeconomic History    Marital status: Single   Tobacco Use    Smoking status: Every Day     Packs/day: 1.00     Years: 7.00     Additional pack years: 0.00     Total pack years: 7.00     Types: Cigarettes     Passive exposure: Current    Smokeless tobacco: Never   Vaping Use    Vaping Use: Never used   Substance and Sexual Activity    Alcohol use: Not Currently    Drug use: Not Currently     Types: Methamphetamines     Comment: per patient has been clean for about 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    Sexual activity: Defer     Birth control/protection: Depo-provera       FAMILY HISTORY:  Family History   Problem Relation Age of Onset    Heart disease Mother     Heart disease Father     Cancer Maternal Grandmother     Cancer Maternal Grandfather        MEDICATIONS:  The current medication list was reviewed in the EMR    Current Outpatient Medications:     albuterol sulfate  (90 Base) MCG/ACT inhaler, , Disp: , Rfl:     atomoxetine (Strattera) 40 MG capsule, Take 1 capsule by mouth Daily., Disp: 30 capsule, Rfl: 0    buprenorphine-naloxone (SUBOXONE) 8-2 MG per SL tablet, Place 2 tablets under the tongue Daily., Disp: 56  tablet, Rfl: 0    cloNIDine (CATAPRES) 0.1 MG tablet, TAKE 1 TABLET BY MOUTH 2 TIMES DAILY AS NEEDED, Disp: 60 tablet, Rfl: 0    estradiol cypionate (DEPO-ESTRADIOL) 5 MG/ML injection, Inject  into the appropriate muscle as directed by prescriber Every 28 (Twenty-Eight) Days., Disp: , Rfl:     FLUoxetine (PROzac) 20 MG capsule, Take 1 capsule by mouth Daily., Disp: 30 capsule, Rfl: 0    ibuprofen (ADVIL,MOTRIN) 200 MG tablet, Take 4 tablets by mouth Every 12 (Twelve) Hours., Disp: , Rfl:     metroNIDAZOLE (FLAGYL) 500 MG tablet, , Disp: , Rfl:     naloxone (NARCAN) 4 MG/0.1ML nasal spray, 1 spray into the nostril(s) as directed by provider As Needed (opiate over sedation). 1 spray in 1 nostril every 2 to 3 minutes call 911, Disp: 2 each, Rfl: 2    Neurontin 600 MG tablet, Take 1 tablet by mouth 3 (Three) Times a Day., Disp: , Rfl:     pantoprazole (PROTONIX) 40 MG EC tablet, Take 1 tablet by mouth Daily., Disp: , Rfl:     valACYclovir (VALTREX) 1000 MG tablet, , Disp: , Rfl:     ALLERGIES:    Allergies   Allergen Reactions    Diphenhydramine     Sulfamethoxazole-Trimethoprim          REVIEW OF SYSTEMS:    A comprehensive 14 point review of systems was performed.  Significant findings as mentioned above.  All other systems reviewed and are negative.        Physical Exam   Vital Signs:   Vitals:    10/19/23 1541   BP: 107/69   Pulse: 87   Resp: 20   Temp: 97.3 °F (36.3 °C)   SpO2: 100%       ECOG score: 0             General: Well developed, well nourished, alert and oriented x 3, in no acute distress.   Head: ATNC   Eyes: PERRL, No evidence of conjunctivitis.   Neck: Neck supple. No thyromegaly. No JVD.   Lungs: Clear in all fields to A&P without rales, rhonchi or wheezing.   Heart: S1, S2. Regular rate and rhythm. No murmurs, rubs, or gallops.   Abdomen: Soft. Bowel sounds are normoactive. Nontender with palpation. No Hepatosplenomegaly can be appreciated.   Extremities: No cyanosis or edema. Peripheral pulses  palpable and +2 bilaterally.   Integumentary: No rash, sores, erythema or nodules. No blistering, bruising, or dry skin.   Lymph Nodes: No palpable cervical, submandibular, supraclavicular, axillary or inguinal lymphadenopathy noted. No petechiae, purpura or ecchymosis noted.   Neurologic: Alert, awake and oriented x 3. Motor strength 5/5 in all four extremities. Cranial nerves 2-12 grossly intact. No sensory deficit.    Pain Score:  Pain Score    10/19/23 1541   PainSc: 0-No pain       PHQ-Score Total:  PHQ-9 Total Score:         PATHOLOGY:         ENDOSCOPY:  10/28/2022 EGD    ESOPHAGOGASTRODUODENOSCOPY  Procedure Note     Julieta HEARD Beasley  10/28/2022     Pre-op Diagnosis:   Esophageal dysphagia [R13.19]     Post-op Diagnosis:  Esophageal candida,         Procedure(s):  ESOPHAGOGASTRODUODENOSCOPY     Surgeon(s):  Jayro Bar MD     Anesthesia: General     Staff:   Circulator: Claire Foster RN  Endo Technician: Madison Ardon     Estimated Blood Loss: none     Specimens:                Order Name Source Comment Collection Info Order Time   TISSUE EXAM, [facility] ([facility], [facility]) Esophagus, Distal   Collected By: Jayro Bar MD 10/28/2022 10:44 AM     How many specimens?   1            Release to patient   Routine Release                 Drains: * No LDAs found *     Procedure: The scope passed from the mouth to the duodenum. The duodenum and stomach were unremarkable. There was candida esophagitis from the GE junction up to the pharynx. A biopsy was done, and a guide wire left in place. The 51 Savory dilator was passed and went down easily. The dilator and wire were removed.      Findings: candida esophagitis     Complications: none   Grafts / Implants N/A     Jayro Bar MD      Date: 10/28/2022  Time: 10:57 EDT         IMAGING:      LABS:  Previous Labs:   CBC Auto Differential                   Component  Ref Range & Units 10 mo ago  (10/24/22) 7 yr ago  (6/21/16) 9 yr ago  (9/3/14) 9 yr  ago  (7/7/14) 9 yr ago  (4/10/14)   WBC  3.40 - 10.80 10*3/mm3 6.81 8.67 R 14.4 High  R 8.9 R     RBC  3.77 - 5.28 10*6/mm3 4.74 4.81 R 4.71 R 4.46 R     Hemoglobin  12.0 - 15.9 g/dL 9.4 Low  14.9 R 14.0 R 13.8 R     Hematocrit  34.0 - 46.6 % 33.5 Low  44.5 R 43.1 R 41.3 R     MCV  79.0 - 97.0 fL 70.7 Low  92.5 R 91.5 R 92.6 R     MCH  26.6 - 33.0 pg 19.8 Low  31.0 R 29.7 R 30.9 R     MCHC  31.5 - 35.7 g/dL 28.1 Low  33.5 R 32.5 Low  R 33.4 R     RDW  12.3 - 15.4 % 15.5 High  11.9 R 12.8 R 12.9 R     RDW-SD  37.0 - 54.0 fl 39.0 39.8         MPV  6.0 - 12.0 fL 12.0 12.5 High  R 11.6 High  R 12.2 High  R     Platelets  140 - 450 10*3/mm3 254 217 R 215 R 187 R 201 R   Neutrophil %  42.7 - 76.0 % 66.2 71.5 High  R 75.9 High  R 55.0 R     Lymphocyte %  19.6 - 45.3 % 18.8 Low  19.5 Low  R 11.4 Low  R 30.6 R     Monocyte %  5.0 - 12.0 % 6.2 5.2 R 8.8 R 10.0 R     Eosinophil %  0.3 - 6.2 % 7.2 High  3.2 R 3.3 R 3.4 R     Basophil %  0.0 - 1.5 % 1.3 0.6 R 0.4 R 0.8 R     Immature Grans %  0.0 - 0.5 % 0.3 0.0 0.20 R 0.20 R     Neutrophils, Absolute  1.70 - 7.00 10*3/mm3 4.51 6.20 R 10.9 High  R 4.9 R     Lymphocytes, Absolute  0.70 - 3.10 10*3/mm3 1.28 1.69 R 1.6 R 2.7 R     Monocytes, Absolute  0.10 - 0.90 10*3/mm3 0.42 0.45 1.3 High  R 0.9 R     Eosinophils, Absolute  0.00 - 0.40 10*3/mm3 0.49 High  0.28 R 0.5 R 0.3 R     Basophils, Absolute  0.00 - 0.20 10*3/mm3 0.09 0.05 R 0.1 R 0.1 R     Immature Grans, Absolute  0.00 - 0.05 10*3/mm3 0.02 0.00 R         nRBC  0.0 - 0.2 /100 WBC 0.0                                        Initial Workup -  09/07/2023  Component      Latest Ref Rng 9/7/2023   WBC      3.40 - 10.80 10*3/mm3 7.57    RBC      3.77 - 5.28 10*6/mm3 4.58    Hemoglobin      12.0 - 15.9 g/dL 8.3 (L)    Hematocrit      34.0 - 46.6 % 31.5 (L)    MCV      79.0 - 97.0 fL 68.8 (L)    MCH      26.6 - 33.0 pg 18.1 (L)    MCHC      31.5 - 35.7 g/dL 26.3 (L)    RDW      12.3 - 15.4 % 18.5 (H)    RDW-SD      37.0 - 54.0 fl  44.7    MPV --    Platelets      140 - 450 10*3/mm3 252    Neutrophil Rel %      42.7 - 76.0 % 67.0    Lymphocyte Rel %      19.6 - 45.3 % 15.7 (L)    Monocyte Rel %      5.0 - 12.0 % 8.9    Eosinophil Rel %      0.3 - 6.2 % 6.2    Basophil Rel %      0.0 - 1.5 % 1.7 (H)    Immature Granulocyte Rel %      0.0 - 0.5 % 0.5    Neutrophils Absolute      1.70 - 7.00 10*3/mm3 5.07    Lymphocytes Absolute      0.70 - 3.10 10*3/mm3 1.19    Monocytes Absolute      0.10 - 0.90 10*3/mm3 0.67    Eosinophils Absolute      0.00 - 0.40 10*3/mm3 0.47 (H)    Basophils Absolute      0.00 - 0.20 10*3/mm3 0.13    Immature Grans, Absolute      0.00 - 0.05 10*3/mm3 0.04    nRBC      0.0 - 0.2 /100 WBC 0.0       Component      Latest Ref Rn 9/7/2023   Iron      37 - 145 mcg/dL 12 (L)    Iron Saturation (TSAT)      20 - 50 % 2 (L)    Transferrin      200 - 360 mg/dL 336    TIBC      298 - 536 mcg/dL 501       Component      Latest Ref [facility] 9/7/2023   Ferritin      13.00 - 150.00 ng/mL 6.38 (L)       Lab Results   Component Value Date    COPPER 148 09/07/2023     Lab Results   Component Value Date    ZINC 102 09/07/2023     Lab Results   Component Value Date    TSH 1.600 09/07/2023    T3UP 42.2 (H) 04/10/2014     Lab Results   Component Value Date    CREHXERT10 450 09/07/2023    FOLATE 11.10 09/07/2023     Lab Results   Component Value Date    RETICCTPCT 1.37 09/07/2023     Lab Results   Component Value Date    HAPTOGLOBIN 189 09/07/2023     Lab Results   Component Value Date     09/07/2023     Lab Results   Component Value Date    CRP <0.30 09/07/2023     Lab Results   Component Value Date    SEDRATE 5 09/07/2023     Lab Results   Component Value Date    TTRANSGLIGA <2 09/07/2023           RECENT LABS:  Lab Results   Component Value Date    WBC 7.57 09/07/2023    HGB 8.3 (L) 09/07/2023    HCT 31.5 (L) 09/07/2023    MCV 68.8 (L) 09/07/2023    RDW 18.5 (H) 09/07/2023     09/07/2023    NEUTRORELPCT 67.0 09/07/2023     "LYMPHORELPCT 15.7 (L) 09/07/2023    MONORELPCT 8.9 09/07/2023    EOSRELPCT 6.2 09/07/2023    BASORELPCT 1.7 (H) 09/07/2023    NEUTROABS 5.07 09/07/2023    LYMPHSABS 1.19 09/07/2023       Lab Results   Component Value Date     09/07/2023    K 4.3 09/07/2023    CO2 21.9 (L) 09/07/2023     09/07/2023    BUN 9 09/07/2023    CREATININE 0.91 09/07/2023    EGFRIFNONA 68 06/21/2016    GLUCOSE 78 09/07/2023    CALCIUM 9.3 09/07/2023    ALKPHOS 78 09/07/2023    AST 18 09/07/2023    ALT 13 09/07/2023    BILITOT <0.2 09/07/2023    ALBUMIN 4.6 09/07/2023    PROTEINTOT 7.4 09/07/2023       Lab Results   Component Value Date    FERRITIN 6.38 (L) 09/07/2023    IRON 12 (L) 09/07/2023    TIBC 501 09/07/2023    LABIRON 2 (L) 09/07/2023    XUEUPRCG62 450 09/07/2023    FOLATE 11.10 09/07/2023    HAPTOGLOBIN 189 09/07/2023    RETICCTPCT 1.37 09/07/2023    RETIC 0.0623 09/07/2023        .  ASSESSMENT & PLAN:  Julieta Beasley is a very pleasant 33 y.o. female with    TERA   - Previous labs were reviewed.  CBCs available from 2016 and 2014 showed a normal hemoglobin and hematocrit.  However around October 2022, which is the next available lab work I have to review, shows a decreased H/H at 9.4 and 33.5.  On 5/10/2023 hemoglobin was 8.3 with hematocrit of 34.3; on 5/24/2023 her iron panel showed a serum iron of 14 and iron saturation of 2%, with a ferritin of 3.4; on 8/14/2023, her most recent lab work showed iron of 17 with iron sat of 4%, and a ferritin of 7.1, H&H showed 8.5 and 31.1.  WBCs and platelets are always normal.  - She has been on oral iron in the past but was unable to tolerate this due to extreme stomach upset.  She is also had IV Venofer 200 mg x 1 dose around 3 to 4 months ago.    - At present, she reports extreme weakness and fatigue, occasional dizziness, and shortness of breath with exertion that has gotten worse within the last 3 months. She states her appetite is \"not great\", she only eats around 1x per day, " she has lost about 20 pounds over the last 7 months.   - She denies any aches and pains.  No reports of obvious blood loss from any source.  She is on the Depo shot therefore she does not have any periods, but reports normal regular flow periods prior to starting the shot.  No known kidney disease.  - EGD and colonoscopy done around 1 month ago at [place]. Will attempt to obtain records.  - Additional labs to further evaluate anemia including CMP, copper, zinc, B12, folate, TSH, peripheral blood smear, reticulocytes, haptoglobin, ESR, CRP, LDH and tissue transglutaminase were without cause for concern. She was found to be iron deficient and treated with IV iron given her malabsorption of iron. She received IV Venofer as above.  - CBC today with WBC 5.95, Hg 12.4, Hct 41.1, platelets 188,000.  - Iron profile with normal iron and iron saturation with normal TIBC and improved ferritin.   - Follow up in 2 months with repeat CBCD, iron profile and ferritin. She is aware for worsening of symptoms or if any new problems arise she should notify our office and I would be happy to see her sooner.        ACO / RICHAR/Other  Quality measures  -  Julietaalex Beasley did not receive 2023 flu vaccine.  This was recommended but declined.  -  Julieta Beasley reports a pain score of 0.    -  Current outpatient and discharge medications have been reconciled for the patient.  Reviewed by: ALMA Cooper        I spent minutes 30 with Julieta Beasley today.  In the office today, more than 50% of this time was spent face-to-face with her  in counseling / coordination of care, reviewing her interim medical history and counseling on the current treatment plan.  All questions were answered to her satisfaction.                  Electronically Signed by: ALMA Saxena , ALMA October 19, 2023 15:42 EDT       CC:   No ref. provider found  Mitchell Kennedy DO

## 2023-10-19 NOTE — PROGRESS NOTES
Venipuncture Blood Specimen Collection  Venipuncture performed in right hand by Loli Barros MA with good hemostasis. Patient tolerated the procedure well without complications.   10/19/23   Loli Barros MA

## 2023-10-31 DIAGNOSIS — F90.0 ATTENTION DEFICIT HYPERACTIVITY DISORDER (ADHD), PREDOMINANTLY INATTENTIVE TYPE: ICD-10-CM

## 2023-10-31 DIAGNOSIS — R41.840 IMPAIRED CONCENTRATION: ICD-10-CM

## 2023-10-31 DIAGNOSIS — F41.1 GENERALIZED ANXIETY DISORDER: ICD-10-CM

## 2023-10-31 RX ORDER — FLUOXETINE HYDROCHLORIDE 20 MG/1
20 CAPSULE ORAL DAILY
Qty: 30 CAPSULE | Refills: 0 | Status: SHIPPED | OUTPATIENT
Start: 2023-10-31 | End: 2023-11-01 | Stop reason: SDUPTHER

## 2023-10-31 RX ORDER — ATOMOXETINE 40 MG/1
40 CAPSULE ORAL DAILY
Qty: 30 CAPSULE | Refills: 0 | Status: SHIPPED | OUTPATIENT
Start: 2023-10-31 | End: 2023-11-01

## 2023-11-01 ENCOUNTER — TELEMEDICINE (OUTPATIENT)
Dept: PSYCHIATRY | Facility: CLINIC | Age: 33
End: 2023-11-01
Payer: COMMERCIAL

## 2023-11-01 VITALS
WEIGHT: 150 LBS | BODY MASS INDEX: 24.99 KG/M2 | HEART RATE: 101 BPM | DIASTOLIC BLOOD PRESSURE: 68 MMHG | HEIGHT: 65 IN | SYSTOLIC BLOOD PRESSURE: 130 MMHG

## 2023-11-01 DIAGNOSIS — F41.1 GENERALIZED ANXIETY DISORDER: ICD-10-CM

## 2023-11-01 DIAGNOSIS — R41.840 IMPAIRED CONCENTRATION: ICD-10-CM

## 2023-11-01 DIAGNOSIS — F11.20 OPIOID TYPE DEPENDENCE, CONTINUOUS: Primary | ICD-10-CM

## 2023-11-01 DIAGNOSIS — F90.0 ATTENTION DEFICIT HYPERACTIVITY DISORDER (ADHD), PREDOMINANTLY INATTENTIVE TYPE: ICD-10-CM

## 2023-11-01 DIAGNOSIS — Z79.899 MEDICATION MANAGEMENT: ICD-10-CM

## 2023-11-01 RX ORDER — HYDROXYZINE PAMOATE 25 MG/1
1 CAPSULE ORAL EVERY 12 HOURS SCHEDULED
COMMUNITY
Start: 2023-10-09

## 2023-11-01 RX ORDER — BUPRENORPHINE HYDROCHLORIDE AND NALOXONE HYDROCHLORIDE DIHYDRATE 8; 2 MG/1; MG/1
2 TABLET SUBLINGUAL DAILY
Qty: 56 TABLET | Refills: 0 | Status: SHIPPED | OUTPATIENT
Start: 2023-11-01

## 2023-11-01 RX ORDER — CLONIDINE HYDROCHLORIDE 0.1 MG/1
0.1 TABLET ORAL 2 TIMES DAILY PRN
Qty: 60 TABLET | Refills: 0 | Status: SHIPPED | OUTPATIENT
Start: 2023-11-01

## 2023-11-01 RX ORDER — ATOMOXETINE 40 MG/1
40 CAPSULE ORAL DAILY
Qty: 30 CAPSULE | Refills: 0 | Status: SHIPPED | OUTPATIENT
Start: 2023-11-01 | End: 2024-10-31

## 2023-11-01 RX ORDER — FLUOXETINE HYDROCHLORIDE 20 MG/1
20 CAPSULE ORAL DAILY
Qty: 30 CAPSULE | Refills: 0 | Status: SHIPPED | OUTPATIENT
Start: 2023-11-01 | End: 2024-10-31

## 2023-11-01 NOTE — PROGRESS NOTES
This provider is located at [facility]. The Patient is seen remotely located at the [facility] ([facility]) using Video. Patient is being seen via telehealth and confirm that they are in a secure environment for this session. The patient's condition being diagnosed/treated is appropriate for telemedicine. Provider identified as Xu Saeed as well as credentials APRN MSN FNP-C PUMA-JULISSA.   The client/patient gave consent to be seen remotely, and when consent is given they understand that the consent allows for patient identifiable information to be sent to a third party as needed.   They may refuse to be seen remotely at any time. The electronic data is encrypted and password protected, and the patient has been advised of the potential risks to privacy not withstanding such measures.    Chief Complaint/History of Present Illness: Follow Up buprenorphine/naloxone Medicated Assisted Treatment for Opiate Use Disorder     Patient/Client Concerns/Updates: Continue Prozac and clonidine for anxiety, Strattera for difficulty concentrating    -Patient reports she took Strattera only 1 day this past month; advised patient this medication was to be taken consistently to not only assess therapeutic efficacy and also assess tolerance; patient expresses understanding  -Fluctuating anxious symptoms associated with life stress reports manageable and tolerable patient reports he is able to cope adequately  -Denies depressive episodes or exacerbations, no suicidal or homicidal thoughts  -No concerns with sleep    Triggers (Persons/Places/Things/Events/Thought/Emotions): Difficulty concentrating and anxiety    Cravings/Substance Use: Denies cravings or use of illicit substances    Relapse Prevention: Counseling    Urine Drug Screen (today's visit) discussed: Positive buprenorphine, otherwise negative for substances tested    UDS Confirmation (Most recent/Resulted): Positive buprenorphine/nor-buprenorphine, no  concerns for urine tampering otherwise positive gabapentin    Most recent pertinent laboratory studies reviewed: 10/24/2022-hepatic function within normal limits, Cr WNL         DONG (PDMP) Reviewed for Current/Active Medications: buprenorphine/naloxone and gabapentin as reviewed today    Past Surgical History:  Past Surgical History:   Procedure Laterality Date   • APPENDECTOMY     •  SECTION     • ENDOSCOPY N/A 10/28/2022    Procedure: ESOPHAGOGASTRODUODENOSCOPY;  Surgeon: Jayro Bar MD;  Location: [facility];  Service: Gastroenterology;  Laterality: N/A;       Problem List:  Patient Active Problem List   Diagnosis   • Esophageal dysphagia   • Iron deficiency anemia, unspecified   • Malabsorption of iron       Allergy:   Allergies   Allergen Reactions   • Atomoxetine Other (See Comments)     Skin crawling-per patient   • Diphenhydramine    • Sulfamethoxazole-Trimethoprim         Current Medications:   Current Outpatient Medications   Medication Sig Dispense Refill   • albuterol sulfate  (90 Base) MCG/ACT inhaler      • atomoxetine (STRATTERA) 40 MG capsule Take 1 capsule by mouth Daily. 30 capsule 0   • buprenorphine-naloxone (SUBOXONE) 8-2 MG per SL tablet Place 2 tablets under the tongue Daily. 56 tablet 0   • cloNIDine (CATAPRES) 0.1 MG tablet Take 1 tablet by mouth 2 (Two) Times a Day As Needed (anxiety, panic attacks). 60 tablet 0   • estradiol cypionate (DEPO-ESTRADIOL) 5 MG/ML injection Inject  into the appropriate muscle as directed by prescriber Every 28 (Twenty-Eight) Days.     • FLUoxetine (PROzac) 20 MG capsule Take 1 capsule by mouth Daily. 30 capsule 0   • hydrOXYzine pamoate (VISTARIL) 25 MG capsule Take 1 capsule by mouth Every 12 (Twelve) Hours.     • ibuprofen (ADVIL,MOTRIN) 200 MG tablet Take 4 tablets by mouth Every 12 (Twelve) Hours.     • metroNIDAZOLE (FLAGYL) 500 MG tablet      • Neurontin 600 MG tablet Take 1 tablet by mouth 3 (Three) Times a Day.     • pantoprazole  (PROTONIX) 40 MG EC tablet Take 1 tablet by mouth Daily.     • valACYclovir (VALTREX) 1000 MG tablet      • naloxone (NARCAN) 4 MG/0.1ML nasal spray 1 spray into the nostril(s) as directed by provider As Needed (opiate over sedation). 1 spray in 1 nostril every 2 to 3 minutes call 911 (Patient not taking: Reported on 11/1/2023) 2 each 2     No current facility-administered medications for this visit.       Past Medical History:  Past Medical History:   Diagnosis Date   • Acid reflux    • Anemia    • Anxiety    • Substance abuse          Social History     Socioeconomic History   • Marital status: Single   Tobacco Use   • Smoking status: Every Day     Packs/day: 1.00     Years: 7.00     Additional pack years: 0.00     Total pack years: 7.00     Types: Cigarettes     Passive exposure: Current   • Smokeless tobacco: Never   Vaping Use   • Vaping Use: Never used   Substance and Sexual Activity   • Alcohol use: Not Currently   • Drug use: Not Currently     Types: Methamphetamines     Comment: per patient has been clean for about 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   • Sexual activity: Defer     Birth control/protection: Depo-provera         Family History   Problem Relation Age of Onset   • Heart disease Mother    • Heart disease Father    • Cancer Maternal Grandmother    • Cancer Maternal Grandfather          Mental Status Exam:   Hygiene:   good  Cooperation:  Cooperative  Eye Contact:  Good  Psychomotor Behavior:  Appropriate  Affect:  Appropriate  Mood: normal  Speech:  Normal  Thought Process:  Goal directed  Thought Content:  Normal  Suicidal:  None  Homicidal:  None  Hallucinations:  None  Delusion:  None  Memory:  Intact  Orientation:  Grossly intact  Reliability:  good  Insight:  Good  Judgement:  Good  Impulse Control:  Good         Review of Systems:  Review of Systems   Constitutional:  Negative for activity change, chills, diaphoresis and fatigue.   Respiratory:  Negative for apnea, cough and shortness of breath.   "  Cardiovascular:  Negative for chest pain, palpitations and leg swelling.   Gastrointestinal:  Negative for abdominal pain, constipation, diarrhea, nausea and vomiting.   Genitourinary:  Negative for difficulty urinating.   Musculoskeletal:  Negative for arthralgias.   Skin:  Negative for rash.   Neurological:  Negative for dizziness, weakness and headaches.   Psychiatric/Behavioral:  Positive for decreased concentration. Negative for agitation, self-injury, sleep disturbance and suicidal ideas. The patient is nervous/anxious.        Physical Exam:  Physical Exam  Vitals reviewed.   Constitutional:       General: She is not in acute distress.     Appearance: Normal appearance. She is not ill-appearing or toxic-appearing.   Pulmonary:      Effort: Pulmonary effort is normal.   Musculoskeletal:         General: Normal range of motion.   Neurological:      General: No focal deficit present.      Mental Status: She is alert and oriented to person, place, and time.   Psychiatric:         Attention and Perception: Attention and perception normal.         Mood and Affect: Mood is anxious. Mood is not depressed.         Speech: Speech normal.         Behavior: Behavior normal. Behavior is cooperative.         Thought Content: Thought content normal.         Cognition and Memory: Cognition and memory normal.         Judgment: Judgment normal.     Vital Signs:   /68   Pulse 101   Ht 165.1 cm (65\")   Wt 68 kg (150 lb)   BMI 24.96 kg/m²      Lab Results:   Telemedicine on 11/01/2023   Component Date Value Ref Range Status   • External Amphetamine Screen Urine 11/01/2023 Negative   Final   • External Benzodiazepine Screen Uri* 11/01/2023 Negative   Final   • External Cocaine Screen Urine 11/01/2023 Negative   Final   • External THC Screen Urine 11/01/2023 Negative   Final   • External Methadone Screen Urine 11/01/2023 Negative   Final   • External Methamphetamine Screen Ur* 11/01/2023 Negative   Final   • External " Oxycodone Screen Urine 11/01/2023 Negative   Final   • External Buprenorphine Screen Urine 11/01/2023 Positive (A)   Final   • External MDMA 11/01/2023 Negative   Final   • External Opiates Screen Urine 11/01/2023 Negative   Final   Lab on 10/19/2023   Component Date Value Ref Range Status   • Iron 10/19/2023 85  37 - 145 mcg/dL Final   • Iron Saturation (TSAT) 10/19/2023 22  20 - 50 % Final   • Transferrin 10/19/2023 255  200 - 360 mg/dL Final   • TIBC 10/19/2023 380  298 - 536 mcg/dL Final   • Ferritin 10/19/2023 50.49  13.00 - 150.00 ng/mL Final   • Glucose 10/19/2023 80  65 - 99 mg/dL Final   • BUN 10/19/2023 11  6 - 20 mg/dL Final   • Creatinine 10/19/2023 1.32 (H)  0.57 - 1.00 mg/dL Final   • Sodium 10/19/2023 140  136 - 145 mmol/L Final   • Potassium 10/19/2023 4.1  3.5 - 5.2 mmol/L Final   • Chloride 10/19/2023 107  98 - 107 mmol/L Final   • CO2 10/19/2023 21.8 (L)  22.0 - 29.0 mmol/L Final   • Calcium 10/19/2023 9.4  8.6 - 10.5 mg/dL Final   • Total Protein 10/19/2023 6.8  6.0 - 8.5 g/dL Final   • Albumin 10/19/2023 4.3  3.5 - 5.2 g/dL Final   • ALT (SGPT) 10/19/2023 10  1 - 33 U/L Final   • AST (SGOT) 10/19/2023 16  1 - 32 U/L Final   • Alkaline Phosphatase 10/19/2023 78  39 - 117 U/L Final   • Total Bilirubin 10/19/2023 0.2  0.0 - 1.2 mg/dL Final   • Globulin 10/19/2023 2.5  gm/dL Final   • A/G Ratio 10/19/2023 1.7  g/dL Final   • BUN/Creatinine Ratio 10/19/2023 8.3  7.0 - 25.0 Final   • Anion Gap 10/19/2023 11.2  5.0 - 15.0 mmol/L Final   • eGFR 10/19/2023 55.5 (L)  >60.0 mL/min/1.73 Final   • WBC 10/19/2023 5.95  3.40 - 10.80 10*3/mm3 Final   • RBC 10/19/2023 5.11  3.77 - 5.28 10*6/mm3 Final   • Hemoglobin 10/19/2023 12.4  12.0 - 15.9 g/dL Final   • Hematocrit 10/19/2023 41.1  34.0 - 46.6 % Final   • MCV 10/19/2023 80.4  79.0 - 97.0 fL Final   • MCH 10/19/2023 24.3 (L)  26.6 - 33.0 pg Final   • MCHC 10/19/2023 30.2 (L)  31.5 - 35.7 g/dL Final   • RDW 10/19/2023    Final    Unable to calculate.       •  RDW-SD 10/19/2023    Final    Unable to calculate.       • MPV 10/19/2023 10.6  6.0 - 12.0 fL Final   • Platelets 10/19/2023 188  140 - 450 10*3/mm3 Final   • Neutrophil % 10/19/2023 57.3  42.7 - 76.0 % Final   • Lymphocyte % 10/19/2023 20.5  19.6 - 45.3 % Final   • Monocyte % 10/19/2023 10.1  5.0 - 12.0 % Final   • Eosinophil % 10/19/2023 9.9 (H)  0.3 - 6.2 % Final   • Basophil % 10/19/2023 1.5  0.0 - 1.5 % Final   • Immature Grans % 10/19/2023 0.7 (H)  0.0 - 0.5 % Final   • Neutrophils, Absolute 10/19/2023 3.41  1.70 - 7.00 10*3/mm3 Final   • Lymphocytes, Absolute 10/19/2023 1.22  0.70 - 3.10 10*3/mm3 Final   • Monocytes, Absolute 10/19/2023 0.60  0.10 - 0.90 10*3/mm3 Final   • Eosinophils, Absolute 10/19/2023 0.59 (H)  0.00 - 0.40 10*3/mm3 Final   • Basophils, Absolute 10/19/2023 0.09  0.00 - 0.20 10*3/mm3 Final   • Immature Grans, Absolute 10/19/2023 0.04  0.00 - 0.05 10*3/mm3 Final   • nRBC 10/19/2023 0.0  0.0 - 0.2 /100 WBC Final   Telemedicine on 10/04/2023   Component Date Value Ref Range Status   • External Amphetamine Screen Urine 10/04/2023 Negative   Final   • External Benzodiazepine Screen Uri* 10/04/2023 Negative   Final   • External Cocaine Screen Urine 10/04/2023 Negative   Final   • External THC Screen Urine 10/04/2023 Negative   Final   • External Methadone Screen Urine 10/04/2023 Negative   Final   • External Methamphetamine Screen Ur* 10/04/2023 Negative   Final   • External Oxycodone Screen Urine 10/04/2023 Negative   Final   • External Buprenorphine Screen Urine 10/04/2023 Positive (A)   Final   • External MDMA 10/04/2023 Negative   Final   • External Opiates Screen Urine 10/04/2023 Negative   Final   Telemedicine on 09/07/2023   Component Date Value Ref Range Status   • External Amphetamine Screen Urine 09/07/2023 Negative   Final   • External Benzodiazepine Screen Uri* 09/07/2023 Negative   Final   • External Cocaine Screen Urine 09/07/2023 Negative   Final   • External THC Screen Urine  09/07/2023 Negative   Final   • External Methadone Screen Urine 09/07/2023 Negative   Final   • External Methamphetamine Screen Ur* 09/07/2023 Negative   Final   • External Oxycodone Screen Urine 09/07/2023 Negative   Final   • External Buprenorphine Screen Urine 09/07/2023 Positive (A)   Final   • External MDMA 09/07/2023 Negative   Final   • External Opiates Screen Urine 09/07/2023 Negative   Final   Lab on 09/07/2023   Component Date Value Ref Range Status   • Glucose 09/07/2023 78  65 - 99 mg/dL Final   • BUN 09/07/2023 9  6 - 20 mg/dL Final   • Creatinine 09/07/2023 0.91  0.57 - 1.00 mg/dL Final   • Sodium 09/07/2023 137  136 - 145 mmol/L Final   • Potassium 09/07/2023 4.3  3.5 - 5.2 mmol/L Final   • Chloride 09/07/2023 106  98 - 107 mmol/L Final   • CO2 09/07/2023 21.9 (L)  22.0 - 29.0 mmol/L Final   • Calcium 09/07/2023 9.3  8.6 - 10.5 mg/dL Final   • Total Protein 09/07/2023 7.4  6.0 - 8.5 g/dL Final   • Albumin 09/07/2023 4.6  3.5 - 5.2 g/dL Final   • ALT (SGPT) 09/07/2023 13  1 - 33 U/L Final   • AST (SGOT) 09/07/2023 18  1 - 32 U/L Final   • Alkaline Phosphatase 09/07/2023 78  39 - 117 U/L Final   • Total Bilirubin 09/07/2023 <0.2  0.0 - 1.2 mg/dL Final   • Globulin 09/07/2023 2.8  gm/dL Final   • A/G Ratio 09/07/2023 1.6  g/dL Final   • BUN/Creatinine Ratio 09/07/2023 9.9  7.0 - 25.0 Final   • Anion Gap 09/07/2023 9.1  5.0 - 15.0 mmol/L Final   • eGFR 09/07/2023 86.7  >60.0 mL/min/1.73 Final   • Iron 09/07/2023 12 (L)  37 - 145 mcg/dL Final   • Iron Saturation (TSAT) 09/07/2023 2 (L)  20 - 50 % Final   • Transferrin 09/07/2023 336  200 - 360 mg/dL Final   • TIBC 09/07/2023 501  298 - 536 mcg/dL Final   • Ferritin 09/07/2023 6.38 (L)  13.00 - 150.00 ng/mL Final   • Copper 09/07/2023 148  80 - 158 ug/dL Final                                    Detection Limit = 5   • Zinc 09/07/2023 102  44 - 115 ug/dL Final                                    Detection Limit = 5   • Vitamin B-12 09/07/2023 591 280 - 590  pg/mL Final   • Folate 2023 11.10  4.78 - 24.20 ng/mL Final   • TSH 2023 1.600  0.270 - 4.200 uIU/mL Final   • Reference Lab Report 2023    Final                    Value:[facility]  [address] [place] [address]  Phone: [phone] or [phone]  Fax: [phone]  Vikas Lyon M.D., Medical Director    PATIENT NAME                                 LABORATORY NO.  [id number]   SARAY TURNER                              [id number]  [id number]  [facility]                          AGE                SEX     SSN            CLIENT REF #  [id number]   F       [id number]    [id number]  [address] [place] [address]                               REQUESTING M.D.       ATTENDING M.D..        COPY TO..  SOCORRO SALAS PAUL    DATE COLLECTED        DATE RECEIVED          DATE REPORTED  2023    DIAGNOSIS:  PERIPHERAL SMEAR  Microcytic anemia with mild anisocytosis including elliptocytes.  Normal WBC count with mild eosinophilia.  Granulocytes show normal  morphology and no circulating blasts identified.  Adequate platelets with                           occasional giant platelets.      CLINICAL HISTORY:  Iron deficiency anemia, unspecified iron deficiency anemia type    CLINICAL LABORATORY DATA  WBC        7.57 x10/3/-L    RDW         18.5%  HGB        8.3g/dl          PLT         252 x10/3/-L  HCT        31.5%            LYMPHS      15.7%  MCV        68.8fL           NEUTS       67.0%  MONO        8.9%  EOS         6.2%  BASO        1.7%    PERIPHERAL SMEAR MICROSCOPIC DESCRIPTION:  Liz stained smears are reviewed microscopically. See diagnosis for details.    Professional interpretation rendered by Tiffanie Rod M.D., MPH at [facility], [address] [place] [address].    GROSS DESCRIPTION:  RECEIVED 2 SLIDES FOR REVIEW- LM 23    REVIEWED, DIAGNOSED AND  ELECTRONICALLY  SIGNED BY:    Tiffanie Rod M.D., MPH  CPT CODES:        [id number]     • Reticulocyte % 09/07/2023 1.37  0.70 - 1.90 % Final   • Reticulocyte Absolute 09/07/2023 0.0623  0.0200 - 0.1300 10*6/mm3 Final   • Haptoglobin 09/07/2023 189  30 - 200 mg/dL Final   • Sed Rate 09/07/2023 5  0 - 20 mm/hr Final   • C-Reactive Protein 09/07/2023 <0.30  0.00 - 0.50 mg/dL Final   • LDH 09/07/2023 209  135 - 214 U/L Final   • Tissue Transglutaminase IgA 09/07/2023 <2  0 - 3 U/mL Final                                  Negative        0 -  3                                Weak Positive   4 - 10                                Positive           >10   Tissue Transglutaminase (tTG) has been identified   as the endomysial antigen.  Studies have demonstr-   ated that endomysial IgA antibodies have over 99%   specificity for gluten sensitive enteropathy.   • WBC 09/07/2023 7.57  3.40 - 10.80 10*3/mm3 Final   • RBC 09/07/2023 4.58  3.77 - 5.28 10*6/mm3 Final   • Hemoglobin 09/07/2023 8.3 (L)  12.0 - 15.9 g/dL Final   • Hematocrit 09/07/2023 31.5 (L)  34.0 - 46.6 % Final   • MCV 09/07/2023 68.8 (L)  79.0 - 97.0 fL Final   • MCH 09/07/2023 18.1 (L)  26.6 - 33.0 pg Final   • MCHC 09/07/2023 26.3 (L)  31.5 - 35.7 g/dL Final   • RDW 09/07/2023 18.5 (H)  12.3 - 15.4 % Final   • RDW-SD 09/07/2023 44.7  37.0 - 54.0 fl Final   • MPV 09/07/2023    Final    Unable to calculate.       • Platelets 09/07/2023 252  140 - 450 10*3/mm3 Final   • Neutrophil % 09/07/2023 67.0  42.7 - 76.0 % Final   • Lymphocyte % 09/07/2023 15.7 (L)  19.6 - 45.3 % Final   • Monocyte % 09/07/2023 8.9  5.0 - 12.0 % Final   • Eosinophil % 09/07/2023 6.2  0.3 - 6.2 % Final   • Basophil % 09/07/2023 1.7 (H)  0.0 - 1.5 % Final   • Immature Grans % 09/07/2023 0.5  0.0 - 0.5 % Final   • Neutrophils, Absolute 09/07/2023 5.07  1.70 - 7.00 10*3/mm3 Final   • Lymphocytes, Absolute 09/07/2023 1.19  0.70 - 3.10 10*3/mm3 Final   • Monocytes, Absolute 09/07/2023 0.67  0.10 - 0.90  10*3/mm3 Final   • Eosinophils, Absolute 09/07/2023 0.47 (H)  0.00 - 0.40 10*3/mm3 Final   • Basophils, Absolute 09/07/2023 0.13  0.00 - 0.20 10*3/mm3 Final   • Immature Grans, Absolute 09/07/2023 0.04  0.00 - 0.05 10*3/mm3 Final   • nRBC 09/07/2023 0.0  0.0 - 0.2 /100 WBC Final   • Anisocytosis 09/07/2023 Mod/2+  None Seen Final   • Hypochromia 09/07/2023 Mod/2+  None Seen Final   • Microcytes 09/07/2023 Large/3+  None Seen Final   • Platelet Morphology 09/07/2023 Normal  Normal Final   Telemedicine on 08/10/2023   Component Date Value Ref Range Status   • External Amphetamine Screen Urine 08/10/2023 Negative   Final   • External Benzodiazepine Screen Uri* 08/10/2023 Negative   Final   • External Cocaine Screen Urine 08/10/2023 Negative   Final   • External THC Screen Urine 08/10/2023 Negative   Final   • External Methadone Screen Urine 08/10/2023 Negative   Final   • External Methamphetamine Screen Ur* 08/10/2023 Negative   Final   • External Oxycodone Screen Urine 08/10/2023 Negative   Final   • External Buprenorphine Screen Urine 08/10/2023 Positive (A)   Final   • External MDMA 08/10/2023 Negative   Final   • External Opiates Screen Urine 08/10/2023 Negative   Final   Telemedicine on 07/13/2023   Component Date Value Ref Range Status   • External Amphetamine Screen Urine 07/13/2023 Negative   Final   • External Benzodiazepine Screen Uri* 07/13/2023 Negative   Final   • External Cocaine Screen Urine 07/13/2023 Negative   Final   • External THC Screen Urine 07/13/2023 Negative   Final   • External Methadone Screen Urine 07/13/2023 Negative   Final   • External Methamphetamine Screen Ur* 07/13/2023 Negative   Final   • External Oxycodone Screen Urine 07/13/2023 Negative   Final   • External Buprenorphine Screen Urine 07/13/2023 Negative   Final   • External MDMA 07/13/2023 Negative   Final   • External Opiates Screen Urine 07/13/2023 Negative   Final   Office Visit on 06/15/2023   Component Date Value Ref Range  Status   • External Amphetamine Screen Urine 06/15/2023 Negative   Final   • External Benzodiazepine Screen Uri* 06/15/2023 Negative   Final   • External Cocaine Screen Urine 06/15/2023 Negative   Final   • External THC Screen Urine 06/15/2023 Negative   Final   • External Methadone Screen Urine 06/15/2023 Negative   Final   • External Methamphetamine Screen Ur* 06/15/2023 Negative   Final   • External Oxycodone Screen Urine 06/15/2023 Negative   Final   • External Buprenorphine Screen Urine 06/15/2023 Positive (A)   Final   • External MDMA 06/15/2023 Negative   Final   • External Opiates Screen Urine 06/15/2023 Negative   Final   Lab on 05/24/2023   Component Date Value Ref Range Status   • Ferritin 05/24/2023 3.40 (L)  13.00 - 150.00 ng/mL Final   • Iron 05/24/2023 14 (L)  37 - 145 mcg/dL Final   • Iron Saturation (TSAT) 05/24/2023 2 (L)  20 - 50 % Final   • Transferrin 05/24/2023 383 (H)  200 - 360 mg/dL Final   • TIBC 05/24/2023 571 (H)  298 - 536 mcg/dL Final         Assessment & Plan   Diagnoses and all orders for this visit:    1. Opioid type dependence, continuous (Primary)  -     buprenorphine-naloxone (SUBOXONE) 8-2 MG per SL tablet; Place 2 tablets under the tongue Daily.  Dispense: 56 tablet; Refill: 0    2. Medication management  -     [facility] Drug Screen    3. Attention deficit hyperactivity disorder (ADHD), predominantly inattentive type  -     atomoxetine (STRATTERA) 40 MG capsule; Take 1 capsule by mouth Daily.  Dispense: 30 capsule; Refill: 0    4. Impaired concentration  -     atomoxetine (STRATTERA) 40 MG capsule; Take 1 capsule by mouth Daily.  Dispense: 30 capsule; Refill: 0    5. Generalized anxiety disorder  -     FLUoxetine (PROzac) 20 MG capsule; Take 1 capsule by mouth Daily.  Dispense: 30 capsule; Refill: 0  -     cloNIDine (CATAPRES) 0.1 MG tablet; Take 1 tablet by mouth 2 (Two) Times a Day As Needed (anxiety, panic attacks).  Dispense: 60 tablet; Refill: 0        Visit Diagnoses:     ICD-10-CM ICD-9-CM   1. Opioid type dependence, continuous  F11.20 304.01   2. Medication management  Z79.899 V58.69   3. Attention deficit hyperactivity disorder (ADHD), predominantly inattentive type  F90.0 314.00   4. Impaired concentration  R41.840 799.51   5. Generalized anxiety disorder  F41.1 300.02       PLAN:  Safety: No acute safety concerns  Risk Assessment: Risk of self-harm acutely is low. Risk of self-harm chronically is also low, but could be further elevated in the event of treatment noncompliance and/or AODA.    TREATMENT PLAN/GOALS: Continue supportive psychotherapy efforts and medications as indicated. Treatment and medication options discussed during today's visit. Patient acknowledged and verbally consented to continue with current treatment plan and was educated on the importance of compliance with treatment and follow-up appointments.    MEDICATION ISSUES:  DONG reviewed as expected.  Discussed medication options and treatment plan of prescribed medication as well as the risks, benefits, and side effects including potential falls, possible impaired driving and metabolic adversities among others. Patient is agreeable to call the office with any worsening of symptoms or onset of side effects. Patient is agreeable to call 911 or go to the nearest ER should he/she begin having SI/HI. No medication side effects or related complaints today.     MEDS ORDERED DURING VISIT:  New Medications Ordered This Visit   Medications   • buprenorphine-naloxone (SUBOXONE) 8-2 MG per SL tablet     Sig: Place 2 tablets under the tongue Daily.     Dispense:  56 tablet     Refill:  0     NADEAN:[id number]   • atomoxetine (STRATTERA) 40 MG capsule     Sig: Take 1 capsule by mouth Daily.     Dispense:  30 capsule     Refill:  0   • FLUoxetine (PROzac) 20 MG capsule     Sig: Take 1 capsule by mouth Daily.     Dispense:  30 capsule     Refill:  0   • cloNIDine (CATAPRES) 0.1 MG tablet     Sig: Take 1 tablet by mouth 2  (Two) Times a Day As Needed (anxiety, panic attacks).     Dispense:  60 tablet     Refill:  0       No follow-ups on file.           This document has been electronically signed by ALMA Garcia  November 2, 2023 08:50 EDT      Part of this note may be an electronic transcription/translation of spoken language to printed text using the Dragon Dictation System.

## 2023-11-29 ENCOUNTER — HOSPITAL ENCOUNTER (EMERGENCY)
Facility: HOSPITAL | Age: 33
Discharge: HOME | End: 2023-11-29
Attending: EMERGENCY MEDICINE
Payer: COMMERCIAL

## 2023-11-29 ENCOUNTER — APPOINTMENT (OUTPATIENT)
Dept: GENERAL RADIOLOGY | Facility: HOSPITAL | Age: 33
End: 2023-11-29
Payer: COMMERCIAL

## 2023-11-29 ENCOUNTER — TELEMEDICINE (OUTPATIENT)
Dept: PSYCHIATRY | Facility: CLINIC | Age: 33
End: 2023-11-29
Payer: COMMERCIAL

## 2023-11-29 VITALS
BODY MASS INDEX: 25.83 KG/M2 | TEMPERATURE: 98.2 F | WEIGHT: 155 LBS | HEART RATE: 98 BPM | DIASTOLIC BLOOD PRESSURE: 76 MMHG | SYSTOLIC BLOOD PRESSURE: 128 MMHG | OXYGEN SATURATION: 99 % | RESPIRATION RATE: 17 BRPM | HEIGHT: 65 IN

## 2023-11-29 VITALS
HEART RATE: 116 BPM | WEIGHT: 155.2 LBS | DIASTOLIC BLOOD PRESSURE: 84 MMHG | HEIGHT: 65 IN | SYSTOLIC BLOOD PRESSURE: 151 MMHG | BODY MASS INDEX: 25.86 KG/M2

## 2023-11-29 DIAGNOSIS — F11.20 OPIOID TYPE DEPENDENCE, CONTINUOUS: Primary | ICD-10-CM

## 2023-11-29 DIAGNOSIS — Z79.899 MEDICATION MANAGEMENT: ICD-10-CM

## 2023-11-29 DIAGNOSIS — R07.9 CHEST PAIN, UNSPECIFIED TYPE: Primary | ICD-10-CM

## 2023-11-29 LAB
ALBUMIN SERPL-MCNC: 4.4 G/DL (ref 3.5–5.2)
ALBUMIN/GLOB SERPL: 1.6 G/DL
ALP SERPL-CCNC: 70 U/L (ref 39–117)
ALT SERPL W P-5'-P-CCNC: 8 U/L (ref 1–33)
ANION GAP SERPL CALCULATED.3IONS-SCNC: 10.2 MMOL/L (ref 5–15)
AST SERPL-CCNC: 19 U/L (ref 1–32)
BASOPHILS # BLD AUTO: 0.12 10*3/MM3 (ref 0–0.2)
BASOPHILS NFR BLD AUTO: 1.9 % (ref 0–1.5)
BILIRUB SERPL-MCNC: 0.2 MG/DL (ref 0–1.2)
BUN SERPL-MCNC: 7 MG/DL (ref 6–20)
BUN/CREAT SERPL: 6.7 (ref 7–25)
CALCIUM SPEC-SCNC: 9.4 MG/DL (ref 8.6–10.5)
CHLORIDE SERPL-SCNC: 103 MMOL/L (ref 98–107)
CO2 SERPL-SCNC: 23.8 MMOL/L (ref 22–29)
CREAT SERPL-MCNC: 1.04 MG/DL (ref 0.57–1)
D DIMER PPP FEU-MCNC: 0.4 MCGFEU/ML (ref 0–0.5)
DEPRECATED RDW RBC AUTO: 63.9 FL (ref 37–54)
EGFRCR SERPLBLD CKD-EPI 2021: 73.8 ML/MIN/1.73
EOSINOPHIL # BLD AUTO: 0.6 10*3/MM3 (ref 0–0.4)
EOSINOPHIL NFR BLD AUTO: 9.3 % (ref 0.3–6.2)
ERYTHROCYTE [DISTWIDTH] IN BLOOD BY AUTOMATED COUNT: 18.6 % (ref 12.3–15.4)
EXTERNAL AMPHETAMINE SCREEN URINE: NEGATIVE
EXTERNAL BENZODIAZEPINE SCREEN URINE: NEGATIVE
EXTERNAL BUPRENORPHINE SCREEN URINE: POSITIVE
EXTERNAL COCAINE SCREEN URINE: NEGATIVE
EXTERNAL MDMA: NEGATIVE
EXTERNAL METHADONE SCREEN URINE: NEGATIVE
EXTERNAL METHAMPHETAMINE SCREEN URINE: NEGATIVE
EXTERNAL OPIATES SCREEN URINE: NEGATIVE
EXTERNAL OXYCODONE SCREEN URINE: NEGATIVE
EXTERNAL THC SCREEN URINE: NEGATIVE
GLOBULIN UR ELPH-MCNC: 2.8 GM/DL
GLUCOSE SERPL-MCNC: 89 MG/DL (ref 65–99)
HCT VFR BLD AUTO: 47.8 % (ref 34–46.6)
HGB BLD-MCNC: 14.7 G/DL (ref 12–15.9)
HOLD SPECIMEN: NORMAL
HOLD SPECIMEN: NORMAL
IMM GRANULOCYTES # BLD AUTO: 0.02 10*3/MM3 (ref 0–0.05)
IMM GRANULOCYTES NFR BLD AUTO: 0.3 % (ref 0–0.5)
LYMPHOCYTES # BLD AUTO: 1.4 10*3/MM3 (ref 0.7–3.1)
LYMPHOCYTES NFR BLD AUTO: 21.7 % (ref 19.6–45.3)
MCH RBC QN AUTO: 27.6 PG (ref 26.6–33)
MCHC RBC AUTO-ENTMCNC: 30.8 G/DL (ref 31.5–35.7)
MCV RBC AUTO: 89.8 FL (ref 79–97)
MONOCYTES # BLD AUTO: 0.59 10*3/MM3 (ref 0.1–0.9)
MONOCYTES NFR BLD AUTO: 9.1 % (ref 5–12)
NEUTROPHILS NFR BLD AUTO: 3.72 10*3/MM3 (ref 1.7–7)
NEUTROPHILS NFR BLD AUTO: 57.7 % (ref 42.7–76)
NRBC BLD AUTO-RTO: 0 /100 WBC (ref 0–0.2)
PLATELET # BLD AUTO: 180 10*3/MM3 (ref 140–450)
PMV BLD AUTO: ABNORMAL FL
POTASSIUM SERPL-SCNC: 4.5 MMOL/L (ref 3.5–5.2)
PROT SERPL-MCNC: 7.2 G/DL (ref 6–8.5)
RBC # BLD AUTO: 5.32 10*6/MM3 (ref 3.77–5.28)
SODIUM SERPL-SCNC: 137 MMOL/L (ref 136–145)
TROPONIN T SERPL HS-MCNC: <6 NG/L
WBC NRBC COR # BLD AUTO: 6.45 10*3/MM3 (ref 3.4–10.8)
WHOLE BLOOD HOLD COAG: NORMAL
WHOLE BLOOD HOLD SPECIMEN: NORMAL

## 2023-11-29 PROCEDURE — 36415 COLL VENOUS BLD VENIPUNCTURE: CPT

## 2023-11-29 PROCEDURE — 71046 X-RAY EXAM CHEST 2 VIEWS: CPT

## 2023-11-29 PROCEDURE — 93005 ELECTROCARDIOGRAM TRACING: CPT | Performed by: EMERGENCY MEDICINE

## 2023-11-29 PROCEDURE — 84484 ASSAY OF TROPONIN QUANT: CPT | Performed by: EMERGENCY MEDICINE

## 2023-11-29 PROCEDURE — 85379 FIBRIN DEGRADATION QUANT: CPT | Performed by: EMERGENCY MEDICINE

## 2023-11-29 PROCEDURE — 99284 EMERGENCY DEPT VISIT MOD MDM: CPT

## 2023-11-29 PROCEDURE — 85025 COMPLETE CBC W/AUTO DIFF WBC: CPT | Performed by: EMERGENCY MEDICINE

## 2023-11-29 PROCEDURE — 80053 COMPREHEN METABOLIC PANEL: CPT | Performed by: EMERGENCY MEDICINE

## 2023-11-29 RX ORDER — PANTOPRAZOLE SODIUM 40 MG/1
40 TABLET, DELAYED RELEASE ORAL ONCE
Status: COMPLETED | OUTPATIENT
Start: 2023-11-29 | End: 2023-11-29

## 2023-11-29 RX ORDER — ALUMINA, MAGNESIA, AND SIMETHICONE 2400; 2400; 240 MG/30ML; MG/30ML; MG/30ML
15 SUSPENSION ORAL ONCE
Status: COMPLETED | OUTPATIENT
Start: 2023-11-29 | End: 2023-11-29

## 2023-11-29 RX ORDER — BUPRENORPHINE HYDROCHLORIDE AND NALOXONE HYDROCHLORIDE DIHYDRATE 8; 2 MG/1; MG/1
2 TABLET SUBLINGUAL DAILY
Qty: 56 TABLET | Refills: 0 | Status: SHIPPED | OUTPATIENT
Start: 2023-11-29

## 2023-11-29 RX ORDER — SODIUM CHLORIDE 0.9 % (FLUSH) 0.9 %
10 SYRINGE (ML) INJECTION AS NEEDED
Status: DISCONTINUED | OUTPATIENT
Start: 2023-11-29 | End: 2023-11-29 | Stop reason: HOSPADM

## 2023-11-29 RX ORDER — ASPIRIN 325 MG
325 TABLET ORAL ONCE
Status: COMPLETED | OUTPATIENT
Start: 2023-11-29 | End: 2023-11-29

## 2023-11-29 RX ADMIN — PANTOPRAZOLE SODIUM 40 MG: 40 TABLET, DELAYED RELEASE ORAL at 18:22

## 2023-11-29 RX ADMIN — ALUMINUM HYDROXIDE, MAGNESIUM HYDROXIDE, AND DIMETHICONE 15 ML: 400; 400; 40 SUSPENSION ORAL at 18:22

## 2023-11-29 RX ADMIN — ASPIRIN 325 MG: 325 TABLET ORAL at 18:22

## 2023-11-29 NOTE — DISCHARGE INSTRUCTIONS
Follow-up with your primary care doctor in the next 1 week.    Return to the emergency department for worsening pain, nausea, vomiting, fever, chills, sweats or any other concerning signs or symptoms.    Start taking over-the-counter PPI, such as omeprazole, or H2 blocker, such as Pepcid or Zantac, daily.

## 2023-11-29 NOTE — ED PROVIDER NOTES
Subjective   History of Present Illness  33-year-old female, document history of reflux, anemia, anxiety, who presents today for evaluation of chest pain and left shoulder pain.  Pain has been ongoing for 2 to 3 weeks.  Patient states that at night, she starts getting significant reflux, and then starts getting pain that rates to the shoulder.  She saw her primary care doctor today, and as the patient is on Depo-Provera, there is concern about a possible blood clot and the patient was sent to the ER for further evaluation and management.  She denies any fevers, chills, sweats, no severe shortness of breath no hemoptysis, no history of thromboembolic disease, no leg pain or leg swelling.  No recent travel, no prolonged immobilization.          Review of Systems   All other systems reviewed and are negative.      Past Medical History:   Diagnosis Date    Acid reflux     Anemia     Anxiety     Substance abuse        Allergies   Allergen Reactions    Atomoxetine Other (See Comments)     Skin crawling-per patient    Diphenhydramine     Sulfamethoxazole-Trimethoprim        Past Surgical History:   Procedure Laterality Date    APPENDECTOMY       SECTION      ENDOSCOPY N/A 10/28/2022    Procedure: ESOPHAGOGASTRODUODENOSCOPY;  Surgeon: Jayro Bar MD;  Location: [facility];  Service: Gastroenterology;  Laterality: N/A;       Family History   Problem Relation Age of Onset    Heart disease Mother     Heart disease Father     Cancer Maternal Grandmother     Cancer Maternal Grandfather        Social History     Socioeconomic History    Marital status: Single   Tobacco Use    Smoking status: Every Day     Packs/day: 1.00     Years: 7.00     Additional pack years: 0.00     Total pack years: 7.00     Types: Cigarettes     Passive exposure: Current    Smokeless tobacco: Never   Vaping Use    Vaping Use: Never used   Substance and Sexual Activity    Alcohol use: Not Currently    Drug use: Not Currently     Types:  Methamphetamines     Comment: per patient has been clean for about 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    Sexual activity: Defer     Birth control/protection: Depo-provera           Objective   Physical Exam  Vitals and nursing note reviewed.   Constitutional:       General: She is not in acute distress.     Appearance: She is well-developed. She is not diaphoretic.      Comments: Well-appearing 33-year-old female lying in bed no acute distress, nontoxic, well-appearing   HENT:      Head: Normocephalic.      Right Ear: External ear normal.      Left Ear: External ear normal.      Nose: Nose normal.   Eyes:      Conjunctiva/sclera: Conjunctivae normal.      Pupils: Pupils are equal, round, and reactive to light.   Neck:      Vascular: No JVD.      Trachea: No tracheal deviation.   Cardiovascular:      Rate and Rhythm: Regular rhythm. Tachycardia present.      Heart sounds: No murmur heard.  Pulmonary:      Effort: Pulmonary effort is normal. No respiratory distress.      Breath sounds: No wheezing.      Comments: Patient speaking in full sentences no respiratory distress, no retractions, no abdominal breathing.  Abdominal:      Palpations: Abdomen is soft.      Tenderness: There is no abdominal tenderness.      Comments: Abdomen soft, no guarding, rebound, rigidity or signs of peritonitis, no reproducible abdominal tenderness to palpation.   Musculoskeletal:         General: No deformity. Normal range of motion.      Cervical back: Normal range of motion and neck supple.      Comments: Compartment soft without reproducible tenderness.  No lower extremity edema no palpable cord, negative Homans' sign   Skin:     General: Skin is warm and dry.      Coloration: Skin is not pale.      Findings: No erythema or rash.   Neurological:      Mental Status: She is alert.      Cranial Nerves: No cranial nerve deficit.         Procedures           ED Course  ED Course as of 11/29/23 1846   Wed Nov 29, 2023   1622 ECG 12 Lead  Sinus tachycardia  rate of 101, QRS of 78 QTc 430, no ST segment elevation, depression, T wave versions or signs of acute ischemia there is no change in morphology compared to EKG from October 22.  Interpreted by myself.      Electronically signed by Zeeshan Jiménez MD, 11/29/23, 4:23 PM EST.   [SM]   1759 HS Troponin T: <6 [SM]   1817 D-Dimer, Quant: 0.40 [SM]      ED Course User Index  [SM] Zeeshan Jiménez MD                                             Medical Decision Making  33-year-old female, who presents today for evaluation of left-sided chest pain.  Based on history and physical I suspect possibly reflux related as the patient is worse on lying down to burning sensation that radiates to the left shoulder.  She does describe fluttering, however EKG shows a normal sinus rhythm.  Patient was concerned about possible thromboembolic disease, however D-dimer is negative, therefore I think this is less likely.  Troponin negative.  Lower concern for ACS and a healthy 33-year-old female with atypical presentation.  Chest x-ray shows no signs of acute cardiopulmonary process, such as pneumonia, pneumothorax, pulmonary edema.  I think patient can safely be discharged home.  She was given GI cocktail, Protonix here in the emergency department, with moderate improvement of symptoms.  Strict return precautions discussed, patient voices understanding.    Problems Addressed:  Chest pain, unspecified type: complicated acute illness or injury    Amount and/or Complexity of Data Reviewed  Labs: ordered. Decision-making details documented in ED Course.  Radiology: ordered.  ECG/medicine tests: ordered. Decision-making details documented in ED Course.    Risk  OTC drugs.  Prescription drug management.        Final diagnoses:   Chest pain, unspecified type       ED Disposition  ED Disposition       ED Disposition   Discharge    Condition   Stable    Comment   --               No follow-up provider specified.       Medication List       No changes were made to your prescriptions during this visit.            Zeeshan Jiménez MD  11/29/23 [id number]

## 2023-11-29 NOTE — PROGRESS NOTES
This provider is located at [facility]. The Patient is seen remotely located at the [facility] ([facility]) using Video. Patient is being seen via telehealth and confirm that they are in a secure environment for this session. The patient's condition being diagnosed/treated is appropriate for telemedicine. Provider identified as Xu Saeed as well as credentials APRN MERLE FNP-TRE BARTHOLOMEW-JULISSA.   The client/patient gave consent to be seen remotely, and when consent is given they understand that the consent allows for patient identifiable information to be sent to a third party as needed.   They may refuse to be seen remotely at any time. The electronic data is encrypted and password protected, and the patient has been advised of the potential risks to privacy not withstanding such measures.    Advised patient to go to Emergency Department now due to concern for thrombosis    Chief Complaint/History of Present Illness: Follow Up buprenorphine/naloxone Medicated Assisted Treatment for Opiate Use Disorder     Patient/Client Concerns/Updates:  Continue Prozac and clonidine for anxiety, Strattera for difficulty concentrating     -Patient reports she does not take psychotropic medications consistently as she often forgets; discussed with patient need to organize her medication regimen to be consistent with treatment  -My concern for patient today is thrombosis; patient takes hormonal birth control and concurrently smokes; considering  the associated risk and also considering over the past week patient has developed upper middle back pain, heart palpitations is experiencing increased work of breathing and is also tachycardic-I explained to patient very clearly that I feel she is at high risk of thrombosis and must report to the emergency department now to rule out thrombosis or any other cardiopulmonary pathology.  Patient agrees to go to the emergency department now  -As it relates to her mood continued  fluctuating anxious symptoms and difficulty concentrating patient states she is coping adequately  -Denies depressive episodes, no suicidal or homicidal thoughts    Triggers (Persons/Places/Things/Events/Thought/Emotions): Anxiety and difficulty concentrating    Cravings/Substance Use: Denies cravings or use of illicit substances    Relapse Prevention: Counseling    Urine Drug Screen (today's visit) discussed: Positive buprenorphine, otherwise negative for substances tested    UDS Confirmation (Most recent/Resulted): Positive buprenorphine/nor-buprenorphine, no concerns for the tampering otherwise positive gabapentin    Most recent pertinent laboratory studies reviewed: 10/24/2022-hepatic function within normal limits, Cr WNL          DONG (PDMP) Reviewed for Current/Active Medications: buprenorphine/naloxone and gabapentin as reviewed today.    Past Surgical History:  Past Surgical History:   Procedure Laterality Date   • APPENDECTOMY     •  SECTION     • ENDOSCOPY N/A 10/28/2022    Procedure: ESOPHAGOGASTRODUODENOSCOPY;  Surgeon: Jayro Bar MD;  Location: [facility];  Service: Gastroenterology;  Laterality: N/A;       Problem List:  Patient Active Problem List   Diagnosis   • Esophageal dysphagia   • Iron deficiency anemia, unspecified   • Malabsorption of iron       Allergy:   Allergies   Allergen Reactions   • Atomoxetine Other (See Comments)     Skin crawling-per patient   • Diphenhydramine    • Sulfamethoxazole-Trimethoprim         Current Medications:   Current Outpatient Medications   Medication Sig Dispense Refill   • albuterol sulfate  (90 Base) MCG/ACT inhaler      • atomoxetine (STRATTERA) 40 MG capsule Take 1 capsule by mouth Daily. 30 capsule 0   • buprenorphine-naloxone (SUBOXONE) 8-2 MG per SL tablet Place 2 tablets under the tongue Daily. 56 tablet 0   • cloNIDine (CATAPRES) 0.1 MG tablet Take 1 tablet by mouth 2 (Two) Times a Day As Needed (anxiety, panic attacks). 60 tablet 0    • estradiol cypionate (DEPO-ESTRADIOL) 5 MG/ML injection Inject  into the appropriate muscle as directed by prescriber Every 28 (Twenty-Eight) Days.     • FLUoxetine (PROzac) 20 MG capsule Take 1 capsule by mouth Daily. 30 capsule 0   • hydrOXYzine pamoate (VISTARIL) 25 MG capsule Take 1 capsule by mouth Every 12 (Twelve) Hours.     • ibuprofen (ADVIL,MOTRIN) 200 MG tablet Take 4 tablets by mouth Every 12 (Twelve) Hours.     • metroNIDAZOLE (FLAGYL) 500 MG tablet      • Neurontin 600 MG tablet Take 1 tablet by mouth 3 (Three) Times a Day.     • pantoprazole (PROTONIX) 40 MG EC tablet Take 1 tablet by mouth Daily.     • valACYclovir (VALTREX) 1000 MG tablet      • naloxone (NARCAN) 4 MG/0.1ML nasal spray 1 spray into the nostril(s) as directed by provider As Needed (opiate over sedation). 1 spray in 1 nostril every 2 to 3 minutes call 911 (Patient not taking: Reported on 11/1/2023) 2 each 2     No current facility-administered medications for this visit.       Past Medical History:  Past Medical History:   Diagnosis Date   • Acid reflux    • Anemia    • Anxiety    • Substance abuse          Social History     Socioeconomic History   • Marital status: Single   Tobacco Use   • Smoking status: Every Day     Packs/day: 1.00     Years: 7.00     Additional pack years: 0.00     Total pack years: 7.00     Types: Cigarettes     Passive exposure: Current   • Smokeless tobacco: Never   Vaping Use   • Vaping Use: Never used   Substance and Sexual Activity   • Alcohol use: Not Currently   • Drug use: Not Currently     Types: Methamphetamines     Comment: per patient has been clean for about 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   • Sexual activity: Defer     Birth control/protection: Depo-provera         Family History   Problem Relation Age of Onset   • Heart disease Mother    • Heart disease Father    • Cancer Maternal Grandmother    • Cancer Maternal Grandfather          Mental Status Exam:   Hygiene:   good  Cooperation:  Cooperative  Eye  Contact:  Good  Psychomotor Behavior:  Appropriate  Affect:  Appropriate  Mood: anxious  Speech:  Normal  Thought Process:  Goal directed  Thought Content:  Normal  Suicidal:  None  Homicidal:  None  Hallucinations:  None  Delusion:  None  Memory:  Intact  Orientation:  Grossly intact  Reliability:  good  Insight:  Fair  Judgement:  Fair  Impulse Control:  Good         Review of Systems:  Review of Systems   Constitutional:  Negative for activity change, chills, diaphoresis and fatigue.   Respiratory:  Negative for apnea, cough and shortness of breath.    Cardiovascular:  Positive for chest pain. Negative for palpitations and leg swelling.   Gastrointestinal:  Negative for abdominal pain, constipation, diarrhea, nausea and vomiting.   Genitourinary:  Negative for difficulty urinating.   Musculoskeletal:  Positive for back pain. Negative for arthralgias.   Skin:  Negative for rash.   Neurological:  Negative for dizziness, weakness and headaches.   Psychiatric/Behavioral:  Negative for agitation, self-injury, sleep disturbance and suicidal ideas. The patient is nervous/anxious.        Physical Exam:  Physical Exam  Vitals reviewed.   Constitutional:       General: She is not in acute distress.     Appearance: Normal appearance. She is not ill-appearing or toxic-appearing.   Pulmonary:      Effort: Pulmonary effort is normal.   Musculoskeletal:         General: Normal range of motion.   Neurological:      General: No focal deficit present.      Mental Status: She is alert and oriented to person, place, and time.   Psychiatric:         Attention and Perception: Attention and perception normal.         Mood and Affect: Mood normal. Mood is anxious. Mood is not depressed.         Speech: Speech normal.         Behavior: Behavior normal. Behavior is cooperative.         Thought Content: Thought content normal.         Cognition and Memory: Cognition and memory normal.         Judgment: Judgment normal.     Vital Signs:  "  /84   Pulse 116   Ht 165.1 cm (65\")   Wt 70.4 kg (155 lb 3.2 oz)   BMI 25.83 kg/m²      Lab Results:   Telemedicine on 11/29/2023   Component Date Value Ref Range Status   • External Amphetamine Screen Urine 11/29/2023 Negative   Final   • External Benzodiazepine Screen Uri* 11/29/2023 Negative   Final   • External Cocaine Screen Urine 11/29/2023 Negative   Final   • External THC Screen Urine 11/29/2023 Negative   Final   • External Methadone Screen Urine 11/29/2023 Negative   Final   • External Methamphetamine Screen Ur* 11/29/2023 Negative   Final   • External Oxycodone Screen Urine 11/29/2023 Negative   Final   • External Buprenorphine Screen Urine 11/29/2023 Positive (A)   Final   • External MDMA 11/29/2023 Negative   Final   • External Opiates Screen Urine 11/29/2023 Negative   Final   Telemedicine on 11/01/2023   Component Date Value Ref Range Status   • External Amphetamine Screen Urine 11/01/2023 Negative   Final   • External Benzodiazepine Screen Uri* 11/01/2023 Negative   Final   • External Cocaine Screen Urine 11/01/2023 Negative   Final   • External THC Screen Urine 11/01/2023 Negative   Final   • External Methadone Screen Urine 11/01/2023 Negative   Final   • External Methamphetamine Screen Ur* 11/01/2023 Negative   Final   • External Oxycodone Screen Urine 11/01/2023 Negative   Final   • External Buprenorphine Screen Urine 11/01/2023 Positive (A)   Final   • External MDMA 11/01/2023 Negative   Final   • External Opiates Screen Urine 11/01/2023 Negative   Final   Lab on 10/19/2023   Component Date Value Ref Range Status   • Iron 10/19/2023 85  37 - 145 mcg/dL Final   • Iron Saturation (TSAT) 10/19/2023 22  20 - 50 % Final   • Transferrin 10/19/2023 255  200 - 360 mg/dL Final   • TIBC 10/19/2023 380  298 - 536 mcg/dL Final   • Ferritin 10/19/2023 50.49  13.00 - 150.00 ng/mL Final   • Glucose 10/19/2023 80  65 - 99 mg/dL Final   • BUN 10/19/2023 11  6 - 20 mg/dL Final   • Creatinine 10/19/2023 " 1.32 (H)  0.57 - 1.00 mg/dL Final   • Sodium 10/19/2023 140  136 - 145 mmol/L Final   • Potassium 10/19/2023 4.1  3.5 - 5.2 mmol/L Final   • Chloride 10/19/2023 107  98 - 107 mmol/L Final   • CO2 10/19/2023 21.8 (L)  22.0 - 29.0 mmol/L Final   • Calcium 10/19/2023 9.4  8.6 - 10.5 mg/dL Final   • Total Protein 10/19/2023 6.8  6.0 - 8.5 g/dL Final   • Albumin 10/19/2023 4.3  3.5 - 5.2 g/dL Final   • ALT (SGPT) 10/19/2023 10  1 - 33 U/L Final   • AST (SGOT) 10/19/2023 16  1 - 32 U/L Final   • Alkaline Phosphatase 10/19/2023 78  39 - 117 U/L Final   • Total Bilirubin 10/19/2023 0.2  0.0 - 1.2 mg/dL Final   • Globulin 10/19/2023 2.5  gm/dL Final   • A/G Ratio 10/19/2023 1.7  g/dL Final   • BUN/Creatinine Ratio 10/19/2023 8.3  7.0 - 25.0 Final   • Anion Gap 10/19/2023 11.2  5.0 - 15.0 mmol/L Final   • eGFR 10/19/2023 55.5 (L)  >60.0 mL/min/1.73 Final   • WBC 10/19/2023 5.95  3.40 - 10.80 10*3/mm3 Final   • RBC 10/19/2023 5.11  3.77 - 5.28 10*6/mm3 Final   • Hemoglobin 10/19/2023 12.4  12.0 - 15.9 g/dL Final   • Hematocrit 10/19/2023 41.1  34.0 - 46.6 % Final   • MCV 10/19/2023 80.4  79.0 - 97.0 fL Final   • MCH 10/19/2023 24.3 (L)  26.6 - 33.0 pg Final   • MCHC 10/19/2023 30.2 (L)  31.5 - 35.7 g/dL Final   • RDW 10/19/2023    Final    Unable to calculate.       • RDW-SD 10/19/2023    Final    Unable to calculate.       • MPV 10/19/2023 10.6  6.0 - 12.0 fL Final   • Platelets 10/19/2023 188  140 - 450 10*3/mm3 Final   • Neutrophil % 10/19/2023 57.3  42.7 - 76.0 % Final   • Lymphocyte % 10/19/2023 20.5  19.6 - 45.3 % Final   • Monocyte % 10/19/2023 10.1  5.0 - 12.0 % Final   • Eosinophil % 10/19/2023 9.9 (H)  0.3 - 6.2 % Final   • Basophil % 10/19/2023 1.5  0.0 - 1.5 % Final   • Immature Grans % 10/19/2023 0.7 (H)  0.0 - 0.5 % Final   • Neutrophils, Absolute 10/19/2023 3.41  1.70 - 7.00 10*3/mm3 Final   • Lymphocytes, Absolute 10/19/2023 1.22  0.70 - 3.10 10*3/mm3 Final   • Monocytes, Absolute 10/19/2023 0.60  0.10 - 0.90  10*3/mm3 Final   • Eosinophils, Absolute 10/19/2023 0.59 (H)  0.00 - 0.40 10*3/mm3 Final   • Basophils, Absolute 10/19/2023 0.09  0.00 - 0.20 10*3/mm3 Final   • Immature Grans, Absolute 10/19/2023 0.04  0.00 - 0.05 10*3/mm3 Final   • nRBC 10/19/2023 0.0  0.0 - 0.2 /100 WBC Final   Telemedicine on 10/04/2023   Component Date Value Ref Range Status   • External Amphetamine Screen Urine 10/04/2023 Negative   Final   • External Benzodiazepine Screen Uri* 10/04/2023 Negative   Final   • External Cocaine Screen Urine 10/04/2023 Negative   Final   • External THC Screen Urine 10/04/2023 Negative   Final   • External Methadone Screen Urine 10/04/2023 Negative   Final   • External Methamphetamine Screen Ur* 10/04/2023 Negative   Final   • External Oxycodone Screen Urine 10/04/2023 Negative   Final   • External Buprenorphine Screen Urine 10/04/2023 Positive (A)   Final   • External MDMA 10/04/2023 Negative   Final   • External Opiates Screen Urine 10/04/2023 Negative   Final   Telemedicine on 09/07/2023   Component Date Value Ref Range Status   • External Amphetamine Screen Urine 09/07/2023 Negative   Final   • External Benzodiazepine Screen Uri* 09/07/2023 Negative   Final   • External Cocaine Screen Urine 09/07/2023 Negative   Final   • External THC Screen Urine 09/07/2023 Negative   Final   • External Methadone Screen Urine 09/07/2023 Negative   Final   • External Methamphetamine Screen Ur* 09/07/2023 Negative   Final   • External Oxycodone Screen Urine 09/07/2023 Negative   Final   • External Buprenorphine Screen Urine 09/07/2023 Positive (A)   Final   • External MDMA 09/07/2023 Negative   Final   • External Opiates Screen Urine 09/07/2023 Negative   Final   Lab on 09/07/2023   Component Date Value Ref Range Status   • Glucose 09/07/2023 78  65 - 99 mg/dL Final   • BUN 09/07/2023 9  6 - 20 mg/dL Final   • Creatinine 09/07/2023 0.91  0.57 - 1.00 mg/dL Final   • Sodium 09/07/2023 137  136 - 145 mmol/L Final   • Potassium  2023 4.3  3.5 - 5.2 mmol/L Final   • Chloride 2023 106  98 - 107 mmol/L Final   • CO2 2023 21.9 (L)  22.0 - 29.0 mmol/L Final   • Calcium 2023 9.3  8.6 - 10.5 mg/dL Final   • Total Protein 2023 7.4  6.0 - 8.5 g/dL Final   • Albumin 2023 4.6  3.5 - 5.2 g/dL Final   • ALT (SGPT) 2023 13  1 - 33 U/L Final   • AST (SGOT) 2023 18  1 - 32 U/L Final   • Alkaline Phosphatase 2023 78  39 - 117 U/L Final   • Total Bilirubin 2023 <0.2  0.0 - 1.2 mg/dL Final   • Globulin 2023 2.8  gm/dL Final   • A/G Ratio 2023 1.6  g/dL Final   • BUN/Creatinine Ratio 2023 9.9  7.0 - 25.0 Final   • Anion Gap 2023 9.1  5.0 - 15.0 mmol/L Final   • eGFR 2023 86.7  >60.0 mL/min/1.73 Final   • Iron 2023 12 (L)  37 - 145 mcg/dL Final   • Iron Saturation (TSAT) 2023 2 (L)  20 - 50 % Final   • Transferrin 2023 336  200 - 360 mg/dL Final   • TIBC 2023 501  298 - 536 mcg/dL Final   • Ferritin 2023 6.38 (L)  13.00 - 150.00 ng/mL Final   • Copper 2023 148  80 - 158 ug/dL Final                                    Detection Limit = 5   • Zinc 2023 102  44 - 115 ug/dL Final                                    Detection Limit = 5   • Vitamin B-12 2023 450  211 - 946 pg/mL Final   • Folate 2023 11.10  4.78 - 24.20 ng/mL Final   • TSH 2023 1.600  0.270 - 4.200 uIU/mL Final   • Reference Lab Report 2023    Final                    Value:[facility]  [address] [place] [address]  Phone: [phone] or [phone]  Fax: [phone]  Vikas Lyon M.D., Medical Director    PATIENT NAME                                 LABORATORY NO.  [id number]   SARAY TURNER                              [id number]  [id number]  [facility]                          AGE                SEX     SSN            CLIENT REF #  [id number]       1990   F       [id number]    [id number]  [address]  [place] [address]                               REQUESTING M.D.       ATTENDING M.D..        COPY TO..  SOCORRO SALAS PAUL    DATE COLLECTED        DATE RECEIVED          DATE REPORTED  09/07/2023 09/08/2023 09/11/2023    DIAGNOSIS:  PERIPHERAL SMEAR  Microcytic anemia with mild anisocytosis including elliptocytes.  Normal WBC count with mild eosinophilia.  Granulocytes show normal  morphology and no circulating blasts identified.  Adequate platelets with                           occasional giant platelets.      CLINICAL HISTORY:  Iron deficiency anemia, unspecified iron deficiency anemia type    CLINICAL LABORATORY DATA  WBC        7.57 x10/3/-L    RDW         18.5%  HGB        8.3g/dl          PLT         252 x10/3/-L  HCT        31.5%            LYMPHS      15.7%  MCV        68.8fL           NEUTS       67.0%  MONO        8.9%  EOS         6.2%  BASO        1.7%    PERIPHERAL SMEAR MICROSCOPIC DESCRIPTION:  Liz stained smears are reviewed microscopically. See diagnosis for details.    Professional interpretation rendered by Tiffanie Rod M.D., MPH at [email or web address], [address] [place] [address].    GROSS DESCRIPTION:  RECEIVED 2 SLIDES FOR REVIEW- LM 9/8/23    REVIEWED, DIAGNOSED AND ELECTRONICALLY  SIGNED BY:    Tiffanie Rod M.D., MPH  CPT CODES:        [id number]     • Reticulocyte % 09/07/2023 1.37  0.70 - 1.90 % Final   • Reticulocyte Absolute 09/07/2023 0.0623  0.0200 - 0.1300 10*6/mm3 Final   • Haptoglobin 09/07/2023 189  30 - 200 mg/dL Final   • Sed Rate 09/07/2023 5  0 - 20 mm/hr Final   • C-Reactive Protein 09/07/2023 <0.30  0.00 - 0.50 mg/dL Final   • LDH 09/07/2023 209  135 - 214 U/L Final   • Tissue Transglutaminase IgA 09/07/2023 <2  0 - 3 U/mL Final                                  Negative        0 -  3                                Weak Positive   4 - 10                                Positive           >10   Tissue Transglutaminase  (tTG) has been identified   as the endomysial antigen.  Studies have demonstr-   ated that endomysial IgA antibodies have over 99%   specificity for gluten sensitive enteropathy.   • WBC 09/07/2023 7.57  3.40 - 10.80 10*3/mm3 Final   • RBC 09/07/2023 4.58  3.77 - 5.28 10*6/mm3 Final   • Hemoglobin 09/07/2023 8.3 (L)  12.0 - 15.9 g/dL Final   • Hematocrit 09/07/2023 31.5 (L)  34.0 - 46.6 % Final   • MCV 09/07/2023 68.8 (L)  79.0 - 97.0 fL Final   • MCH 09/07/2023 18.1 (L)  26.6 - 33.0 pg Final   • MCHC 09/07/2023 26.3 (L)  31.5 - 35.7 g/dL Final   • RDW 09/07/2023 18.5 (H)  12.3 - 15.4 % Final   • RDW-SD 09/07/2023 44.7  37.0 - 54.0 fl Final   • MPV 09/07/2023    Final    Unable to calculate.       • Platelets 09/07/2023 252  140 - 450 10*3/mm3 Final   • Neutrophil % 09/07/2023 67.0  42.7 - 76.0 % Final   • Lymphocyte % 09/07/2023 15.7 (L)  19.6 - 45.3 % Final   • Monocyte % 09/07/2023 8.9  5.0 - 12.0 % Final   • Eosinophil % 09/07/2023 6.2  0.3 - 6.2 % Final   • Basophil % 09/07/2023 1.7 (H)  0.0 - 1.5 % Final   • Immature Grans % 09/07/2023 0.5  0.0 - 0.5 % Final   • Neutrophils, Absolute 09/07/2023 5.07  1.70 - 7.00 10*3/mm3 Final   • Lymphocytes, Absolute 09/07/2023 1.19  0.70 - 3.10 10*3/mm3 Final   • Monocytes, Absolute 09/07/2023 0.67  0.10 - 0.90 10*3/mm3 Final   • Eosinophils, Absolute 09/07/2023 0.47 (H)  0.00 - 0.40 10*3/mm3 Final   • Basophils, Absolute 09/07/2023 0.13  0.00 - 0.20 10*3/mm3 Final   • Immature Grans, Absolute 09/07/2023 0.04  0.00 - 0.05 10*3/mm3 Final   • nRBC 09/07/2023 0.0  0.0 - 0.2 /100 WBC Final   • Anisocytosis 09/07/2023 Mod/2+  None Seen Final   • Hypochromia 09/07/2023 Mod/2+  None Seen Final   • Microcytes 09/07/2023 Large/3+  None Seen Final   • Platelet Morphology 09/07/2023 Normal  Normal Final   Telemedicine on 08/10/2023   Component Date Value Ref Range Status   • External Amphetamine Screen Urine 08/10/2023 Negative   Final   • External Benzodiazepine Screen Uri*  08/10/2023 Negative   Final   • External Cocaine Screen Urine 08/10/2023 Negative   Final   • External THC Screen Urine 08/10/2023 Negative   Final   • External Methadone Screen Urine 08/10/2023 Negative   Final   • External Methamphetamine Screen Ur* 08/10/2023 Negative   Final   • External Oxycodone Screen Urine 08/10/2023 Negative   Final   • External Buprenorphine Screen Urine 08/10/2023 Positive (A)   Final   • External MDMA 08/10/2023 Negative   Final   • External Opiates Screen Urine 08/10/2023 Negative   Final   Telemedicine on 07/13/2023   Component Date Value Ref Range Status   • External Amphetamine Screen Urine 07/13/2023 Negative   Final   • External Benzodiazepine Screen Uri* 07/13/2023 Negative   Final   • External Cocaine Screen Urine 07/13/2023 Negative   Final   • External THC Screen Urine 07/13/2023 Negative   Final   • External Methadone Screen Urine 07/13/2023 Negative   Final   • External Methamphetamine Screen Ur* 07/13/2023 Negative   Final   • External Oxycodone Screen Urine 07/13/2023 Negative   Final   • External Buprenorphine Screen Urine 07/13/2023 Negative   Final   • External MDMA 07/13/2023 Negative   Final   • External Opiates Screen Urine 07/13/2023 Negative   Final   Office Visit on 06/15/2023   Component Date Value Ref Range Status   • External Amphetamine Screen Urine 06/15/2023 Negative   Final   • External Benzodiazepine Screen Uri* 06/15/2023 Negative   Final   • External Cocaine Screen Urine 06/15/2023 Negative   Final   • External THC Screen Urine 06/15/2023 Negative   Final   • External Methadone Screen Urine 06/15/2023 Negative   Final   • External Methamphetamine Screen Ur* 06/15/2023 Negative   Final   • External Oxycodone Screen Urine 06/15/2023 Negative   Final   • External Buprenorphine Screen Urine 06/15/2023 Positive (A)   Final   • External MDMA 06/15/2023 Negative   Final   • External Opiates Screen Urine 06/15/2023 Negative   Final         Assessment & Plan    Diagnoses and all orders for this visit:    1. Opioid type dependence, continuous (Primary)  -     buprenorphine-naloxone (SUBOXONE) 8-2 MG per SL tablet; Place 2 tablets under the tongue Daily.  Dispense: 56 tablet; Refill: 0    2. Medication management  -     [facility] Drug Screen    Emergency Department evaluation required now    Visit Diagnoses:    ICD-10-CM ICD-9-CM   1. Opioid type dependence, continuous  F11.20 304.01   2. Medication management  Z79.899 V58.69       PLAN:  Safety: No acute safety concerns  Risk Assessment: Risk of self-harm acutely is low. Risk of self-harm chronically is also low, but could be further elevated in the event of treatment noncompliance and/or AODA.    TREATMENT PLAN/GOALS: Continue supportive psychotherapy efforts and medications as indicated. Treatment and medication options discussed during today's visit. Patient acknowledged and verbally consented to continue with current treatment plan and was educated on the importance of compliance with treatment and follow-up appointments.    MEDICATION ISSUES:  DONG reviewed as expected.  Discussed medication options and treatment plan of prescribed medication as well as the risks, benefits, and side effects including potential falls, possible impaired driving and metabolic adversities among others. Patient is agreeable to call the office with any worsening of symptoms or onset of side effects. Patient is agreeable to call 911 or go to the nearest ER should he/she begin having SI/HI. No medication side effects or related complaints today.     MEDS ORDERED DURING VISIT:  New Medications Ordered This Visit   Medications   • buprenorphine-naloxone (SUBOXONE) 8-2 MG per SL tablet     Sig: Place 2 tablets under the tongue Daily.     Dispense:  56 tablet     Refill:  0     NADEAN:[id number]       No follow-ups on file.           This document has been electronically signed by ALMA Garcia  November 29, 2023 15:56 EST      Part of this  note may be an electronic transcription/translation of spoken language to printed text using the Dragon Dictation System.

## 2023-11-30 LAB
QT INTERVAL: 332 MS
QTC INTERVAL: 430 MS

## 2023-12-26 ENCOUNTER — TELEMEDICINE (OUTPATIENT)
Dept: PSYCHIATRY | Facility: CLINIC | Age: 33
End: 2023-12-26
Payer: COMMERCIAL

## 2023-12-26 VITALS
BODY MASS INDEX: 26.99 KG/M2 | HEIGHT: 65 IN | SYSTOLIC BLOOD PRESSURE: 152 MMHG | DIASTOLIC BLOOD PRESSURE: 68 MMHG | WEIGHT: 162 LBS | HEART RATE: 113 BPM

## 2023-12-26 DIAGNOSIS — F90.0 ATTENTION DEFICIT HYPERACTIVITY DISORDER (ADHD), PREDOMINANTLY INATTENTIVE TYPE: ICD-10-CM

## 2023-12-26 DIAGNOSIS — R41.840 IMPAIRED CONCENTRATION: ICD-10-CM

## 2023-12-26 DIAGNOSIS — Z79.899 MEDICATION MANAGEMENT: ICD-10-CM

## 2023-12-26 DIAGNOSIS — F11.20 OPIOID TYPE DEPENDENCE, CONTINUOUS: Primary | ICD-10-CM

## 2023-12-26 DIAGNOSIS — F41.1 GENERALIZED ANXIETY DISORDER: ICD-10-CM

## 2023-12-26 RX ORDER — ATOMOXETINE 40 MG/1
40 CAPSULE ORAL DAILY
Qty: 30 CAPSULE | Refills: 0 | Status: SHIPPED | OUTPATIENT
Start: 2023-12-26 | End: 2024-12-25

## 2023-12-26 RX ORDER — CLONIDINE HYDROCHLORIDE 0.1 MG/1
0.1 TABLET ORAL 2 TIMES DAILY PRN
Qty: 60 TABLET | Refills: 0 | Status: SHIPPED | OUTPATIENT
Start: 2023-12-26

## 2023-12-26 RX ORDER — FLUOXETINE HYDROCHLORIDE 20 MG/1
20 CAPSULE ORAL DAILY
Qty: 30 CAPSULE | Refills: 0 | Status: SHIPPED | OUTPATIENT
Start: 2023-12-26 | End: 2024-12-25

## 2023-12-26 RX ORDER — BUPRENORPHINE HYDROCHLORIDE AND NALOXONE HYDROCHLORIDE DIHYDRATE 8; 2 MG/1; MG/1
2 TABLET SUBLINGUAL DAILY
Qty: 56 TABLET | Refills: 0 | Status: SHIPPED | OUTPATIENT
Start: 2023-12-26

## 2023-12-26 NOTE — PROGRESS NOTES
This provider is located at [facility]. The Patient is seen remotely located at the [facility] ([facility]) using Video. Patient is being seen via telehealth and confirm that they are in a secure environment for this session. The patient's condition being diagnosed/treated is appropriate for telemedicine. Provider identified as Xu Saeed as well as credentials APRN MSN FNP-TRE BARTHOLOMEW-JULISSA.   The client/patient gave consent to be seen remotely, and when consent is given they understand that the consent allows for patient identifiable information to be sent to a third party as needed.   They may refuse to be seen remotely at any time. The electronic data is encrypted and password protected, and the patient has been advised of the potential risks to privacy not withstanding such measures.    Chief Complaint/History of Present Illness: Follow Up buprenorphine/naloxone Medicated Assisted Treatment for Opiate Use Disorder     Patient/Client Concerns/Updates: Continue Prozac and clonidine for anxiety, Strattera for difficulty concentrating     -Patient reports overall she is doing well  -Patient's main concern today is weight gain; discussed with patient her daily habits and advised patient to cease carbonated soft drinks, practice portion control and to take part in exercise  -Fluctuating anxious symptoms ever patient reports she is coping and managing well  -Denies depressive episodes, no suicidal or homicidal thoughts  -No concerns with sleep    Triggers (Persons/Places/Things/Events/Thought/Emotions): Anxiety difficulty concentrating    Cravings/Substance Use: Denies cravings or use of illicit substances    Relapse Prevention: Counseling    Urine Drug Screen (today's visit) discussed: Positive buprenorphine, otherwise negative for substances tested    UDS Confirmation (Most recent/Resulted): Positive buprenorphine/nor-buprenorphine, no concerns for urine tampering otherwise positive  gabapentin    Most recent pertinent laboratory studies reviewed: 10/24/2022-hepatic function within normal limits, Cr WNL           DONG (PDMP) Reviewed for Current/Active Medications: buprenorphine/naloxone and gabapentin as reviewed today    Past Surgical History:  Past Surgical History:   Procedure Laterality Date   • APPENDECTOMY     •  SECTION     • ENDOSCOPY N/A 10/28/2022    Procedure: ESOPHAGOGASTRODUODENOSCOPY;  Surgeon: Jayro Bar MD;  Location: [facility];  Service: Gastroenterology;  Laterality: N/A;       Problem List:  Patient Active Problem List   Diagnosis   • Esophageal dysphagia   • Iron deficiency anemia, unspecified   • Malabsorption of iron       Allergy:   Allergies   Allergen Reactions   • Atomoxetine Other (See Comments)     Skin crawling-per patient   • Diphenhydramine    • Sulfamethoxazole-Trimethoprim         Current Medications:   Current Outpatient Medications   Medication Sig Dispense Refill   • albuterol sulfate  (90 Base) MCG/ACT inhaler      • atomoxetine (STRATTERA) 40 MG capsule Take 1 capsule by mouth Daily. 30 capsule 0   • buprenorphine-naloxone (SUBOXONE) 8-2 MG per SL tablet Place 2 tablets under the tongue Daily. 56 tablet 0   • cloNIDine (CATAPRES) 0.1 MG tablet Take 1 tablet by mouth 2 (Two) Times a Day As Needed (anxiety, panic attacks). 60 tablet 0   • estradiol cypionate (DEPO-ESTRADIOL) 5 MG/ML injection Inject  into the appropriate muscle as directed by prescriber Every 28 (Twenty-Eight) Days.     • FLUoxetine (PROzac) 20 MG capsule Take 1 capsule by mouth Daily. 30 capsule 0   • hydrOXYzine pamoate (VISTARIL) 25 MG capsule Take 1 capsule by mouth Every 12 (Twelve) Hours.     • ibuprofen (ADVIL,MOTRIN) 200 MG tablet Take 4 tablets by mouth Every 12 (Twelve) Hours.     • metroNIDAZOLE (FLAGYL) 500 MG tablet      • Neurontin 600 MG tablet Take 1 tablet by mouth 3 (Three) Times a Day.     • pantoprazole (PROTONIX) 40 MG EC tablet Take 1 tablet by  mouth Daily.     • valACYclovir (VALTREX) 1000 MG tablet      • naloxone (NARCAN) 4 MG/0.1ML nasal spray 1 spray into the nostril(s) as directed by provider As Needed (opiate over sedation). 1 spray in 1 nostril every 2 to 3 minutes call 911 (Patient not taking: Reported on 12/26/2023) 2 each 2     No current facility-administered medications for this visit.       Past Medical History:  Past Medical History:   Diagnosis Date   • Acid reflux    • Anemia    • Anxiety    • Substance abuse          Social History     Socioeconomic History   • Marital status: Single   Tobacco Use   • Smoking status: Every Day     Packs/day: 1.00     Years: 7.00     Additional pack years: 0.00     Total pack years: 7.00     Types: Cigarettes     Passive exposure: Current   • Smokeless tobacco: Never   Vaping Use   • Vaping Use: Never used   Substance and Sexual Activity   • Alcohol use: Not Currently   • Drug use: Not Currently     Types: Methamphetamines     Comment: per patient has been clean for about 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   • Sexual activity: Defer     Birth control/protection: Depo-provera         Family History   Problem Relation Age of Onset   • Heart disease Mother    • Heart disease Father    • Cancer Maternal Grandmother    • Cancer Maternal Grandfather          Mental Status Exam:   Hygiene:   good  Cooperation:  Cooperative  Eye Contact:  Good  Psychomotor Behavior:  Appropriate  Affect:  Appropriate  Mood: normal  Speech:  Normal  Thought Process:  Goal directed  Thought Content:  Normal  Suicidal:  None  Homicidal:  None  Hallucinations:  None  Delusion:  None  Memory:  Intact  Orientation:  Grossly intact  Reliability:  good  Insight:  Good  Judgement:  Good  Impulse Control:  Good         Review of Systems:  Review of Systems   Constitutional:  Negative for activity change, chills, diaphoresis and fatigue.   Respiratory:  Negative for apnea, cough and shortness of breath.    Cardiovascular:  Negative for chest pain,  "palpitations and leg swelling.   Gastrointestinal:  Negative for abdominal pain, constipation, diarrhea, nausea and vomiting.   Genitourinary:  Negative for difficulty urinating.   Musculoskeletal:  Negative for arthralgias.   Skin:  Negative for rash.   Neurological:  Negative for dizziness, weakness and headaches.   Psychiatric/Behavioral:  Positive for decreased concentration. Negative for agitation, self-injury, sleep disturbance and suicidal ideas. The patient is nervous/anxious.        Physical Exam:  Physical Exam  Vitals reviewed.   Constitutional:       General: She is not in acute distress.     Appearance: Normal appearance. She is not ill-appearing or toxic-appearing.   Pulmonary:      Effort: Pulmonary effort is normal.   Musculoskeletal:         General: Normal range of motion.   Neurological:      General: No focal deficit present.      Mental Status: She is alert and oriented to person, place, and time.   Psychiatric:         Attention and Perception: Attention and perception normal.         Mood and Affect: Mood normal. Mood is not anxious or depressed.         Speech: Speech normal.         Behavior: Behavior normal. Behavior is cooperative.         Thought Content: Thought content normal.         Cognition and Memory: Cognition and memory normal.         Judgment: Judgment normal.     Vital Signs:   /68   Pulse 113   Ht 165.1 cm (65\")   Wt 73.5 kg (162 lb)   BMI 26.96 kg/m²      Lab Results:   Telemedicine on 12/26/2023   Component Date Value Ref Range Status   • External Amphetamine Screen Urine 12/26/2023 Negative   Final   • External Benzodiazepine Screen Uri* 12/26/2023 Negative   Final   • External Cocaine Screen Urine 12/26/2023 Negative   Final   • External THC Screen Urine 12/26/2023 Negative   Final   • External Methadone Screen Urine 12/26/2023 Negative   Final   • External Methamphetamine Screen Ur* 12/26/2023 Negative   Final   • External Oxycodone Screen Urine 12/26/2023 " Negative   Final   • External Buprenorphine Screen Urine 12/26/2023 Positive (A)   Final   • External MDMA 12/26/2023 Negative   Final   • External Opiates Screen Urine 12/26/2023 Negative   Final   Admission on 11/29/2023, Discharged on 11/29/2023   Component Date Value Ref Range Status   • QT Interval 11/29/2023 332  ms Final   • QTC Interval 11/29/2023 430  ms Final   • Glucose 11/29/2023 89  65 - 99 mg/dL Final   • BUN 11/29/2023 7  6 - 20 mg/dL Final   • Creatinine 11/29/2023 1.04 (H)  0.57 - 1.00 mg/dL Final   • Sodium 11/29/2023 137  136 - 145 mmol/L Final   • Potassium 11/29/2023 4.5  3.5 - 5.2 mmol/L Final    Slight hemolysis detected by analyzer. Result may be falsely elevated.   • Chloride 11/29/2023 103  98 - 107 mmol/L Final   • CO2 11/29/2023 23.8  22.0 - 29.0 mmol/L Final   • Calcium 11/29/2023 9.4  8.6 - 10.5 mg/dL Final   • Total Protein 11/29/2023 7.2  6.0 - 8.5 g/dL Final   • Albumin 11/29/2023 4.4  3.5 - 5.2 g/dL Final   • ALT (SGPT) 11/29/2023 8  1 - 33 U/L Final   • AST (SGOT) 11/29/2023 19  1 - 32 U/L Final   • Alkaline Phosphatase 11/29/2023 70  39 - 117 U/L Final   • Total Bilirubin 11/29/2023 0.2  0.0 - 1.2 mg/dL Final   • Globulin 11/29/2023 2.8  gm/dL Final   • A/G Ratio 11/29/2023 1.6  g/dL Final   • BUN/Creatinine Ratio 11/29/2023 6.7 (L)  7.0 - 25.0 Final   • Anion Gap 11/29/2023 10.2  5.0 - 15.0 mmol/L Final   • eGFR 11/29/2023 73.8  >60.0 mL/min/1.73 Final   • HS Troponin T 11/29/2023 <6  <14 ng/L Final   • Extra Tube 11/29/2023 Hold for add-ons.   Final    Auto resulted.   • Extra Tube 11/29/2023 hold for add-on   Final    Auto resulted   • Extra Tube 11/29/2023 Hold for add-ons.   Final    Auto resulted.   • Extra Tube 11/29/2023 Hold for add-ons.   Final    Auto resulted   • WBC 11/29/2023 6.45  3.40 - 10.80 10*3/mm3 Final   • RBC 11/29/2023 5.32 (H)  3.77 - 5.28 10*6/mm3 Final   • Hemoglobin 11/29/2023 14.7  12.0 - 15.9 g/dL Final   • Hematocrit 11/29/2023 47.8 (H)  34.0 - 46.6 %  Final   • MCV 11/29/2023 89.8  79.0 - 97.0 fL Final   • MCH 11/29/2023 27.6  26.6 - 33.0 pg Final   • MCHC 11/29/2023 30.8 (L)  31.5 - 35.7 g/dL Final   • RDW 11/29/2023 18.6 (H)  12.3 - 15.4 % Final   • RDW-SD 11/29/2023 63.9 (H)  37.0 - 54.0 fl Final   • MPV 11/29/2023    Final    Unable to calculate.       • Platelets 11/29/2023 180  140 - 450 10*3/mm3 Final   • Neutrophil % 11/29/2023 57.7  42.7 - 76.0 % Final   • Lymphocyte % 11/29/2023 21.7  19.6 - 45.3 % Final   • Monocyte % 11/29/2023 9.1  5.0 - 12.0 % Final   • Eosinophil % 11/29/2023 9.3 (H)  0.3 - 6.2 % Final   • Basophil % 11/29/2023 1.9 (H)  0.0 - 1.5 % Final   • Immature Grans % 11/29/2023 0.3  0.0 - 0.5 % Final   • Neutrophils, Absolute 11/29/2023 3.72  1.70 - 7.00 10*3/mm3 Final   • Lymphocytes, Absolute 11/29/2023 1.40  0.70 - 3.10 10*3/mm3 Final   • Monocytes, Absolute 11/29/2023 0.59  0.10 - 0.90 10*3/mm3 Final   • Eosinophils, Absolute 11/29/2023 0.60 (H)  0.00 - 0.40 10*3/mm3 Final   • Basophils, Absolute 11/29/2023 0.12  0.00 - 0.20 10*3/mm3 Final   • Immature Grans, Absolute 11/29/2023 0.02  0.00 - 0.05 10*3/mm3 Final   • nRBC 11/29/2023 0.0  0.0 - 0.2 /100 WBC Final   • D-Dimer, Quantitative 11/29/2023 0.40  0.00 - 0.50 MCGFEU/mL Final   Telemedicine on 11/29/2023   Component Date Value Ref Range Status   • External Amphetamine Screen Urine 11/29/2023 Negative   Final   • External Benzodiazepine Screen Uri* 11/29/2023 Negative   Final   • External Cocaine Screen Urine 11/29/2023 Negative   Final   • External THC Screen Urine 11/29/2023 Negative   Final   • External Methadone Screen Urine 11/29/2023 Negative   Final   • External Methamphetamine Screen Ur* 11/29/2023 Negative   Final   • External Oxycodone Screen Urine 11/29/2023 Negative   Final   • External Buprenorphine Screen Urine 11/29/2023 Positive (A)   Final   • External MDMA 11/29/2023 Negative   Final   • External Opiates Screen Urine 11/29/2023 Negative   Final   Telemedicine on  11/01/2023   Component Date Value Ref Range Status   • External Amphetamine Screen Urine 11/01/2023 Negative   Final   • External Benzodiazepine Screen Uri* 11/01/2023 Negative   Final   • External Cocaine Screen Urine 11/01/2023 Negative   Final   • External THC Screen Urine 11/01/2023 Negative   Final   • External Methadone Screen Urine 11/01/2023 Negative   Final   • External Methamphetamine Screen Ur* 11/01/2023 Negative   Final   • External Oxycodone Screen Urine 11/01/2023 Negative   Final   • External Buprenorphine Screen Urine 11/01/2023 Positive (A)   Final   • External MDMA 11/01/2023 Negative   Final   • External Opiates Screen Urine 11/01/2023 Negative   Final   Lab on 10/19/2023   Component Date Value Ref Range Status   • Iron 10/19/2023 85  37 - 145 mcg/dL Final   • Iron Saturation (TSAT) 10/19/2023 22  20 - 50 % Final   • Transferrin 10/19/2023 255  200 - 360 mg/dL Final   • TIBC 10/19/2023 380  298 - 536 mcg/dL Final   • Ferritin 10/19/2023 50.49  13.00 - 150.00 ng/mL Final   • Glucose 10/19/2023 80  65 - 99 mg/dL Final   • BUN 10/19/2023 11  6 - 20 mg/dL Final   • Creatinine 10/19/2023 1.32 (H)  0.57 - 1.00 mg/dL Final   • Sodium 10/19/2023 140  136 - 145 mmol/L Final   • Potassium 10/19/2023 4.1  3.5 - 5.2 mmol/L Final   • Chloride 10/19/2023 107  98 - 107 mmol/L Final   • CO2 10/19/2023 21.8 (L)  22.0 - 29.0 mmol/L Final   • Calcium 10/19/2023 9.4  8.6 - 10.5 mg/dL Final   • Total Protein 10/19/2023 6.8  6.0 - 8.5 g/dL Final   • Albumin 10/19/2023 4.3  3.5 - 5.2 g/dL Final   • ALT (SGPT) 10/19/2023 10  1 - 33 U/L Final   • AST (SGOT) 10/19/2023 16  1 - 32 U/L Final   • Alkaline Phosphatase 10/19/2023 78  39 - 117 U/L Final   • Total Bilirubin 10/19/2023 0.2  0.0 - 1.2 mg/dL Final   • Globulin 10/19/2023 2.5  gm/dL Final   • A/G Ratio 10/19/2023 1.7  g/dL Final   • BUN/Creatinine Ratio 10/19/2023 8.3  7.0 - 25.0 Final   • Anion Gap 10/19/2023 11.2  5.0 - 15.0 mmol/L Final   • eGFR 10/19/2023 55.5  (L)  >60.0 mL/min/1.73 Final   • WBC 10/19/2023 5.95  3.40 - 10.80 10*3/mm3 Final   • RBC 10/19/2023 5.11  3.77 - 5.28 10*6/mm3 Final   • Hemoglobin 10/19/2023 12.4  12.0 - 15.9 g/dL Final   • Hematocrit 10/19/2023 41.1  34.0 - 46.6 % Final   • MCV 10/19/2023 80.4  79.0 - 97.0 fL Final   • MCH 10/19/2023 24.3 (L)  26.6 - 33.0 pg Final   • MCHC 10/19/2023 30.2 (L)  31.5 - 35.7 g/dL Final   • RDW 10/19/2023    Final    Unable to calculate.       • RDW-SD 10/19/2023    Final    Unable to calculate.       • MPV 10/19/2023 10.6  6.0 - 12.0 fL Final   • Platelets 10/19/2023 188  140 - 450 10*3/mm3 Final   • Neutrophil % 10/19/2023 57.3  42.7 - 76.0 % Final   • Lymphocyte % 10/19/2023 20.5  19.6 - 45.3 % Final   • Monocyte % 10/19/2023 10.1  5.0 - 12.0 % Final   • Eosinophil % 10/19/2023 9.9 (H)  0.3 - 6.2 % Final   • Basophil % 10/19/2023 1.5  0.0 - 1.5 % Final   • Immature Grans % 10/19/2023 0.7 (H)  0.0 - 0.5 % Final   • Neutrophils, Absolute 10/19/2023 3.41  1.70 - 7.00 10*3/mm3 Final   • Lymphocytes, Absolute 10/19/2023 1.22  0.70 - 3.10 10*3/mm3 Final   • Monocytes, Absolute 10/19/2023 0.60  0.10 - 0.90 10*3/mm3 Final   • Eosinophils, Absolute 10/19/2023 0.59 (H)  0.00 - 0.40 10*3/mm3 Final   • Basophils, Absolute 10/19/2023 0.09  0.00 - 0.20 10*3/mm3 Final   • Immature Grans, Absolute 10/19/2023 0.04  0.00 - 0.05 10*3/mm3 Final   • nRBC 10/19/2023 0.0  0.0 - 0.2 /100 WBC Final   Telemedicine on 10/04/2023   Component Date Value Ref Range Status   • External Amphetamine Screen Urine 10/04/2023 Negative   Final   • External Benzodiazepine Screen Uri* 10/04/2023 Negative   Final   • External Cocaine Screen Urine 10/04/2023 Negative   Final   • External THC Screen Urine 10/04/2023 Negative   Final   • External Methadone Screen Urine 10/04/2023 Negative   Final   • External Methamphetamine Screen Ur* 10/04/2023 Negative   Final   • External Oxycodone Screen Urine 10/04/2023 Negative   Final   • External Buprenorphine  Screen Urine 10/04/2023 Positive (A)   Final   • External MDMA 10/04/2023 Negative   Final   • External Opiates Screen Urine 10/04/2023 Negative   Final   Telemedicine on 09/07/2023   Component Date Value Ref Range Status   • External Amphetamine Screen Urine 09/07/2023 Negative   Final   • External Benzodiazepine Screen Uri* 09/07/2023 Negative   Final   • External Cocaine Screen Urine 09/07/2023 Negative   Final   • External THC Screen Urine 09/07/2023 Negative   Final   • External Methadone Screen Urine 09/07/2023 Negative   Final   • External Methamphetamine Screen Ur* 09/07/2023 Negative   Final   • External Oxycodone Screen Urine 09/07/2023 Negative   Final   • External Buprenorphine Screen Urine 09/07/2023 Positive (A)   Final   • External MDMA 09/07/2023 Negative   Final   • External Opiates Screen Urine 09/07/2023 Negative   Final   Lab on 09/07/2023   Component Date Value Ref Range Status   • Glucose 09/07/2023 78  65 - 99 mg/dL Final   • BUN 09/07/2023 9  6 - 20 mg/dL Final   • Creatinine 09/07/2023 0.91  0.57 - 1.00 mg/dL Final   • Sodium 09/07/2023 137  136 - 145 mmol/L Final   • Potassium 09/07/2023 4.3  3.5 - 5.2 mmol/L Final   • Chloride 09/07/2023 106  98 - 107 mmol/L Final   • CO2 09/07/2023 21.9 (L)  22.0 - 29.0 mmol/L Final   • Calcium 09/07/2023 9.3  8.6 - 10.5 mg/dL Final   • Total Protein 09/07/2023 7.4  6.0 - 8.5 g/dL Final   • Albumin 09/07/2023 4.6  3.5 - 5.2 g/dL Final   • ALT (SGPT) 09/07/2023 13  1 - 33 U/L Final   • AST (SGOT) 09/07/2023 18  1 - 32 U/L Final   • Alkaline Phosphatase 09/07/2023 78  39 - 117 U/L Final   • Total Bilirubin 09/07/2023 <0.2  0.0 - 1.2 mg/dL Final   • Globulin 09/07/2023 2.8  gm/dL Final   • A/G Ratio 09/07/2023 1.6  g/dL Final   • BUN/Creatinine Ratio 09/07/2023 9.9  7.0 - 25.0 Final   • Anion Gap 09/07/2023 9.1  5.0 - 15.0 mmol/L Final   • eGFR 09/07/2023 86.7  >60.0 mL/min/1.73 Final   • Iron 09/07/2023 12 (L)  37 - 145 mcg/dL Final   • Iron Saturation  (TSAT) 2023 2 (L)  20 - 50 % Final   • Transferrin 2023 336  200 - 360 mg/dL Final   • TIBC 2023 501  298 - 536 mcg/dL Final   • Ferritin 2023 6.38 (L)  13.00 - 150.00 ng/mL Final   • Copper 2023 148  80 - 158 ug/dL Final                                    Detection Limit = 5   • Zinc 2023 102  44 - 115 ug/dL Final                                    Detection Limit = 5   • Vitamin B-12 2023 450  211 - 946 pg/mL Final   • Folate 2023 11.10  4.78 - 24.20 ng/mL Final   • TSH 2023 1.600  0.270 - 4.200 uIU/mL Final   • Reference Lab Report 2023    Final                    Value:[facility]  [address] [place] [address]  Phone: [phone] or [phone]  Fax: [phone]  Vikas Lyon M.D., Medical Director    PATIENT NAME                                 LABORATORY NO.  [id number]   SARAY TURNER                              [id number]  [id number]  [facility]                          AGE                SEX     SSN            CLIENT REF #  [id number]   F       [id number]    [id number]  [address] [place] [address]                               REQUESTING M.D.       ATTENDING MJESSICA.        COPY TO..  SOCORRO SALAS PAUL    DATE COLLECTED        DATE RECEIVED          DATE REPORTED  2023    DIAGNOSIS:  PERIPHERAL SMEAR  Microcytic anemia with mild anisocytosis including elliptocytes.  Normal WBC count with mild eosinophilia.  Granulocytes show normal  morphology and no circulating blasts identified.  Adequate platelets with                           occasional giant platelets.      CLINICAL HISTORY:  Iron deficiency anemia, unspecified iron deficiency anemia type    CLINICAL LABORATORY DATA  WBC        7.57 x10/3/-L    RDW         18.5%  HGB        8.3g/dl          PLT         252 x10/3/-L  HCT        31.5%            LYMPHS       15.7%  MCV        68.8fL           NEUTS       67.0%  MONO        8.9%  EOS         6.2%  BASO        1.7%    PERIPHERAL SMEAR MICROSCOPIC DESCRIPTION:  Liz stained smears are reviewed microscopically. See diagnosis for details.    Professional interpretation rendered by Tiffanie Rod M.D., MPH at [facility], [address] [place] [address].    GROSS DESCRIPTION:  RECEIVED 2 SLIDES FOR REVIEW- LM 9/8/23    REVIEWED, DIAGNOSED AND ELECTRONICALLY  SIGNED BY:    Tiffanie Rod M.D., MPH  CPT CODES:        70635     • Reticulocyte % 09/07/2023 1.37  0.70 - 1.90 % Final   • Reticulocyte Absolute 09/07/2023 0.0623  0.0200 - 0.1300 10*6/mm3 Final   • Haptoglobin 09/07/2023 189  30 - 200 mg/dL Final   • Sed Rate 09/07/2023 5  0 - 20 mm/hr Final   • C-Reactive Protein 09/07/2023 <0.30  0.00 - 0.50 mg/dL Final   • LDH 09/07/2023 209  135 - 214 U/L Final   • Tissue Transglutaminase IgA 09/07/2023 <2  0 - 3 U/mL Final                                  Negative        0 -  3                                Weak Positive   4 - 10                                Positive           >10   Tissue Transglutaminase (tTG) has been identified   as the endomysial antigen.  Studies have demonstr-   ated that endomysial IgA antibodies have over 99%   specificity for gluten sensitive enteropathy.   • WBC 09/07/2023 7.57  3.40 - 10.80 10*3/mm3 Final   • RBC 09/07/2023 4.58  3.77 - 5.28 10*6/mm3 Final   • Hemoglobin 09/07/2023 8.3 (L)  12.0 - 15.9 g/dL Final   • Hematocrit 09/07/2023 31.5 (L)  34.0 - 46.6 % Final   • MCV 09/07/2023 68.8 (L)  79.0 - 97.0 fL Final   • MCH 09/07/2023 18.1 (L)  26.6 - 33.0 pg Final   • MCHC 09/07/2023 26.3 (L)  31.5 - 35.7 g/dL Final   • RDW 09/07/2023 18.5 (H)  12.3 - 15.4 % Final   • RDW-SD 09/07/2023 44.7  37.0 - 54.0 fl Final   • MPV 09/07/2023    Final    Unable to calculate.       • Platelets 09/07/2023 252  140 - 450 10*3/mm3 Final   • Neutrophil % 09/07/2023 67.0  42.7 - 76.0 % Final   •  Lymphocyte % 09/07/2023 15.7 (L)  19.6 - 45.3 % Final   • Monocyte % 09/07/2023 8.9  5.0 - 12.0 % Final   • Eosinophil % 09/07/2023 6.2  0.3 - 6.2 % Final   • Basophil % 09/07/2023 1.7 (H)  0.0 - 1.5 % Final   • Immature Grans % 09/07/2023 0.5  0.0 - 0.5 % Final   • Neutrophils, Absolute 09/07/2023 5.07  1.70 - 7.00 10*3/mm3 Final   • Lymphocytes, Absolute 09/07/2023 1.19  0.70 - 3.10 10*3/mm3 Final   • Monocytes, Absolute 09/07/2023 0.67  0.10 - 0.90 10*3/mm3 Final   • Eosinophils, Absolute 09/07/2023 0.47 (H)  0.00 - 0.40 10*3/mm3 Final   • Basophils, Absolute 09/07/2023 0.13  0.00 - 0.20 10*3/mm3 Final   • Immature Grans, Absolute 09/07/2023 0.04  0.00 - 0.05 10*3/mm3 Final   • nRBC 09/07/2023 0.0  0.0 - 0.2 /100 WBC Final   • Anisocytosis 09/07/2023 Mod/2+  None Seen Final   • Hypochromia 09/07/2023 Mod/2+  None Seen Final   • Microcytes 09/07/2023 Large/3+  None Seen Final   • Platelet Morphology 09/07/2023 Normal  Normal Final   Telemedicine on 08/10/2023   Component Date Value Ref Range Status   • External Amphetamine Screen Urine 08/10/2023 Negative   Final   • External Benzodiazepine Screen Uri* 08/10/2023 Negative   Final   • External Cocaine Screen Urine 08/10/2023 Negative   Final   • External THC Screen Urine 08/10/2023 Negative   Final   • External Methadone Screen Urine 08/10/2023 Negative   Final   • External Methamphetamine Screen Ur* 08/10/2023 Negative   Final   • External Oxycodone Screen Urine 08/10/2023 Negative   Final   • External Buprenorphine Screen Urine 08/10/2023 Positive (A)   Final   • External MDMA 08/10/2023 Negative   Final   • External Opiates Screen Urine 08/10/2023 Negative   Final   Telemedicine on 07/13/2023   Component Date Value Ref Range Status   • External Amphetamine Screen Urine 07/13/2023 Negative   Final   • External Benzodiazepine Screen Uri* 07/13/2023 Negative   Final   • External Cocaine Screen Urine 07/13/2023 Negative   Final   • External THC Screen Urine  07/13/2023 Negative   Final   • External Methadone Screen Urine 07/13/2023 Negative   Final   • External Methamphetamine Screen Ur* 07/13/2023 Negative   Final   • External Oxycodone Screen Urine 07/13/2023 Negative   Final   • External Buprenorphine Screen Urine 07/13/2023 Negative   Final   • External MDMA 07/13/2023 Negative   Final   • External Opiates Screen Urine 07/13/2023 Negative   Final   There may be more visits with results that are not included.         Assessment & Plan   Diagnoses and all orders for this visit:    1. Opioid type dependence, continuous (Primary)  -     buprenorphine-naloxone (SUBOXONE) 8-2 MG per SL tablet; Place 2 tablets under the tongue Daily.  Dispense: 56 tablet; Refill: 0    2. Medication management  -     [facility] Screen    3. Attention deficit hyperactivity disorder (ADHD), predominantly inattentive type  -     atomoxetine (STRATTERA) 40 MG capsule; Take 1 capsule by mouth Daily.  Dispense: 30 capsule; Refill: 0    4. Impaired concentration  -     atomoxetine (STRATTERA) 40 MG capsule; Take 1 capsule by mouth Daily.  Dispense: 30 capsule; Refill: 0    5. Generalized anxiety disorder  -     FLUoxetine (PROzac) 20 MG capsule; Take 1 capsule by mouth Daily.  Dispense: 30 capsule; Refill: 0  -     cloNIDine (CATAPRES) 0.1 MG tablet; Take 1 tablet by mouth 2 (Two) Times a Day As Needed (anxiety, panic attacks).  Dispense: 60 tablet; Refill: 0        Visit Diagnoses:    ICD-10-CM ICD-9-CM   1. Opioid type dependence, continuous  F11.20 304.01   2. Medication management  Z79.899 V58.69   3. Attention deficit hyperactivity disorder (ADHD), predominantly inattentive type  F90.0 314.00   4. Impaired concentration  R41.840 799.51   5. Generalized anxiety disorder  F41.1 300.02       PLAN:  Safety: No acute safety concerns  Risk Assessment: Risk of self-harm acutely is low. Risk of self-harm chronically is also low, but could be further elevated in the event of treatment noncompliance  and/or AODA.    TREATMENT PLAN/GOALS: Continue supportive psychotherapy efforts and medications as indicated. Treatment and medication options discussed during today's visit. Patient acknowledged and verbally consented to continue with current treatment plan and was educated on the importance of compliance with treatment and follow-up appointments.    MEDICATION ISSUES:  DONG reviewed as expected.  Discussed medication options and treatment plan of prescribed medication as well as the risks, benefits, and side effects including potential falls, possible impaired driving and metabolic adversities among others. Patient is agreeable to call the office with any worsening of symptoms or onset of side effects. Patient is agreeable to call 911 or go to the nearest ER should he/she begin having SI/HI. No medication side effects or related complaints today.     MEDS ORDERED DURING VISIT:  New Medications Ordered This Visit   Medications   • buprenorphine-naloxone (SUBOXONE) 8-2 MG per SL tablet     Sig: Place 2 tablets under the tongue Daily.     Dispense:  56 tablet     Refill:  0     NADEAN:[id number]   • atomoxetine (STRATTERA) 40 MG capsule     Sig: Take 1 capsule by mouth Daily.     Dispense:  30 capsule     Refill:  0   • FLUoxetine (PROzac) 20 MG capsule     Sig: Take 1 capsule by mouth Daily.     Dispense:  30 capsule     Refill:  0   • cloNIDine (CATAPRES) 0.1 MG tablet     Sig: Take 1 tablet by mouth 2 (Two) Times a Day As Needed (anxiety, panic attacks).     Dispense:  60 tablet     Refill:  0       No follow-ups on file.           This document has been electronically signed by ALMA Garcia  December 26, 2023 13:37 EST      Part of this note may be an electronic transcription/translation of spoken language to printed text using the Dragon Dictation System.

## 2024-01-22 DIAGNOSIS — F41.1 GENERALIZED ANXIETY DISORDER: ICD-10-CM

## 2024-01-22 RX ORDER — FLUOXETINE HYDROCHLORIDE 20 MG/1
20 CAPSULE ORAL DAILY
Qty: 30 CAPSULE | Refills: 0 | Status: SHIPPED | OUTPATIENT
Start: 2024-01-22 | End: 2024-01-24 | Stop reason: SDUPTHER

## 2024-01-22 RX ORDER — CLONIDINE HYDROCHLORIDE 0.1 MG/1
0.1 TABLET ORAL 2 TIMES DAILY
Qty: 180 TABLET | Refills: 0 | Status: SHIPPED | OUTPATIENT
Start: 2024-01-22

## 2024-01-24 ENCOUNTER — TELEMEDICINE (OUTPATIENT)
Dept: PSYCHIATRY | Facility: CLINIC | Age: 34
End: 2024-01-24
Payer: COMMERCIAL

## 2024-01-24 VITALS
BODY MASS INDEX: 27.22 KG/M2 | WEIGHT: 163.6 LBS | SYSTOLIC BLOOD PRESSURE: 130 MMHG | HEART RATE: 113 BPM | DIASTOLIC BLOOD PRESSURE: 85 MMHG

## 2024-01-24 DIAGNOSIS — F11.20 OPIOID TYPE DEPENDENCE, CONTINUOUS: Primary | ICD-10-CM

## 2024-01-24 DIAGNOSIS — R41.840 IMPAIRED CONCENTRATION: ICD-10-CM

## 2024-01-24 DIAGNOSIS — F90.0 ATTENTION DEFICIT HYPERACTIVITY DISORDER (ADHD), PREDOMINANTLY INATTENTIVE TYPE: ICD-10-CM

## 2024-01-24 DIAGNOSIS — Z79.899 MEDICATION MANAGEMENT: ICD-10-CM

## 2024-01-24 DIAGNOSIS — F41.1 GENERALIZED ANXIETY DISORDER: ICD-10-CM

## 2024-01-24 RX ORDER — FLUOXETINE HYDROCHLORIDE 20 MG/1
20 CAPSULE ORAL DAILY
Qty: 30 CAPSULE | Refills: 0 | Status: SHIPPED | OUTPATIENT
Start: 2024-01-24 | End: 2025-01-23

## 2024-01-24 RX ORDER — BUPRENORPHINE HYDROCHLORIDE AND NALOXONE HYDROCHLORIDE DIHYDRATE 8; 2 MG/1; MG/1
2 TABLET SUBLINGUAL DAILY
Qty: 56 TABLET | Refills: 0 | Status: SHIPPED | OUTPATIENT
Start: 2024-01-24

## 2024-01-24 RX ORDER — ATOMOXETINE 40 MG/1
40 CAPSULE ORAL DAILY
Qty: 30 CAPSULE | Refills: 0 | Status: SHIPPED | OUTPATIENT
Start: 2024-01-24 | End: 2025-01-23

## 2024-01-24 NOTE — PROGRESS NOTES
This provider is located at [facility]. The Patient is seen remotely located at the [facility] using Video. Patient is being seen via telehealth and confirm that they are in a secure environment for this session. The patient's condition being diagnosed/treated is appropriate for telemedicine. Provider identified as Xu Saeed as well as credentials APRN MSN FNP-C PUMA-JULISSA.   The client/patient gave consent to be seen remotely, and when consent is given they understand that the consent allows for patient identifiable information to be sent to a third party as needed.   They may refuse to be seen remotely at any time. The electronic data is encrypted and password protected, and the patient has been advised of the potential risks to privacy not withstanding such measures.    Chief Complaint/History of Present Illness: Follow Up buprenorphine/naloxone Medicated Assisted Treatment for Opiate Use Disorder     Patient/Client Concerns/Updates: Continue Prozac and clonidine for anxiety, Strattera for difficulty concentrating      -Patient reports she is doing well and has no pressing concerns today, reports no significant life changes since last evaluation  Family doing well and home life-uneventful  -Mood is stable with no concerning depressive or anxious episodes, no suicidal or homicidal thoughts the patient reports she is concentrating adequately    Triggers (Persons/Places/Things/EventsThought/Emotions): Anxiety and difficulty concentrating    Cravings/Substance Use: Denies cravings or use of illicit substances; patient celebrating 1 year free of illicit substances    Relapse Prevention: Counseling    Urine Drug Screen (today's visit) discussed: Positive buprenorphine, otherwise negative for substances tested    UDS Confirmation (Most recent/Resulted): Positive buprenorphine/nor-buprenorphine, no concerns for urine tampering otherwise positive gabapentin    Most recent pertinent  laboratory studies reviewed: 10/24/2022-hepatic function within normal limits, Cr WNL            DONG (PDMP) Reviewed for Current/Active Medications: buprenorphine/naloxone and gabapentin as reviewed today    Past Surgical History:  Past Surgical History:   Procedure Laterality Date   • APPENDECTOMY     •  SECTION     • ENDOSCOPY N/A 10/28/2022    Procedure: ESOPHAGOGASTRODUODENOSCOPY;  Surgeon: Jayro Bar MD;  Location: [facility];  Service: Gastroenterology;  Laterality: N/A;       Problem List:  Patient Active Problem List   Diagnosis   • Esophageal dysphagia   • Iron deficiency anemia, unspecified   • Malabsorption of iron       Allergy:   Allergies   Allergen Reactions   • Atomoxetine Other (See Comments)     Skin crawling-per patient   • Diphenhydramine    • Sulfamethoxazole-Trimethoprim         Current Medications:   Current Outpatient Medications   Medication Sig Dispense Refill   • albuterol sulfate  (90 Base) MCG/ACT inhaler      • atomoxetine (STRATTERA) 40 MG capsule Take 1 capsule by mouth Daily. 30 capsule 0   • buprenorphine-naloxone (SUBOXONE) 8-2 MG per SL tablet Place 2 tablets under the tongue Daily. 56 tablet 0   • cloNIDine (CATAPRES) 0.1 MG tablet Take 1 tablet by mouth 2 (Two) Times a Day. 180 tablet 0   • estradiol cypionate (DEPO-ESTRADIOL) 5 MG/ML injection Inject  into the appropriate muscle as directed by prescriber Every 28 (Twenty-Eight) Days.     • FLUoxetine (PROzac) 20 MG capsule Take 1 capsule by mouth Daily. 30 capsule 0   • hydrOXYzine pamoate (VISTARIL) 25 MG capsule Take 1 capsule by mouth Every 12 (Twelve) Hours.     • ibuprofen (ADVIL,MOTRIN) 200 MG tablet Take 4 tablets by mouth Every 12 (Twelve) Hours.     • metroNIDAZOLE (FLAGYL) 500 MG tablet      • Neurontin 600 MG tablet Take 1 tablet by mouth 3 (Three) Times a Day.     • pantoprazole (PROTONIX) 40 MG EC tablet Take 1 tablet by mouth Daily.     • valACYclovir (VALTREX) 1000 MG tablet      • naloxone  (NARCAN) 4 MG/0.1ML nasal spray 1 spray into the nostril(s) as directed by provider As Needed (opiate over sedation). 1 spray in 1 nostril every 2 to 3 minutes call 911 (Patient not taking: Reported on 12/26/2023) 2 each 2     No current facility-administered medications for this visit.       Past Medical History:  Past Medical History:   Diagnosis Date   • Acid reflux    • Anemia    • Anxiety    • Substance abuse          Social History     Socioeconomic History   • Marital status: Single   Tobacco Use   • Smoking status: Every Day     Packs/day: 1.00     Years: 7.00     Additional pack years: 0.00     Total pack years: 7.00     Types: Cigarettes     Passive exposure: Current   • Smokeless tobacco: Never   Vaping Use   • Vaping Use: Never used   Substance and Sexual Activity   • Alcohol use: Not Currently   • Drug use: Not Currently     Types: Methamphetamines     Comment: per patient has been clean for about 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   • Sexual activity: Defer     Birth control/protection: Depo-provera         Family History   Problem Relation Age of Onset   • Heart disease Mother    • Heart disease Father    • Cancer Maternal Grandmother    • Cancer Maternal Grandfather          Mental Status Exam:   Hygiene:   good  Cooperation:  Cooperative  Eye Contact:  Good  Psychomotor Behavior:  Appropriate  Affect:  Appropriate  Mood: normal  Speech:  Normal  Thought Process:  Goal directed  Thought Content:  Normal  Suicidal:  None  Homicidal:  None  Hallucinations:  None  Delusion:  None  Memory:  Intact  Orientation:  Grossly intact  Reliability:  good  Insight:  Good  Judgement:  Good  Impulse Control:  Good         Review of Systems:  Review of Systems   Constitutional:  Negative for activity change, chills, diaphoresis and fatigue.   Respiratory:  Negative for apnea, cough and shortness of breath.    Cardiovascular:  Negative for chest pain, palpitations and leg swelling.   Gastrointestinal:  Negative for abdominal pain,  constipation, diarrhea, nausea and vomiting.   Genitourinary:  Negative for difficulty urinating.   Musculoskeletal:  Negative for arthralgias.   Skin:  Negative for rash.   Neurological:  Negative for dizziness, weakness and headaches.   Psychiatric/Behavioral:  Positive for decreased concentration. Negative for agitation, self-injury, sleep disturbance and suicidal ideas. The patient is nervous/anxious.        Physical Exam:  Physical Exam  Vitals reviewed.   Constitutional:       General: She is not in acute distress.     Appearance: Normal appearance. She is not ill-appearing or toxic-appearing.   Pulmonary:      Effort: Pulmonary effort is normal.   Musculoskeletal:         General: Normal range of motion.   Neurological:      General: No focal deficit present.      Mental Status: She is alert and oriented to person, place, and time.   Psychiatric:         Attention and Perception: Attention and perception normal.         Mood and Affect: Mood normal. Mood is not anxious or depressed.         Speech: Speech normal.         Behavior: Behavior normal. Behavior is cooperative.         Thought Content: Thought content normal.         Cognition and Memory: Cognition and memory normal.         Judgment: Judgment normal.     Vital Signs:   /85   Pulse 113   Wt 74.2 kg (163 lb 9.6 oz)   BMI 27.22 kg/m²      Lab Results:   Telemedicine on 01/24/2024   Component Date Value Ref Range Status   • External Amphetamine Screen Urine 01/24/2024 Negative   Final   • External Benzodiazepine Screen Uri* 01/24/2024 Negative   Final   • External Cocaine Screen Urine 01/24/2024 Negative   Final   • External THC Screen Urine 01/24/2024 Negative   Final   • External Methadone Screen Urine 01/24/2024 Negative   Final   • External Methamphetamine Screen Ur* 01/24/2024 Negative   Final   • External Oxycodone Screen Urine 01/24/2024 Negative   Final   • External Buprenorphine Screen Urine 01/24/2024 Positive (A)   Final   •  External MDMA 01/24/2024 Negative   Final   • External Opiates Screen Urine 01/24/2024 Negative   Final   Telemedicine on 12/26/2023   Component Date Value Ref Range Status   • External Amphetamine Screen Urine 12/26/2023 Negative   Final   • External Benzodiazepine Screen Uri* 12/26/2023 Negative   Final   • External Cocaine Screen Urine 12/26/2023 Negative   Final   • External THC Screen Urine 12/26/2023 Negative   Final   • External Methadone Screen Urine 12/26/2023 Negative   Final   • External Methamphetamine Screen Ur* 12/26/2023 Negative   Final   • External Oxycodone Screen Urine 12/26/2023 Negative   Final   • External Buprenorphine Screen Urine 12/26/2023 Positive (A)   Final   • External MDMA 12/26/2023 Negative   Final   • External Opiates Screen Urine 12/26/2023 Negative   Final   Admission on 11/29/2023, Discharged on 11/29/2023   Component Date Value Ref Range Status   • QT Interval 11/29/2023 332  ms Final   • QTC Interval 11/29/2023 430  ms Final   • Glucose 11/29/2023 89  65 - 99 mg/dL Final   • BUN 11/29/2023 7  6 - 20 mg/dL Final   • Creatinine 11/29/2023 1.04 (H)  0.57 - 1.00 mg/dL Final   • Sodium 11/29/2023 137  136 - 145 mmol/L Final   • Potassium 11/29/2023 4.5  3.5 - 5.2 mmol/L Final    Slight hemolysis detected by analyzer. Result may be falsely elevated.   • Chloride 11/29/2023 103  98 - 107 mmol/L Final   • CO2 11/29/2023 23.8  22.0 - 29.0 mmol/L Final   • Calcium 11/29/2023 9.4  8.6 - 10.5 mg/dL Final   • Total Protein 11/29/2023 7.2  6.0 - 8.5 g/dL Final   • Albumin 11/29/2023 4.4  3.5 - 5.2 g/dL Final   • ALT (SGPT) 11/29/2023 8  1 - 33 U/L Final   • AST (SGOT) 11/29/2023 19  1 - 32 U/L Final   • Alkaline Phosphatase 11/29/2023 70  39 - 117 U/L Final   • Total Bilirubin 11/29/2023 0.2  0.0 - 1.2 mg/dL Final   • Globulin 11/29/2023 2.8  gm/dL Final   • A/G Ratio 11/29/2023 1.6  g/dL Final   • BUN/Creatinine Ratio 11/29/2023 6.7 (L)  7.0 - 25.0 Final   • Anion Gap 11/29/2023 10.2  5.0 -  15.0 mmol/L Final   • eGFR 11/29/2023 73.8  >60.0 mL/min/1.73 Final   • HS Troponin T 11/29/2023 <6  <14 ng/L Final   • Extra Tube 11/29/2023 Hold for add-ons.   Final    Auto resulted.   • Extra Tube 11/29/2023 hold for add-on   Final    Auto resulted   • Extra Tube 11/29/2023 Hold for add-ons.   Final    Auto resulted.   • Extra Tube 11/29/2023 Hold for add-ons.   Final    Auto resulted   • WBC 11/29/2023 6.45  3.40 - 10.80 10*3/mm3 Final   • RBC 11/29/2023 5.32 (H)  3.77 - 5.28 10*6/mm3 Final   • Hemoglobin 11/29/2023 14.7  12.0 - 15.9 g/dL Final   • Hematocrit 11/29/2023 47.8 (H)  34.0 - 46.6 % Final   • MCV 11/29/2023 89.8  79.0 - 97.0 fL Final   • MCH 11/29/2023 27.6  26.6 - 33.0 pg Final   • MCHC 11/29/2023 30.8 (L)  31.5 - 35.7 g/dL Final   • RDW 11/29/2023 18.6 (H)  12.3 - 15.4 % Final   • RDW-SD 11/29/2023 63.9 (H)  37.0 - 54.0 fl Final   • MPV 11/29/2023    Final    Unable to calculate.       • Platelets 11/29/2023 180  140 - 450 10*3/mm3 Final   • Neutrophil % 11/29/2023 57.7  42.7 - 76.0 % Final   • Lymphocyte % 11/29/2023 21.7  19.6 - 45.3 % Final   • Monocyte % 11/29/2023 9.1  5.0 - 12.0 % Final   • Eosinophil % 11/29/2023 9.3 (H)  0.3 - 6.2 % Final   • Basophil % 11/29/2023 1.9 (H)  0.0 - 1.5 % Final   • Immature Grans % 11/29/2023 0.3  0.0 - 0.5 % Final   • Neutrophils, Absolute 11/29/2023 3.72  1.70 - 7.00 10*3/mm3 Final   • Lymphocytes, Absolute 11/29/2023 1.40  0.70 - 3.10 10*3/mm3 Final   • Monocytes, Absolute 11/29/2023 0.59  0.10 - 0.90 10*3/mm3 Final   • Eosinophils, Absolute 11/29/2023 0.60 (H)  0.00 - 0.40 10*3/mm3 Final   • Basophils, Absolute 11/29/2023 0.12  0.00 - 0.20 10*3/mm3 Final   • Immature Grans, Absolute 11/29/2023 0.02  0.00 - 0.05 10*3/mm3 Final   • nRBC 11/29/2023 0.0  0.0 - 0.2 /100 WBC Final   • D-Dimer, Quantitative 11/29/2023 0.40  0.00 - 0.50 MCGFEU/mL Final   Telemedicine on 11/29/2023   Component Date Value Ref Range Status   • External Amphetamine Screen Urine  11/29/2023 Negative   Final   • External Benzodiazepine Screen Uri* 11/29/2023 Negative   Final   • External Cocaine Screen Urine 11/29/2023 Negative   Final   • External THC Screen Urine 11/29/2023 Negative   Final   • External Methadone Screen Urine 11/29/2023 Negative   Final   • External Methamphetamine Screen Ur* 11/29/2023 Negative   Final   • External Oxycodone Screen Urine 11/29/2023 Negative   Final   • External Buprenorphine Screen Urine 11/29/2023 Positive (A)   Final   • External MDMA 11/29/2023 Negative   Final   • External Opiates Screen Urine 11/29/2023 Negative   Final   Telemedicine on 11/01/2023   Component Date Value Ref Range Status   • External Amphetamine Screen Urine 11/01/2023 Negative   Final   • External Benzodiazepine Screen Uri* 11/01/2023 Negative   Final   • External Cocaine Screen Urine 11/01/2023 Negative   Final   • External THC Screen Urine 11/01/2023 Negative   Final   • External Methadone Screen Urine 11/01/2023 Negative   Final   • External Methamphetamine Screen Ur* 11/01/2023 Negative   Final   • External Oxycodone Screen Urine 11/01/2023 Negative   Final   • External Buprenorphine Screen Urine 11/01/2023 Positive (A)   Final   • External MDMA 11/01/2023 Negative   Final   • External Opiates Screen Urine 11/01/2023 Negative   Final   Lab on 10/19/2023   Component Date Value Ref Range Status   • Iron 10/19/2023 85  37 - 145 mcg/dL Final   • Iron Saturation (TSAT) 10/19/2023 22  20 - 50 % Final   • Transferrin 10/19/2023 255  200 - 360 mg/dL Final   • TIBC 10/19/2023 380  298 - 536 mcg/dL Final   • Ferritin 10/19/2023 50.49  13.00 - 150.00 ng/mL Final   • Glucose 10/19/2023 80  65 - 99 mg/dL Final   • BUN 10/19/2023 11  6 - 20 mg/dL Final   • Creatinine 10/19/2023 1.32 (H)  0.57 - 1.00 mg/dL Final   • Sodium 10/19/2023 140  136 - 145 mmol/L Final   • Potassium 10/19/2023 4.1  3.5 - 5.2 mmol/L Final   • Chloride 10/19/2023 107  98 - 107 mmol/L Final   • CO2 10/19/2023 21.8 (L)   22.0 - 29.0 mmol/L Final   • Calcium 10/19/2023 9.4  8.6 - 10.5 mg/dL Final   • Total Protein 10/19/2023 6.8  6.0 - 8.5 g/dL Final   • Albumin 10/19/2023 4.3  3.5 - 5.2 g/dL Final   • ALT (SGPT) 10/19/2023 10  1 - 33 U/L Final   • AST (SGOT) 10/19/2023 16  1 - 32 U/L Final   • Alkaline Phosphatase 10/19/2023 78  39 - 117 U/L Final   • Total Bilirubin 10/19/2023 0.2  0.0 - 1.2 mg/dL Final   • Globulin 10/19/2023 2.5  gm/dL Final   • A/G Ratio 10/19/2023 1.7  g/dL Final   • BUN/Creatinine Ratio 10/19/2023 8.3  7.0 - 25.0 Final   • Anion Gap 10/19/2023 11.2  5.0 - 15.0 mmol/L Final   • eGFR 10/19/2023 55.5 (L)  >60.0 mL/min/1.73 Final   • WBC 10/19/2023 5.95  3.40 - 10.80 10*3/mm3 Final   • RBC 10/19/2023 5.11  3.77 - 5.28 10*6/mm3 Final   • Hemoglobin 10/19/2023 12.4  12.0 - 15.9 g/dL Final   • Hematocrit 10/19/2023 41.1  34.0 - 46.6 % Final   • MCV 10/19/2023 80.4  79.0 - 97.0 fL Final   • MCH 10/19/2023 24.3 (L)  26.6 - 33.0 pg Final   • MCHC 10/19/2023 30.2 (L)  31.5 - 35.7 g/dL Final   • RDW 10/19/2023    Final    Unable to calculate.       • RDW-SD 10/19/2023    Final    Unable to calculate.       • MPV 10/19/2023 10.6  6.0 - 12.0 fL Final   • Platelets 10/19/2023 188  140 - 450 10*3/mm3 Final   • Neutrophil % 10/19/2023 57.3  42.7 - 76.0 % Final   • Lymphocyte % 10/19/2023 20.5  19.6 - 45.3 % Final   • Monocyte % 10/19/2023 10.1  5.0 - 12.0 % Final   • Eosinophil % 10/19/2023 9.9 (H)  0.3 - 6.2 % Final   • Basophil % 10/19/2023 1.5  0.0 - 1.5 % Final   • Immature Grans % 10/19/2023 0.7 (H)  0.0 - 0.5 % Final   • Neutrophils, Absolute 10/19/2023 3.41  1.70 - 7.00 10*3/mm3 Final   • Lymphocytes, Absolute 10/19/2023 1.22  0.70 - 3.10 10*3/mm3 Final   • Monocytes, Absolute 10/19/2023 0.60  0.10 - 0.90 10*3/mm3 Final   • Eosinophils, Absolute 10/19/2023 0.59 (H)  0.00 - 0.40 10*3/mm3 Final   • Basophils, Absolute 10/19/2023 0.09  0.00 - 0.20 10*3/mm3 Final   • Immature Grans, Absolute 10/19/2023 0.04  0.00 - 0.05  10*3/mm3 Final   • nRBC 10/19/2023 0.0  0.0 - 0.2 /100 WBC Final   Telemedicine on 10/04/2023   Component Date Value Ref Range Status   • External Amphetamine Screen Urine 10/04/2023 Negative   Final   • External Benzodiazepine Screen Uri* 10/04/2023 Negative   Final   • External Cocaine Screen Urine 10/04/2023 Negative   Final   • External THC Screen Urine 10/04/2023 Negative   Final   • External Methadone Screen Urine 10/04/2023 Negative   Final   • External Methamphetamine Screen Ur* 10/04/2023 Negative   Final   • External Oxycodone Screen Urine 10/04/2023 Negative   Final   • External Buprenorphine Screen Urine 10/04/2023 Positive (A)   Final   • External MDMA 10/04/2023 Negative   Final   • External Opiates Screen Urine 10/04/2023 Negative   Final   Telemedicine on 09/07/2023   Component Date Value Ref Range Status   • External Amphetamine Screen Urine 09/07/2023 Negative   Final   • External Benzodiazepine Screen Uri* 09/07/2023 Negative   Final   • External Cocaine Screen Urine 09/07/2023 Negative   Final   • External THC Screen Urine 09/07/2023 Negative   Final   • External Methadone Screen Urine 09/07/2023 Negative   Final   • External Methamphetamine Screen Ur* 09/07/2023 Negative   Final   • External Oxycodone Screen Urine 09/07/2023 Negative   Final   • External Buprenorphine Screen Urine 09/07/2023 Positive (A)   Final   • External MDMA 09/07/2023 Negative   Final   • External Opiates Screen Urine 09/07/2023 Negative   Final   Lab on 09/07/2023   Component Date Value Ref Range Status   • Glucose 09/07/2023 78  65 - 99 mg/dL Final   • BUN 09/07/2023 9  6 - 20 mg/dL Final   • Creatinine 09/07/2023 0.91  0.57 - 1.00 mg/dL Final   • Sodium 09/07/2023 137  136 - 145 mmol/L Final   • Potassium 09/07/2023 4.3  3.5 - 5.2 mmol/L Final   • Chloride 09/07/2023 106  98 - 107 mmol/L Final   • CO2 09/07/2023 21.9 (L)  22.0 - 29.0 mmol/L Final   • Calcium 09/07/2023 9.3  8.6 - 10.5 mg/dL Final   • Total Protein  2023 7.4  6.0 - 8.5 g/dL Final   • Albumin 2023 4.6  3.5 - 5.2 g/dL Final   • ALT (SGPT) 2023 13  1 - 33 U/L Final   • AST (SGOT) 2023 18  1 - 32 U/L Final   • Alkaline Phosphatase 2023 78  39 - 117 U/L Final   • Total Bilirubin 2023 <0.2  0.0 - 1.2 mg/dL Final   • Globulin 2023 2.8  gm/dL Final   • A/G Ratio 2023 1.6  g/dL Final   • BUN/Creatinine Ratio 2023 9.9  7.0 - 25.0 Final   • Anion Gap 2023 9.1  5.0 - 15.0 mmol/L Final   • eGFR 2023 86.7  >60.0 mL/min/1.73 Final   • Iron 2023 12 (L)  37 - 145 mcg/dL Final   • Iron Saturation (TSAT) 2023 2 (L)  20 - 50 % Final   • Transferrin 2023 336  200 - 360 mg/dL Final   • TIBC 2023 501  298 - 536 mcg/dL Final   • Ferritin 2023 6.38 (L)  13.00 - 150.00 ng/mL Final   • Copper 2023 148  80 - 158 ug/dL Final                                    Detection Limit = 5   • Zinc 2023 102  44 - 115 ug/dL Final                                    Detection Limit = 5   • Vitamin B-12 2023 450  211 - 946 pg/mL Final   • Folate 2023 11.10  4.78 - 24.20 ng/mL Final   • TSH 2023 1.600  0.270 - 4.200 uIU/mL Final   • Reference Lab Report 2023    Final                    Value:[facility]  [address] [place] [address]  Phone: [phone] or [phone]  Fax: [phone]  Vikas Lyon M.D., Medical Director    PATIENT NAME                                 LABORATORY NO.  [id number]   SARAY TURNER                              [id number]  [id number]  [facility]                          AGE                SEX     SSN            CLIENT REF #  [id number]       1990   F       [id number]    [id number]  [address] [place] [address]                               REQUESTING M.D.       ATTENDING M.D..        COPY TO..  SOCORRO SALAS PAUL    DATE COLLECTED        DATE RECEIVED          DATE  REPORTED  09/07/2023 09/08/2023 09/11/2023    DIAGNOSIS:  PERIPHERAL SMEAR  Microcytic anemia with mild anisocytosis including elliptocytes.  Normal WBC count with mild eosinophilia.  Granulocytes show normal  morphology and no circulating blasts identified.  Adequate platelets with                           occasional giant platelets.      CLINICAL HISTORY:  Iron deficiency anemia, unspecified iron deficiency anemia type    CLINICAL LABORATORY DATA  WBC        7.57 x10/3/-L    RDW         18.5%  HGB        8.3g/dl          PLT         252 x10/3/-L  HCT        31.5%            LYMPHS      15.7%  MCV        68.8fL           NEUTS       67.0%  MONO        8.9%  EOS         6.2%  BASO        1.7%    PERIPHERAL SMEAR MICROSCOPIC DESCRIPTION:  Liz stained smears are reviewed microscopically. See diagnosis for details.    Professional interpretation rendered by Tiffanie Rod M.D., MPH at [facility], [address] [place] [address].    GROSS DESCRIPTION:  RECEIVED 2 SLIDES FOR REVIEW- LM 9/8/23    REVIEWED, DIAGNOSED AND ELECTRONICALLY  SIGNED BY:    Tiffanie Rod M.D., MPH  CPT CODES:        02099     • Reticulocyte % 09/07/2023 1.37  0.70 - 1.90 % Final   • Reticulocyte Absolute 09/07/2023 0.0623  0.0200 - 0.1300 10*6/mm3 Final   • Haptoglobin 09/07/2023 189  30 - 200 mg/dL Final   • Sed Rate 09/07/2023 5  0 - 20 mm/hr Final   • C-Reactive Protein 09/07/2023 <0.30  0.00 - 0.50 mg/dL Final   • LDH 09/07/2023 209  135 - 214 U/L Final   • Tissue Transglutaminase IgA 09/07/2023 <2  0 - 3 U/mL Final                                  Negative        0 -  3                                Weak Positive   4 - 10                                Positive           >10   Tissue Transglutaminase (tTG) has been identified   as the endomysial antigen.  Studies have demonstr-   ated that endomysial IgA antibodies have over 99%   specificity for gluten sensitive enteropathy.   • WBC 09/07/2023 7.57   3.40 - 10.80 10*3/mm3 Final   • RBC 09/07/2023 4.58  3.77 - 5.28 10*6/mm3 Final   • Hemoglobin 09/07/2023 8.3 (L)  12.0 - 15.9 g/dL Final   • Hematocrit 09/07/2023 31.5 (L)  34.0 - 46.6 % Final   • MCV 09/07/2023 68.8 (L)  79.0 - 97.0 fL Final   • MCH 09/07/2023 18.1 (L)  26.6 - 33.0 pg Final   • MCHC 09/07/2023 26.3 (L)  31.5 - 35.7 g/dL Final   • RDW 09/07/2023 18.5 (H)  12.3 - 15.4 % Final   • RDW-SD 09/07/2023 44.7  37.0 - 54.0 fl Final   • MPV 09/07/2023    Final    Unable to calculate.       • Platelets 09/07/2023 252  140 - 450 10*3/mm3 Final   • Neutrophil % 09/07/2023 67.0  42.7 - 76.0 % Final   • Lymphocyte % 09/07/2023 15.7 (L)  19.6 - 45.3 % Final   • Monocyte % 09/07/2023 8.9  5.0 - 12.0 % Final   • Eosinophil % 09/07/2023 6.2  0.3 - 6.2 % Final   • Basophil % 09/07/2023 1.7 (H)  0.0 - 1.5 % Final   • Immature Grans % 09/07/2023 0.5  0.0 - 0.5 % Final   • Neutrophils, Absolute 09/07/2023 5.07  1.70 - 7.00 10*3/mm3 Final   • Lymphocytes, Absolute 09/07/2023 1.19  0.70 - 3.10 10*3/mm3 Final   • Monocytes, Absolute 09/07/2023 0.67  0.10 - 0.90 10*3/mm3 Final   • Eosinophils, Absolute 09/07/2023 0.47 (H)  0.00 - 0.40 10*3/mm3 Final   • Basophils, Absolute 09/07/2023 0.13  0.00 - 0.20 10*3/mm3 Final   • Immature Grans, Absolute 09/07/2023 0.04  0.00 - 0.05 10*3/mm3 Final   • nRBC 09/07/2023 0.0  0.0 - 0.2 /100 WBC Final   • Anisocytosis 09/07/2023 Mod/2+  None Seen Final   • Hypochromia 09/07/2023 Mod/2+  None Seen Final   • Microcytes 09/07/2023 Large/3+  None Seen Final   • Platelet Morphology 09/07/2023 Normal  Normal Final   Telemedicine on 08/10/2023   Component Date Value Ref Range Status   • External Amphetamine Screen Urine 08/10/2023 Negative   Final   • External Benzodiazepine Screen Uri* 08/10/2023 Negative   Final   • External Cocaine Screen Urine 08/10/2023 Negative   Final   • External THC Screen Urine 08/10/2023 Negative   Final   • External Methadone Screen Urine 08/10/2023 Negative   Final    • External Methamphetamine Screen Ur* 08/10/2023 Negative   Final   • External Oxycodone Screen Urine 08/10/2023 Negative   Final   • External Buprenorphine Screen Urine 08/10/2023 Positive (A)   Final   • External MDMA 08/10/2023 Negative   Final   • External Opiates Screen Urine 08/10/2023 Negative   Final   There may be more visits with results that are not included.         Assessment & Plan   Diagnoses and all orders for this visit:    1. Opioid type dependence, continuous (Primary)  -     buprenorphine-naloxone (SUBOXONE) 8-2 MG per SL tablet; Place 2 tablets under the tongue Daily.  Dispense: 56 tablet; Refill: 0    2. Medication management  -     [facility] Drug Screen    3. Attention deficit hyperactivity disorder (ADHD), predominantly inattentive type  -     atomoxetine (STRATTERA) 40 MG capsule; Take 1 capsule by mouth Daily.  Dispense: 30 capsule; Refill: 0    4. Impaired concentration  -     atomoxetine (STRATTERA) 40 MG capsule; Take 1 capsule by mouth Daily.  Dispense: 30 capsule; Refill: 0    5. Generalized anxiety disorder  -     FLUoxetine (PROzac) 20 MG capsule; Take 1 capsule by mouth Daily.  Dispense: 30 capsule; Refill: 0        Visit Diagnoses:    ICD-10-CM ICD-9-CM   1. Opioid type dependence, continuous  F11.20 304.01   2. Medication management  Z79.899 V58.69   3. Attention deficit hyperactivity disorder (ADHD), predominantly inattentive type  F90.0 314.00   4. Impaired concentration  R41.840 799.51   5. Generalized anxiety disorder  F41.1 300.02       PLAN:  Safety: No acute safety concerns  Risk Assessment: Risk of self-harm acutely is low. Risk of self-harm chronically is also low, but could be further elevated in the event of treatment noncompliance and/or AODA.    TREATMENT PLAN/GOALS: Continue supportive psychotherapy efforts and medications as indicated. Treatment and medication options discussed during today's visit. Patient acknowledged and verbally consented to continue with  current treatment plan and was educated on the importance of compliance with treatment and follow-up appointments.    MEDICATION ISSUES:  DONG reviewed as expected.  Discussed medication options and treatment plan of prescribed medication as well as the risks, benefits, and side effects including potential falls, possible impaired driving and metabolic adversities among others. Patient is agreeable to call the office with any worsening of symptoms or onset of side effects. Patient is agreeable to call 911 or go to the nearest ER should he/she begin having SI/HI. No medication side effects or related complaints today.     MEDS ORDERED DURING VISIT:  New Medications Ordered This Visit   Medications   • buprenorphine-naloxone (SUBOXONE) 8-2 MG per SL tablet     Sig: Place 2 tablets under the tongue Daily.     Dispense:  56 tablet     Refill:  0     NADEAN:[id number]   • atomoxetine (STRATTERA) 40 MG capsule     Sig: Take 1 capsule by mouth Daily.     Dispense:  30 capsule     Refill:  0   • FLUoxetine (PROzac) 20 MG capsule     Sig: Take 1 capsule by mouth Daily.     Dispense:  30 capsule     Refill:  0       No follow-ups on file.           This document has been electronically signed by ALMA Garcia  January 24, 2024 16:31 EST      Part of this note may be an electronic transcription/translation of spoken language to printed text using the Dragon Dictation System.

## 2024-02-22 ENCOUNTER — TELEMEDICINE (OUTPATIENT)
Dept: PSYCHIATRY | Facility: CLINIC | Age: 34
End: 2024-02-22
Payer: COMMERCIAL

## 2024-02-22 VITALS
SYSTOLIC BLOOD PRESSURE: 118 MMHG | DIASTOLIC BLOOD PRESSURE: 68 MMHG | BODY MASS INDEX: 27.99 KG/M2 | HEIGHT: 65 IN | HEART RATE: 97 BPM | WEIGHT: 168 LBS

## 2024-02-22 DIAGNOSIS — Z79.899 MEDICATION MANAGEMENT: ICD-10-CM

## 2024-02-22 DIAGNOSIS — F90.0 ATTENTION DEFICIT HYPERACTIVITY DISORDER (ADHD), PREDOMINANTLY INATTENTIVE TYPE: ICD-10-CM

## 2024-02-22 DIAGNOSIS — R41.840 IMPAIRED CONCENTRATION: ICD-10-CM

## 2024-02-22 DIAGNOSIS — F11.20 OPIOID TYPE DEPENDENCE, CONTINUOUS: Primary | ICD-10-CM

## 2024-02-22 DIAGNOSIS — F41.1 GENERALIZED ANXIETY DISORDER: ICD-10-CM

## 2024-02-22 RX ORDER — ATOMOXETINE 40 MG/1
40 CAPSULE ORAL DAILY
Qty: 30 CAPSULE | Refills: 0 | Status: SHIPPED | OUTPATIENT
Start: 2024-02-22 | End: 2025-02-21

## 2024-02-22 RX ORDER — FLUOXETINE HYDROCHLORIDE 20 MG/1
20 CAPSULE ORAL DAILY
Qty: 30 CAPSULE | Refills: 0 | Status: SHIPPED | OUTPATIENT
Start: 2024-02-22 | End: 2025-02-21

## 2024-02-22 RX ORDER — BUPRENORPHINE HYDROCHLORIDE AND NALOXONE HYDROCHLORIDE DIHYDRATE 8; 2 MG/1; MG/1
2 TABLET SUBLINGUAL DAILY
Qty: 56 TABLET | Refills: 0 | Status: SHIPPED | OUTPATIENT
Start: 2024-02-22

## 2024-02-22 NOTE — PROGRESS NOTES
This provider is located at [facility]. The Patient is seen remotely located at the [facility] ([facility]) using Video. Patient is being seen via telehealth and confirm that they are in a secure environment for this session. The patient's condition being diagnosed/treated is appropriate for telemedicine. Provider identified as Xu Saeed as well as credentials APRN MSN FNP-C PUMA-JULISSA.   The client/patient gave consent to be seen remotely, and when consent is given they understand that the consent allows for patient identifiable information to be sent to a third party as needed.   They may refuse to be seen remotely at any time. The electronic data is encrypted and password protected, and the patient has been advised of the potential risks to privacy not withstanding such measures.    Chief Complaint/History of Present Illness: Follow Up buprenorphine/naloxone Medicated Assisted Treatment for Opiate Use Disorder     Patient/Client Concerns/Updates: Continue Prozac and clonidine for anxiety, Strattera for difficulty concentrating    -Patient reports she is doing well and reports no significant life changes since last evaluation, overall no concerns today  -Denies depressive or anxious episodes denies concerning panic attacks; no suicidal or homicidal thoughts  -Reports she is concentrating on daily tasks with improvement       Triggers (Persons/Places/Things/Events/Thought/Emotions): Anxiety and difficulty concentrating    Cravings/Substance Use: Denies cravings or use of illicit substances    Relapse Prevention: Counseling    Urine Drug Screen (today's visit) discussed: Positive buprenorphine, otherwise negative for substances tested    UDS Confirmation (Most recent/Resulted): Positive buprenorphine/nor-buprenorphine, no concerns for urine tampering otherwise positive gabapentin    Most recent pertinent laboratory studies reviewed: 10/24/2022-hepatic function within normal limits, Cr WNL              DONG (PDMP) Reviewed for Current/Active Medications: buprenorphine/naloxone as reviewed today    Past Surgical History:  Past Surgical History:   Procedure Laterality Date   • APPENDECTOMY     •  SECTION     • ENDOSCOPY N/A 10/28/2022    Procedure: ESOPHAGOGASTRODUODENOSCOPY;  Surgeon: Jayro Bar MD;  Location: [facility];  Service: Gastroenterology;  Laterality: N/A;       Problem List:  Patient Active Problem List   Diagnosis   • Esophageal dysphagia   • Iron deficiency anemia, unspecified   • Malabsorption of iron       Allergy:   Allergies   Allergen Reactions   • Atomoxetine Other (See Comments)     Skin crawling-per patient   • Diphenhydramine    • Sulfamethoxazole-Trimethoprim         Current Medications:   Current Outpatient Medications   Medication Sig Dispense Refill   • albuterol sulfate  (90 Base) MCG/ACT inhaler      • atomoxetine (STRATTERA) 40 MG capsule TAKE 1 CAPSULE BY MOUTH DAILY 30 capsule 0   • buprenorphine-naloxone (SUBOXONE) 8-2 MG per SL tablet Place 2 tablets under the tongue Daily. 56 tablet 0   • cloNIDine (CATAPRES) 0.1 MG tablet Take 1 tablet by mouth 2 (Two) Times a Day. 180 tablet 0   • estradiol cypionate (DEPO-ESTRADIOL) 5 MG/ML injection Inject  into the appropriate muscle as directed by prescriber Every 28 (Twenty-Eight) Days.     • FLUoxetine (PROzac) 20 MG capsule TAKE 1 CAPSULE BY MOUTH DAILY 30 capsule 0   • hydrOXYzine pamoate (VISTARIL) 25 MG capsule Take 1 capsule by mouth Every 12 (Twelve) Hours.     • ibuprofen (ADVIL,MOTRIN) 200 MG tablet Take 4 tablets by mouth Every 12 (Twelve) Hours.     • metroNIDAZOLE (FLAGYL) 500 MG tablet      • naloxone (NARCAN) 4 MG/0.1ML nasal spray 1 spray into the nostril(s) as directed by provider As Needed (opiate over sedation). 1 spray in 1 nostril every 2 to 3 minutes call 911 (Patient not taking: Reported on 2023) 2 each 2   • Neurontin 600 MG tablet Take 1 tablet by mouth 3 (Three) Times a Day.     •  pantoprazole (PROTONIX) 40 MG EC tablet Take 1 tablet by mouth Daily.     • valACYclovir (VALTREX) 1000 MG tablet        No current facility-administered medications for this visit.       Past Medical History:  Past Medical History:   Diagnosis Date   • Acid reflux    • Anemia    • Anxiety    • Substance abuse          Social History     Socioeconomic History   • Marital status: Single   Tobacco Use   • Smoking status: Every Day     Packs/day: 1.00     Years: 7.00     Additional pack years: 0.00     Total pack years: 7.00     Types: Cigarettes     Passive exposure: Current   • Smokeless tobacco: Never   Vaping Use   • Vaping Use: Never used   Substance and Sexual Activity   • Alcohol use: Not Currently   • Drug use: Not Currently     Types: Methamphetamines     Comment: per patient has been clean for about 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   • Sexual activity: Defer     Birth control/protection: Depo-provera         Family History   Problem Relation Age of Onset   • Heart disease Mother    • Heart disease Father    • Cancer Maternal Grandmother    • Cancer Maternal Grandfather          Mental Status Exam:   Hygiene:   good  Cooperation:  Cooperative  Eye Contact:  Good  Psychomotor Behavior:  Appropriate  Affect:  Appropriate  Mood: normal  Speech:  Normal  Thought Process:  Goal directed  Thought Content:  Normal  Suicidal:  None  Homicidal:  None  Hallucinations:  None  Delusion:  None  Memory:  Intact  Orientation:  Grossly intact  Reliability:  good  Insight:  Good  Judgement:  Good  Impulse Control:  Good         Review of Systems:  Review of Systems   Constitutional:  Negative for activity change, chills, diaphoresis and fatigue.   Respiratory:  Negative for apnea, cough and shortness of breath.    Cardiovascular:  Negative for chest pain, palpitations and leg swelling.   Gastrointestinal:  Negative for abdominal pain, constipation, diarrhea, nausea and vomiting.   Genitourinary:  Negative for difficulty urinating.  "  Musculoskeletal:  Negative for arthralgias.   Skin:  Negative for rash.   Neurological:  Negative for dizziness, weakness and headaches.   Psychiatric/Behavioral:  Positive for decreased concentration. Negative for agitation, self-injury, sleep disturbance and suicidal ideas. The patient is nervous/anxious.        Physical Exam:  Physical Exam  Vitals reviewed.   Constitutional:       General: She is not in acute distress.     Appearance: Normal appearance. She is not ill-appearing or toxic-appearing.   Pulmonary:      Effort: Pulmonary effort is normal.   Musculoskeletal:         General: Normal range of motion.   Neurological:      General: No focal deficit present.      Mental Status: She is alert and oriented to person, place, and time.   Psychiatric:         Attention and Perception: Attention and perception normal.         Mood and Affect: Mood normal. Mood is not anxious or depressed.         Speech: Speech normal.         Behavior: Behavior normal. Behavior is cooperative.         Thought Content: Thought content normal.         Cognition and Memory: Cognition and memory normal.         Judgment: Judgment normal.     Vital Signs:   /68   Pulse 97   Ht 165.1 cm (65\")   Wt 76.2 kg (168 lb)   BMI 27.96 kg/m²      Lab Results:   Telemedicine on 02/22/2024   Component Date Value Ref Range Status   • External Amphetamine Screen Urine 02/22/2024 Negative   Final   • External Benzodiazepine Screen Uri* 02/22/2024 Negative   Final   • External Cocaine Screen Urine 02/22/2024 Negative   Final   • External THC Screen Urine 02/22/2024 Negative   Final   • External Methadone Screen Urine 02/22/2024 Negative   Final   • External Methamphetamine Screen Ur* 02/22/2024 Negative   Final   • External Oxycodone Screen Urine 02/22/2024 Negative   Final   • External Buprenorphine Screen Urine 02/22/2024 Positive (A)   Final   • External MDMA 02/22/2024 Negative   Final   • External Opiates Screen Urine 02/22/2024 " Negative   Final   Telemedicine on 01/24/2024   Component Date Value Ref Range Status   • External Amphetamine Screen Urine 01/24/2024 Negative   Final   • External Benzodiazepine Screen Uri* 01/24/2024 Negative   Final   • External Cocaine Screen Urine 01/24/2024 Negative   Final   • External THC Screen Urine 01/24/2024 Negative   Final   • External Methadone Screen Urine 01/24/2024 Negative   Final   • External Methamphetamine Screen Ur* 01/24/2024 Negative   Final   • External Oxycodone Screen Urine 01/24/2024 Negative   Final   • External Buprenorphine Screen Urine 01/24/2024 Positive (A)   Final   • External MDMA 01/24/2024 Negative   Final   • External Opiates Screen Urine 01/24/2024 Negative   Final   Telemedicine on 12/26/2023   Component Date Value Ref Range Status   • External Amphetamine Screen Urine 12/26/2023 Negative   Final   • External Benzodiazepine Screen Uri* 12/26/2023 Negative   Final   • External Cocaine Screen Urine 12/26/2023 Negative   Final   • External THC Screen Urine 12/26/2023 Negative   Final   • External Methadone Screen Urine 12/26/2023 Negative   Final   • External Methamphetamine Screen Ur* 12/26/2023 Negative   Final   • External Oxycodone Screen Urine 12/26/2023 Negative   Final   • External Buprenorphine Screen Urine 12/26/2023 Positive (A)   Final   • External MDMA 12/26/2023 Negative   Final   • External Opiates Screen Urine 12/26/2023 Negative   Final   Admission on 11/29/2023, Discharged on 11/29/2023   Component Date Value Ref Range Status   • QT Interval 11/29/2023 332  ms Final   • QTC Interval 11/29/2023 430  ms Final   • Glucose 11/29/2023 89  65 - 99 mg/dL Final   • BUN 11/29/2023 7  6 - 20 mg/dL Final   • Creatinine 11/29/2023 1.04 (H)  0.57 - 1.00 mg/dL Final   • Sodium 11/29/2023 137  136 - 145 mmol/L Final   • Potassium 11/29/2023 4.5  3.5 - 5.2 mmol/L Final    Slight hemolysis detected by analyzer. Result may be falsely elevated.   • Chloride 11/29/2023 103  98 -  107 mmol/L Final   • CO2 11/29/2023 23.8  22.0 - 29.0 mmol/L Final   • Calcium 11/29/2023 9.4  8.6 - 10.5 mg/dL Final   • Total Protein 11/29/2023 7.2  6.0 - 8.5 g/dL Final   • Albumin 11/29/2023 4.4  3.5 - 5.2 g/dL Final   • ALT (SGPT) 11/29/2023 8  1 - 33 U/L Final   • AST (SGOT) 11/29/2023 19  1 - 32 U/L Final   • Alkaline Phosphatase 11/29/2023 70  39 - 117 U/L Final   • Total Bilirubin 11/29/2023 0.2  0.0 - 1.2 mg/dL Final   • Globulin 11/29/2023 2.8  gm/dL Final   • A/G Ratio 11/29/2023 1.6  g/dL Final   • BUN/Creatinine Ratio 11/29/2023 6.7 (L)  7.0 - 25.0 Final   • Anion Gap 11/29/2023 10.2  5.0 - 15.0 mmol/L Final   • eGFR 11/29/2023 73.8  >60.0 mL/min/1.73 Final   • HS Troponin T 11/29/2023 <6  <14 ng/L Final   • Extra Tube 11/29/2023 Hold for add-ons.   Final    Auto resulted.   • Extra Tube 11/29/2023 hold for add-on   Final    Auto resulted   • Extra Tube 11/29/2023 Hold for add-ons.   Final    Auto resulted.   • Extra Tube 11/29/2023 Hold for add-ons.   Final    Auto resulted   • WBC 11/29/2023 6.45  3.40 - 10.80 10*3/mm3 Final   • RBC 11/29/2023 5.32 (H)  3.77 - 5.28 10*6/mm3 Final   • Hemoglobin 11/29/2023 14.7  12.0 - 15.9 g/dL Final   • Hematocrit 11/29/2023 47.8 (H)  34.0 - 46.6 % Final   • MCV 11/29/2023 89.8  79.0 - 97.0 fL Final   • MCH 11/29/2023 27.6  26.6 - 33.0 pg Final   • MCHC 11/29/2023 30.8 (L)  31.5 - 35.7 g/dL Final   • RDW 11/29/2023 18.6 (H)  12.3 - 15.4 % Final   • RDW-SD 11/29/2023 63.9 (H)  37.0 - 54.0 fl Final   • MPV 11/29/2023    Final    Unable to calculate.       • Platelets 11/29/2023 180  140 - 450 10*3/mm3 Final   • Neutrophil % 11/29/2023 57.7  42.7 - 76.0 % Final   • Lymphocyte % 11/29/2023 21.7  19.6 - 45.3 % Final   • Monocyte % 11/29/2023 9.1  5.0 - 12.0 % Final   • Eosinophil % 11/29/2023 9.3 (H)  0.3 - 6.2 % Final   • Basophil % 11/29/2023 1.9 (H)  0.0 - 1.5 % Final   • Immature Grans % 11/29/2023 0.3  0.0 - 0.5 % Final   • Neutrophils, Absolute 11/29/2023 3.72   1.70 - 7.00 10*3/mm3 Final   • Lymphocytes, Absolute 11/29/2023 1.40  0.70 - 3.10 10*3/mm3 Final   • Monocytes, Absolute 11/29/2023 0.59  0.10 - 0.90 10*3/mm3 Final   • Eosinophils, Absolute 11/29/2023 0.60 (H)  0.00 - 0.40 10*3/mm3 Final   • Basophils, Absolute 11/29/2023 0.12  0.00 - 0.20 10*3/mm3 Final   • Immature Grans, Absolute 11/29/2023 0.02  0.00 - 0.05 10*3/mm3 Final   • nRBC 11/29/2023 0.0  0.0 - 0.2 /100 WBC Final   • D-Dimer, Quantitative 11/29/2023 0.40  0.00 - 0.50 MCGFEU/mL Final   Telemedicine on 11/29/2023   Component Date Value Ref Range Status   • External Amphetamine Screen Urine 11/29/2023 Negative   Final   • External Benzodiazepine Screen Uri* 11/29/2023 Negative   Final   • External Cocaine Screen Urine 11/29/2023 Negative   Final   • External THC Screen Urine 11/29/2023 Negative   Final   • External Methadone Screen Urine 11/29/2023 Negative   Final   • External Methamphetamine Screen Ur* 11/29/2023 Negative   Final   • External Oxycodone Screen Urine 11/29/2023 Negative   Final   • External Buprenorphine Screen Urine 11/29/2023 Positive (A)   Final   • External MDMA 11/29/2023 Negative   Final   • External Opiates Screen Urine 11/29/2023 Negative   Final   Telemedicine on 11/01/2023   Component Date Value Ref Range Status   • External Amphetamine Screen Urine 11/01/2023 Negative   Final   • External Benzodiazepine Screen Uri* 11/01/2023 Negative   Final   • External Cocaine Screen Urine 11/01/2023 Negative   Final   • External THC Screen Urine 11/01/2023 Negative   Final   • External Methadone Screen Urine 11/01/2023 Negative   Final   • External Methamphetamine Screen Ur* 11/01/2023 Negative   Final   • External Oxycodone Screen Urine 11/01/2023 Negative   Final   • External Buprenorphine Screen Urine 11/01/2023 Positive (A)   Final   • External MDMA 11/01/2023 Negative   Final   • External Opiates Screen Urine 11/01/2023 Negative   Final   Lab on 10/19/2023   Component Date Value Ref  Range Status   • Iron 10/19/2023 85  37 - 145 mcg/dL Final   • Iron Saturation (TSAT) 10/19/2023 22  20 - 50 % Final   • Transferrin 10/19/2023 255  200 - 360 mg/dL Final   • TIBC 10/19/2023 380  298 - 536 mcg/dL Final   • Ferritin 10/19/2023 50.49  13.00 - 150.00 ng/mL Final   • Glucose 10/19/2023 80  65 - 99 mg/dL Final   • BUN 10/19/2023 11  6 - 20 mg/dL Final   • Creatinine 10/19/2023 1.32 (H)  0.57 - 1.00 mg/dL Final   • Sodium 10/19/2023 140  136 - 145 mmol/L Final   • Potassium 10/19/2023 4.1  3.5 - 5.2 mmol/L Final   • Chloride 10/19/2023 107  98 - 107 mmol/L Final   • CO2 10/19/2023 21.8 (L)  22.0 - 29.0 mmol/L Final   • Calcium 10/19/2023 9.4  8.6 - 10.5 mg/dL Final   • Total Protein 10/19/2023 6.8  6.0 - 8.5 g/dL Final   • Albumin 10/19/2023 4.3  3.5 - 5.2 g/dL Final   • ALT (SGPT) 10/19/2023 10  1 - 33 U/L Final   • AST (SGOT) 10/19/2023 16  1 - 32 U/L Final   • Alkaline Phosphatase 10/19/2023 78  39 - 117 U/L Final   • Total Bilirubin 10/19/2023 0.2  0.0 - 1.2 mg/dL Final   • Globulin 10/19/2023 2.5  gm/dL Final   • A/G Ratio 10/19/2023 1.7  g/dL Final   • BUN/Creatinine Ratio 10/19/2023 8.3  7.0 - 25.0 Final   • Anion Gap 10/19/2023 11.2  5.0 - 15.0 mmol/L Final   • eGFR 10/19/2023 55.5 (L)  >60.0 mL/min/1.73 Final   • WBC 10/19/2023 5.95  3.40 - 10.80 10*3/mm3 Final   • RBC 10/19/2023 5.11  3.77 - 5.28 10*6/mm3 Final   • Hemoglobin 10/19/2023 12.4  12.0 - 15.9 g/dL Final   • Hematocrit 10/19/2023 41.1  34.0 - 46.6 % Final   • MCV 10/19/2023 80.4  79.0 - 97.0 fL Final   • MCH 10/19/2023 24.3 (L)  26.6 - 33.0 pg Final   • MCHC 10/19/2023 30.2 (L)  31.5 - 35.7 g/dL Final   • RDW 10/19/2023    Final    Unable to calculate.       • RDW-SD 10/19/2023    Final    Unable to calculate.       • MPV 10/19/2023 10.6  6.0 - 12.0 fL Final   • Platelets 10/19/2023 188  140 - 450 10*3/mm3 Final   • Neutrophil % 10/19/2023 57.3  42.7 - 76.0 % Final   • Lymphocyte % 10/19/2023 20.5  19.6 - 45.3 % Final   • Monocyte %  10/19/2023 10.1  5.0 - 12.0 % Final   • Eosinophil % 10/19/2023 9.9 (H)  0.3 - 6.2 % Final   • Basophil % 10/19/2023 1.5  0.0 - 1.5 % Final   • Immature Grans % 10/19/2023 0.7 (H)  0.0 - 0.5 % Final   • Neutrophils, Absolute 10/19/2023 3.41  1.70 - 7.00 10*3/mm3 Final   • Lymphocytes, Absolute 10/19/2023 1.22  0.70 - 3.10 10*3/mm3 Final   • Monocytes, Absolute 10/19/2023 0.60  0.10 - 0.90 10*3/mm3 Final   • Eosinophils, Absolute 10/19/2023 0.59 (H)  0.00 - 0.40 10*3/mm3 Final   • Basophils, Absolute 10/19/2023 0.09  0.00 - 0.20 10*3/mm3 Final   • Immature Grans, Absolute 10/19/2023 0.04  0.00 - 0.05 10*3/mm3 Final   • nRBC 10/19/2023 0.0  0.0 - 0.2 /100 WBC Final   Telemedicine on 10/04/2023   Component Date Value Ref Range Status   • External Amphetamine Screen Urine 10/04/2023 Negative   Final   • External Benzodiazepine Screen Uri* 10/04/2023 Negative   Final   • External Cocaine Screen Urine 10/04/2023 Negative   Final   • External THC Screen Urine 10/04/2023 Negative   Final   • External Methadone Screen Urine 10/04/2023 Negative   Final   • External Methamphetamine Screen Ur* 10/04/2023 Negative   Final   • External Oxycodone Screen Urine 10/04/2023 Negative   Final   • External Buprenorphine Screen Urine 10/04/2023 Positive (A)   Final   • External MDMA 10/04/2023 Negative   Final   • External Opiates Screen Urine 10/04/2023 Negative   Final   Telemedicine on 09/07/2023   Component Date Value Ref Range Status   • External Amphetamine Screen Urine 09/07/2023 Negative   Final   • External Benzodiazepine Screen Uri* 09/07/2023 Negative   Final   • External Cocaine Screen Urine 09/07/2023 Negative   Final   • External THC Screen Urine 09/07/2023 Negative   Final   • External Methadone Screen Urine 09/07/2023 Negative   Final   • External Methamphetamine Screen Ur* 09/07/2023 Negative   Final   • External Oxycodone Screen Urine 09/07/2023 Negative   Final   • External Buprenorphine Screen Urine 09/07/2023 Positive  (A)   Final   • External MDMA 09/07/2023 Negative   Final   • External Opiates Screen Urine 09/07/2023 Negative   Final   Lab on 09/07/2023   Component Date Value Ref Range Status   • Glucose 09/07/2023 78  65 - 99 mg/dL Final   • BUN 09/07/2023 9  6 - 20 mg/dL Final   • Creatinine 09/07/2023 0.91  0.57 - 1.00 mg/dL Final   • Sodium 09/07/2023 137  136 - 145 mmol/L Final   • Potassium 09/07/2023 4.3  3.5 - 5.2 mmol/L Final   • Chloride 09/07/2023 106  98 - 107 mmol/L Final   • CO2 09/07/2023 21.9 (L)  22.0 - 29.0 mmol/L Final   • Calcium 09/07/2023 9.3  8.6 - 10.5 mg/dL Final   • Total Protein 09/07/2023 7.4  6.0 - 8.5 g/dL Final   • Albumin 09/07/2023 4.6  3.5 - 5.2 g/dL Final   • ALT (SGPT) 09/07/2023 13  1 - 33 U/L Final   • AST (SGOT) 09/07/2023 18  1 - 32 U/L Final   • Alkaline Phosphatase 09/07/2023 78  39 - 117 U/L Final   • Total Bilirubin 09/07/2023 <0.2  0.0 - 1.2 mg/dL Final   • Globulin 09/07/2023 2.8  gm/dL Final   • A/G Ratio 09/07/2023 1.6  g/dL Final   • BUN/Creatinine Ratio 09/07/2023 9.9  7.0 - 25.0 Final   • Anion Gap 09/07/2023 9.1  5.0 - 15.0 mmol/L Final   • eGFR 09/07/2023 86.7  >60.0 mL/min/1.73 Final   • Iron 09/07/2023 12 (L)  37 - 145 mcg/dL Final   • Iron Saturation (TSAT) 09/07/2023 2 (L)  20 - 50 % Final   • Transferrin 09/07/2023 336  200 - 360 mg/dL Final   • TIBC 09/07/2023 501  298 - 536 mcg/dL Final   • Ferritin 09/07/2023 6.38 (L)  13.00 - 150.00 ng/mL Final   • Copper 09/07/2023 148  80 - 158 ug/dL Final                                    Detection Limit = 5   • Zinc 09/07/2023 102  44 - 115 ug/dL Final                                    Detection Limit = 5   • Vitamin B-12 09/07/2023 450  211 - 946 pg/mL Final   • Folate 09/07/2023 11.10  4.78 - 24.20 ng/mL Final   • TSH 09/07/2023 1.600  0.270 - 4.200 uIU/mL Final   • Reference Lab Report 09/07/2023    Final                    Value:[facility]  [address] [id number]  Phone: [phone]  or [phone]  Fax: [phone]  Vikas Lyon M.D., Medical Director    PATIENT NAME                                 LABORATORY NO.  [id number]   SARAY TURNER                              [id number]  [id number]  [facility]                          AGE                SEX     SSN            CLIENT REF #  [id number]       1990   F       [id number]    [id number]  [address] [place] [address]                               REQUESTING M.D.       ATTENDING M.D..        COPY TO..  SOCORRO SALAS PAUL    DATE COLLECTED        DATE RECEIVED          DATE REPORTED  2023    DIAGNOSIS:  PERIPHERAL SMEAR  Microcytic anemia with mild anisocytosis including elliptocytes.  Normal WBC count with mild eosinophilia.  Granulocytes show normal  morphology and no circulating blasts identified.  Adequate platelets with                           occasional giant platelets.      CLINICAL HISTORY:  Iron deficiency anemia, unspecified iron deficiency anemia type    CLINICAL LABORATORY DATA  WBC        7.57 x10/3/-L    RDW         18.5%  HGB        8.3g/dl          PLT         252 x10/3/-L  HCT        31.5%            LYMPHS      15.7%  MCV        68.8fL           NEUTS       67.0%  MONO        8.9%  EOS         6.2%  BASO        1.7%    PERIPHERAL SMEAR MICROSCOPIC DESCRIPTION:  Liz stained smears are reviewed microscopically. See diagnosis for details.    Professional interpretation rendered by Tiffanie Rod M.D., MPH at [facility], [address] [place] [address].    GROSS DESCRIPTION:  RECEIVED 2 SLIDES FOR REVIEW- LM 23    REVIEWED, DIAGNOSED AND ELECTRONICALLY  SIGNED BY:    Tiffanie Rod M.D., MPH  CPT CODES:        46052     • Reticulocyte % 2023 1.37  0.70 - 1.90 % Final   • Reticulocyte Absolute 2023 0.0623  0.0200 - 0.1300 10*6/mm3 Final   • Haptoglobin 2023 189  30 - 200 mg/dL Final   • Sed Rate 2023 5   0 - 20 mm/hr Final   • C-Reactive Protein 09/07/2023 <0.30  0.00 - 0.50 mg/dL Final   • LDH 09/07/2023 209  135 - 214 U/L Final   • Tissue Transglutaminase IgA 09/07/2023 <2  0 - 3 U/mL Final                                  Negative        0 -  3                                Weak Positive   4 - 10                                Positive           >10   Tissue Transglutaminase (tTG) has been identified   as the endomysial antigen.  Studies have demonstr-   ated that endomysial IgA antibodies have over 99%   specificity for gluten sensitive enteropathy.   • WBC 09/07/2023 7.57  3.40 - 10.80 10*3/mm3 Final   • RBC 09/07/2023 4.58  3.77 - 5.28 10*6/mm3 Final   • Hemoglobin 09/07/2023 8.3 (L)  12.0 - 15.9 g/dL Final   • Hematocrit 09/07/2023 31.5 (L)  34.0 - 46.6 % Final   • MCV 09/07/2023 68.8 (L)  79.0 - 97.0 fL Final   • MCH 09/07/2023 18.1 (L)  26.6 - 33.0 pg Final   • MCHC 09/07/2023 26.3 (L)  31.5 - 35.7 g/dL Final   • RDW 09/07/2023 18.5 (H)  12.3 - 15.4 % Final   • RDW-SD 09/07/2023 44.7  37.0 - 54.0 fl Final   • MPV 09/07/2023    Final    Unable to calculate.       • Platelets 09/07/2023 252  140 - 450 10*3/mm3 Final   • Neutrophil % 09/07/2023 67.0  42.7 - 76.0 % Final   • Lymphocyte % 09/07/2023 15.7 (L)  19.6 - 45.3 % Final   • Monocyte % 09/07/2023 8.9  5.0 - 12.0 % Final   • Eosinophil % 09/07/2023 6.2  0.3 - 6.2 % Final   • Basophil % 09/07/2023 1.7 (H)  0.0 - 1.5 % Final   • Immature Grans % 09/07/2023 0.5  0.0 - 0.5 % Final   • Neutrophils, Absolute 09/07/2023 5.07  1.70 - 7.00 10*3/mm3 Final   • Lymphocytes, Absolute 09/07/2023 1.19  0.70 - 3.10 10*3/mm3 Final   • Monocytes, Absolute 09/07/2023 0.67  0.10 - 0.90 10*3/mm3 Final   • Eosinophils, Absolute 09/07/2023 0.47 (H)  0.00 - 0.40 10*3/mm3 Final   • Basophils, Absolute 09/07/2023 0.13  0.00 - 0.20 10*3/mm3 Final   • Immature Grans, Absolute 09/07/2023 0.04  0.00 - 0.05 10*3/mm3 Final   • nRBC 09/07/2023 0.0  0.0 - 0.2 /100 WBC Final   •  Anisocytosis 09/07/2023 Mod/2+  None Seen Final   • Hypochromia 09/07/2023 Mod/2+  None Seen Final   • Microcytes 09/07/2023 Large/3+  None Seen Final   • Platelet Morphology 09/07/2023 Normal  Normal Final   There may be more visits with results that are not included.         Assessment & Plan   Diagnoses and all orders for this visit:    1. Opioid type dependence, continuous (Primary)  -     buprenorphine-naloxone (SUBOXONE) 8-2 MG per SL tablet; Place 2 tablets under the tongue Daily.  Dispense: 56 tablet; Refill: 0    2. Medication management  -     [facility] Drug Screen        Visit Diagnoses:    ICD-10-CM ICD-9-CM   1. Opioid type dependence, continuous  F11.20 304.01   2. Medication management  Z79.899 V58.69       PLAN:  Safety: No acute safety concerns  Risk Assessment: Risk of self-harm acutely is low. Risk of self-harm chronically is also low, but could be further elevated in the event of treatment noncompliance and/or AODA.    TREATMENT PLAN/GOALS: Continue supportive psychotherapy efforts and medications as indicated. Treatment and medication options discussed during today's visit. Patient acknowledged and verbally consented to continue with current treatment plan and was educated on the importance of compliance with treatment and follow-up appointments.    MEDICATION ISSUES:  DONG reviewed as expected.  Discussed medication options and treatment plan of prescribed medication as well as the risks, benefits, and side effects including potential falls, possible impaired driving and metabolic adversities among others. Patient is agreeable to call the office with any worsening of symptoms or onset of side effects. Patient is agreeable to call 911 or go to the nearest ER should he/she begin having SI/HI. No medication side effects or related complaints today.     MEDS ORDERED DURING VISIT:  New Medications Ordered This Visit   Medications   • buprenorphine-naloxone (SUBOXONE) 8-2 MG per SL tablet     Sig:  Place 2 tablets under the tongue Daily.     Dispense:  56 tablet     Refill:  0     NADEAN:[id number]       No follow-ups on file.           This document has been electronically signed by ALMA Garcia  February 22, 2024 16:35 EST      Part of this note may be an electronic transcription/translation of spoken language to printed text using the Dragon Dictation System.

## 2024-03-21 DIAGNOSIS — F41.1 GENERALIZED ANXIETY DISORDER: ICD-10-CM

## 2024-03-21 DIAGNOSIS — R41.840 IMPAIRED CONCENTRATION: ICD-10-CM

## 2024-03-21 DIAGNOSIS — F90.0 ATTENTION DEFICIT HYPERACTIVITY DISORDER (ADHD), PREDOMINANTLY INATTENTIVE TYPE: ICD-10-CM

## 2024-03-21 RX ORDER — FLUOXETINE HYDROCHLORIDE 20 MG/1
20 CAPSULE ORAL DAILY
Qty: 30 CAPSULE | Refills: 0 | Status: SHIPPED | OUTPATIENT
Start: 2024-03-21 | End: 2025-03-21

## 2024-03-21 RX ORDER — ATOMOXETINE 40 MG/1
40 CAPSULE ORAL DAILY
Qty: 30 CAPSULE | Refills: 0 | Status: SHIPPED | OUTPATIENT
Start: 2024-03-21 | End: 2025-03-21

## 2024-03-28 ENCOUNTER — TELEMEDICINE (OUTPATIENT)
Dept: PSYCHIATRY | Facility: CLINIC | Age: 34
End: 2024-03-28
Payer: COMMERCIAL

## 2024-03-28 VITALS
BODY MASS INDEX: 27.99 KG/M2 | HEART RATE: 79 BPM | WEIGHT: 168 LBS | DIASTOLIC BLOOD PRESSURE: 79 MMHG | SYSTOLIC BLOOD PRESSURE: 113 MMHG | HEIGHT: 65 IN

## 2024-03-28 DIAGNOSIS — F11.20 OPIOID TYPE DEPENDENCE, CONTINUOUS: Primary | ICD-10-CM

## 2024-03-28 DIAGNOSIS — R41.840 IMPAIRED CONCENTRATION: ICD-10-CM

## 2024-03-28 DIAGNOSIS — F90.0 ATTENTION DEFICIT HYPERACTIVITY DISORDER (ADHD), PREDOMINANTLY INATTENTIVE TYPE: ICD-10-CM

## 2024-03-28 DIAGNOSIS — F41.1 GENERALIZED ANXIETY DISORDER: ICD-10-CM

## 2024-03-28 RX ORDER — BUPRENORPHINE HYDROCHLORIDE AND NALOXONE HYDROCHLORIDE DIHYDRATE 8; 2 MG/1; MG/1
2 TABLET SUBLINGUAL DAILY
Qty: 56 TABLET | Refills: 0 | Status: SHIPPED | OUTPATIENT
Start: 2024-03-28

## 2024-03-28 RX ORDER — FLUOXETINE HYDROCHLORIDE 40 MG/1
40 CAPSULE ORAL DAILY
Qty: 30 CAPSULE | Refills: 0 | Status: SHIPPED | OUTPATIENT
Start: 2024-03-28 | End: 2025-03-28

## 2024-03-28 RX ORDER — ATOMOXETINE 60 MG/1
60 CAPSULE ORAL DAILY
Qty: 30 CAPSULE | Refills: 0 | Status: SHIPPED | OUTPATIENT
Start: 2024-03-28 | End: 2025-03-28

## 2024-03-28 NOTE — PROGRESS NOTES
This provider is located at [facility]. The Patient is seen remotely located at the [facility] ([facility]) using Video. Patient is being seen via telehealth and confirm that they are in a secure environment for this session. The patient's condition being diagnosed/treated is appropriate for telemedicine. Provider identified as Xu Saeed as well as credentials APRN MSN FNP-C [id number].   The client/patient gave consent to be seen remotely, and when consent is given they understand that the consent allows for patient identifiable information to be sent to a third party as needed.   They may refuse to be seen remotely at any time. The electronic data is encrypted and password protected, and the patient has been advised of the potential risks to privacy not withstanding such measures.    Chief Complaint/History of Present Illness: Follow Up buprenorphine/naloxone Medicated Assisted Treatment for Opiate Use Disorder     Patient/Client Concerns/Updates: Continue Prozac and clonidine for anxiety, Strattera for difficulty concentrating  -Patient reports she is doing well and states she is taking prescribed medication most days throughout the week however continues to struggle with focus and concentration  -Increase Strattera to 60 mg daily  -Patient reports persistent generalized anxiety, increase Prozac to 40 mg daily  -Denies depressive episodes or concerns, no suicidal or homicidal thoughts  -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       Triggers (Persons/Places/Things/Events/Thought/Emotions): Anxiety and difficulty concentrating    Cravings/Substance Use: Denies cravings or use of illicit substances    Relapse Prevention: Counseling    Urine Drug Screen (today's visit) discussed: Presumptive point-of-care urinalysis not completed today due to lack of supplies however urine collected to be sent for definitive evaluation    UDS Confirmation (Most recent/Resulted): Positive  buprenorphine/nor-buprenorphine, no concerns for urine tampering otherwise positive gabapentin    Most recent pertinent laboratory studies reviewed: 10/24/2022-hepatic function within normal limits, Cr BALDOMEROL              DONG (PDMP) Reviewed for Current/Active Medications: buprenorphine/naloxone and gabapentin as reviewed today    Past Surgical History:  Past Surgical History:   Procedure Laterality Date   • APPENDECTOMY     •  SECTION     • ENDOSCOPY N/A 10/28/2022    Procedure: ESOPHAGOGASTRODUODENOSCOPY;  Surgeon: Jayro Bar MD;  Location: [facility];  Service: Gastroenterology;  Laterality: N/A;       Problem List:  Patient Active Problem List   Diagnosis   • Esophageal dysphagia   • Iron deficiency anemia, unspecified   • Malabsorption of iron       Allergy:   Allergies   Allergen Reactions   • Atomoxetine Other (See Comments)     Skin crawling-per patient   • Diphenhydramine    • Sulfamethoxazole-Trimethoprim         Current Medications:   Current Outpatient Medications   Medication Sig Dispense Refill   • albuterol sulfate  (90 Base) MCG/ACT inhaler      • atomoxetine (STRATTERA) 60 MG capsule Take 1 capsule by mouth Daily. 30 capsule 0   • buprenorphine-naloxone (SUBOXONE) 8-2 MG per SL tablet Place 2 tablets under the tongue Daily. 56 tablet 0   • cloNIDine (CATAPRES) 0.1 MG tablet Take 1 tablet by mouth 2 (Two) Times a Day. 180 tablet 0   • estradiol cypionate (DEPO-ESTRADIOL) 5 MG/ML injection Inject  into the appropriate muscle as directed by prescriber Every 28 (Twenty-Eight) Days.     • FLUoxetine (PROzac) 40 MG capsule Take 1 capsule by mouth Daily. 30 capsule 0   • hydrOXYzine pamoate (VISTARIL) 25 MG capsule Take 1 capsule by mouth Every 12 (Twelve) Hours.     • ibuprofen (ADVIL,MOTRIN) 200 MG tablet Take 4 tablets by mouth Every 12 (Twelve) Hours.     • metroNIDAZOLE (FLAGYL) 500 MG tablet      • Neurontin 600 MG tablet Take 1 tablet by mouth 3 (Three) Times a Day.     •  pantoprazole (PROTONIX) 40 MG EC tablet Take 1 tablet by mouth Daily.     • valACYclovir (VALTREX) 1000 MG tablet      • naloxone (NARCAN) 4 MG/0.1ML nasal spray 1 spray into the nostril(s) as directed by provider As Needed (opiate over sedation). 1 spray in 1 nostril every 2 to 3 minutes call 911 (Patient not taking: Reported on 3/28/2024) 2 each 2     No current facility-administered medications for this visit.       Past Medical History:  Past Medical History:   Diagnosis Date   • Acid reflux    • Anemia    • Anxiety    • Substance abuse          Social History     Socioeconomic History   • Marital status: Single   Tobacco Use   • Smoking status: Every Day     Current packs/day: 1.00     Average packs/day: 1 pack/day for 7.0 years (7.0 ttl pk-yrs)     Types: Cigarettes     Passive exposure: Current   • Smokeless tobacco: Never   Vaping Use   • Vaping status: Never Used   Substance and Sexual Activity   • Alcohol use: Not Currently   • Drug use: Not Currently     Types: Methamphetamines     Comment: per patient has been clean for about 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   • Sexual activity: Defer     Birth control/protection: Depo-provera         Family History   Problem Relation Age of Onset   • Heart disease Mother    • Heart disease Father    • Cancer Maternal Grandmother    • Cancer Maternal Grandfather          Mental Status Exam:   Hygiene:   good  Cooperation:  Cooperative  Eye Contact:  Good  Psychomotor Behavior:  Appropriate  Affect:  Appropriate  Mood: normal  Speech:  Normal  Thought Process:  Goal directed  Thought Content:  Normal  Suicidal:  None  Homicidal:  None  Hallucinations:  None  Delusion:  None  Memory:  Intact  Orientation:  Grossly intact  Reliability:  good  Insight:  Good  Judgement:  Good  Impulse Control:  Good         Review of Systems:  Review of Systems   Constitutional:  Negative for activity change, chills, diaphoresis and fatigue.   Respiratory:  Negative for apnea, cough and shortness of  "breath.    Cardiovascular:  Negative for chest pain, palpitations and leg swelling.   Gastrointestinal:  Negative for abdominal pain, constipation, diarrhea, nausea and vomiting.   Genitourinary:  Negative for difficulty urinating.   Musculoskeletal:  Negative for arthralgias.   Skin:  Negative for rash.   Neurological:  Negative for dizziness, weakness and headaches.   Psychiatric/Behavioral:  Positive for decreased concentration. Negative for agitation, self-injury, sleep disturbance and suicidal ideas. The patient is nervous/anxious.        Physical Exam:  Physical Exam  Vitals reviewed.   Constitutional:       General: She is not in acute distress.     Appearance: Normal appearance. She is not ill-appearing or toxic-appearing.   Pulmonary:      Effort: Pulmonary effort is normal.   Musculoskeletal:         General: Normal range of motion.   Neurological:      General: No focal deficit present.      Mental Status: She is alert and oriented to person, place, and time.   Psychiatric:         Attention and Perception: Attention and perception normal.         Mood and Affect: Mood normal. Mood is anxious. Mood is not depressed.         Speech: Speech normal.         Behavior: Behavior normal. Behavior is cooperative.         Thought Content: Thought content normal.         Cognition and Memory: Cognition and memory normal.         Judgment: Judgment normal.     Vital Signs:   /79   Pulse 79   Ht 165.1 cm (65\")   Wt 76.2 kg (168 lb)   BMI 27.96 kg/m²      Lab Results:   Telemedicine on 02/22/2024   Component Date Value Ref Range Status   • External Amphetamine Screen Urine 02/22/2024 Negative   Final   • External Benzodiazepine Screen Uri* 02/22/2024 Negative   Final   • External Cocaine Screen Urine 02/22/2024 Negative   Final   • External THC Screen Urine 02/22/2024 Negative   Final   • External Methadone Screen Urine 02/22/2024 Negative   Final   • External Methamphetamine Screen Ur* 02/22/2024 Negative   " Final   • External Oxycodone Screen Urine 02/22/2024 Negative   Final   • External Buprenorphine Screen Urine 02/22/2024 Positive (A)   Final   • External MDMA 02/22/2024 Negative   Final   • External Opiates Screen Urine 02/22/2024 Negative   Final   Telemedicine on 01/24/2024   Component Date Value Ref Range Status   • External Amphetamine Screen Urine 01/24/2024 Negative   Final   • External Benzodiazepine Screen Uri* 01/24/2024 Negative   Final   • External Cocaine Screen Urine 01/24/2024 Negative   Final   • External THC Screen Urine 01/24/2024 Negative   Final   • External Methadone Screen Urine 01/24/2024 Negative   Final   • External Methamphetamine Screen Ur* 01/24/2024 Negative   Final   • External Oxycodone Screen Urine 01/24/2024 Negative   Final   • External Buprenorphine Screen Urine 01/24/2024 Positive (A)   Final   • External MDMA 01/24/2024 Negative   Final   • External Opiates Screen Urine 01/24/2024 Negative   Final   Telemedicine on 12/26/2023   Component Date Value Ref Range Status   • External Amphetamine Screen Urine 12/26/2023 Negative   Final   • External Benzodiazepine Screen Uri* 12/26/2023 Negative   Final   • External Cocaine Screen Urine 12/26/2023 Negative   Final   • External THC Screen Urine 12/26/2023 Negative   Final   • External Methadone Screen Urine 12/26/2023 Negative   Final   • External Methamphetamine Screen Ur* 12/26/2023 Negative   Final   • External Oxycodone Screen Urine 12/26/2023 Negative   Final   • External Buprenorphine Screen Urine 12/26/2023 Positive (A)   Final   • External MDMA 12/26/2023 Negative   Final   • External Opiates Screen Urine 12/26/2023 Negative   Final   Admission on 11/29/2023, Discharged on 11/29/2023   Component Date Value Ref Range Status   • QT Interval 11/29/2023 332  ms Final   • QTC Interval 11/29/2023 430  ms Final   • Glucose 11/29/2023 89  65 - 99 mg/dL Final   • BUN 11/29/2023 7  6 - 20 mg/dL Final   • Creatinine 11/29/2023 1.04 (H)   0.57 - 1.00 mg/dL Final   • Sodium 11/29/2023 137  136 - 145 mmol/L Final   • Potassium 11/29/2023 4.5  3.5 - 5.2 mmol/L Final    Slight hemolysis detected by analyzer. Result may be falsely elevated.   • Chloride 11/29/2023 103  98 - 107 mmol/L Final   • CO2 11/29/2023 23.8  22.0 - 29.0 mmol/L Final   • Calcium 11/29/2023 9.4  8.6 - 10.5 mg/dL Final   • Total Protein 11/29/2023 7.2  6.0 - 8.5 g/dL Final   • Albumin 11/29/2023 4.4  3.5 - 5.2 g/dL Final   • ALT (SGPT) 11/29/2023 8  1 - 33 U/L Final   • AST (SGOT) 11/29/2023 19  1 - 32 U/L Final   • Alkaline Phosphatase 11/29/2023 70  39 - 117 U/L Final   • Total Bilirubin 11/29/2023 0.2  0.0 - 1.2 mg/dL Final   • Globulin 11/29/2023 2.8  gm/dL Final   • A/G Ratio 11/29/2023 1.6  g/dL Final   • BUN/Creatinine Ratio 11/29/2023 6.7 (L)  7.0 - 25.0 Final   • Anion Gap 11/29/2023 10.2  5.0 - 15.0 mmol/L Final   • eGFR 11/29/2023 73.8  >60.0 mL/min/1.73 Final   • HS Troponin T 11/29/2023 <6  <14 ng/L Final   • Extra Tube 11/29/2023 Hold for add-ons.   Final    Auto resulted.   • Extra Tube 11/29/2023 hold for add-on   Final    Auto resulted   • Extra Tube 11/29/2023 Hold for add-ons.   Final    Auto resulted.   • Extra Tube 11/29/2023 Hold for add-ons.   Final    Auto resulted   • WBC 11/29/2023 6.45  3.40 - 10.80 10*3/mm3 Final   • RBC 11/29/2023 5.32 (H)  3.77 - 5.28 10*6/mm3 Final   • Hemoglobin 11/29/2023 14.7  12.0 - 15.9 g/dL Final   • Hematocrit 11/29/2023 47.8 (H)  34.0 - 46.6 % Final   • MCV 11/29/2023 89.8  79.0 - 97.0 fL Final   • MCH 11/29/2023 27.6  26.6 - 33.0 pg Final   • MCHC 11/29/2023 30.8 (L)  31.5 - 35.7 g/dL Final   • RDW 11/29/2023 18.6 (H)  12.3 - 15.4 % Final   • RDW-SD 11/29/2023 63.9 (H)  37.0 - 54.0 fl Final   • MPV 11/29/2023    Final    Unable to calculate.       • Platelets 11/29/2023 180  140 - 450 10*3/mm3 Final   • Neutrophil % 11/29/2023 57.7  42.7 - 76.0 % Final   • Lymphocyte % 11/29/2023 21.7  19.6 - 45.3 % Final   • Monocyte %  11/29/2023 9.1  5.0 - 12.0 % Final   • Eosinophil % 11/29/2023 9.3 (H)  0.3 - 6.2 % Final   • Basophil % 11/29/2023 1.9 (H)  0.0 - 1.5 % Final   • Immature Grans % 11/29/2023 0.3  0.0 - 0.5 % Final   • Neutrophils, Absolute 11/29/2023 3.72  1.70 - 7.00 10*3/mm3 Final   • Lymphocytes, Absolute 11/29/2023 1.40  0.70 - 3.10 10*3/mm3 Final   • Monocytes, Absolute 11/29/2023 0.59  0.10 - 0.90 10*3/mm3 Final   • Eosinophils, Absolute 11/29/2023 0.60 (H)  0.00 - 0.40 10*3/mm3 Final   • Basophils, Absolute 11/29/2023 0.12  0.00 - 0.20 10*3/mm3 Final   • Immature Grans, Absolute 11/29/2023 0.02  0.00 - 0.05 10*3/mm3 Final   • nRBC 11/29/2023 0.0  0.0 - 0.2 /100 WBC Final   • D-Dimer, Quantitative 11/29/2023 0.40  0.00 - 0.50 MCGFEU/mL Final   Telemedicine on 11/29/2023   Component Date Value Ref Range Status   • External Amphetamine Screen Urine 11/29/2023 Negative   Final   • External Benzodiazepine Screen Uri* 11/29/2023 Negative   Final   • External Cocaine Screen Urine 11/29/2023 Negative   Final   • External THC Screen Urine 11/29/2023 Negative   Final   • External Methadone Screen Urine 11/29/2023 Negative   Final   • External Methamphetamine Screen Ur* 11/29/2023 Negative   Final   • External Oxycodone Screen Urine 11/29/2023 Negative   Final   • External Buprenorphine Screen Urine 11/29/2023 Positive (A)   Final   • External MDMA 11/29/2023 Negative   Final   • External Opiates Screen Urine 11/29/2023 Negative   Final   Telemedicine on 11/01/2023   Component Date Value Ref Range Status   • External Amphetamine Screen Urine 11/01/2023 Negative   Final   • External Benzodiazepine Screen Uri* 11/01/2023 Negative   Final   • External Cocaine Screen Urine 11/01/2023 Negative   Final   • External THC Screen Urine 11/01/2023 Negative   Final   • External Methadone Screen Urine 11/01/2023 Negative   Final   • External Methamphetamine Screen Ur* 11/01/2023 Negative   Final   • External Oxycodone Screen Urine 11/01/2023  Negative   Final   • External Buprenorphine Screen Urine 11/01/2023 Positive (A)   Final   • External MDMA 11/01/2023 Negative   Final   • External Opiates Screen Urine 11/01/2023 Negative   Final   Lab on 10/19/2023   Component Date Value Ref Range Status   • Iron 10/19/2023 85  37 - 145 mcg/dL Final   • Iron Saturation (TSAT) 10/19/2023 22  20 - 50 % Final   • Transferrin 10/19/2023 255  200 - 360 mg/dL Final   • TIBC 10/19/2023 380  298 - 536 mcg/dL Final   • Ferritin 10/19/2023 50.49  13.00 - 150.00 ng/mL Final   • Glucose 10/19/2023 80  65 - 99 mg/dL Final   • BUN 10/19/2023 11  6 - 20 mg/dL Final   • Creatinine 10/19/2023 1.32 (H)  0.57 - 1.00 mg/dL Final   • Sodium 10/19/2023 140  136 - 145 mmol/L Final   • Potassium 10/19/2023 4.1  3.5 - 5.2 mmol/L Final   • Chloride 10/19/2023 107  98 - 107 mmol/L Final   • CO2 10/19/2023 21.8 (L)  22.0 - 29.0 mmol/L Final   • Calcium 10/19/2023 9.4  8.6 - 10.5 mg/dL Final   • Total Protein 10/19/2023 6.8  6.0 - 8.5 g/dL Final   • Albumin 10/19/2023 4.3  3.5 - 5.2 g/dL Final   • ALT (SGPT) 10/19/2023 10  1 - 33 U/L Final   • AST (SGOT) 10/19/2023 16  1 - 32 U/L Final   • Alkaline Phosphatase 10/19/2023 78  39 - 117 U/L Final   • Total Bilirubin 10/19/2023 0.2  0.0 - 1.2 mg/dL Final   • Globulin 10/19/2023 2.5  gm/dL Final   • A/G Ratio 10/19/2023 1.7  g/dL Final   • BUN/Creatinine Ratio 10/19/2023 8.3  7.0 - 25.0 Final   • Anion Gap 10/19/2023 11.2  5.0 - 15.0 mmol/L Final   • eGFR 10/19/2023 55.5 (L)  >60.0 mL/min/1.73 Final   • WBC 10/19/2023 5.95  3.40 - 10.80 10*3/mm3 Final   • RBC 10/19/2023 5.11  3.77 - 5.28 10*6/mm3 Final   • Hemoglobin 10/19/2023 12.4  12.0 - 15.9 g/dL Final   • Hematocrit 10/19/2023 41.1  34.0 - 46.6 % Final   • MCV 10/19/2023 80.4  79.0 - 97.0 fL Final   • MCH 10/19/2023 24.3 (L)  26.6 - 33.0 pg Final   • MCHC 10/19/2023 30.2 (L)  31.5 - 35.7 g/dL Final   • RDW 10/19/2023    Final    Unable to calculate.       • RDW-SD 10/19/2023    Final     Unable to calculate.       • MPV 10/19/2023 10.6  6.0 - 12.0 fL Final   • Platelets 10/19/2023 188  140 - 450 10*3/mm3 Final   • Neutrophil % 10/19/2023 57.3  42.7 - 76.0 % Final   • Lymphocyte % 10/19/2023 20.5  19.6 - 45.3 % Final   • Monocyte % 10/19/2023 10.1  5.0 - 12.0 % Final   • Eosinophil % 10/19/2023 9.9 (H)  0.3 - 6.2 % Final   • Basophil % 10/19/2023 1.5  0.0 - 1.5 % Final   • Immature Grans % 10/19/2023 0.7 (H)  0.0 - 0.5 % Final   • Neutrophils, Absolute 10/19/2023 3.41  1.70 - 7.00 10*3/mm3 Final   • Lymphocytes, Absolute 10/19/2023 1.22  0.70 - 3.10 10*3/mm3 Final   • Monocytes, Absolute 10/19/2023 0.60  0.10 - 0.90 10*3/mm3 Final   • Eosinophils, Absolute 10/19/2023 0.59 (H)  0.00 - 0.40 10*3/mm3 Final   • Basophils, Absolute 10/19/2023 0.09  0.00 - 0.20 10*3/mm3 Final   • Immature Grans, Absolute 10/19/2023 0.04  0.00 - 0.05 10*3/mm3 Final   • nRBC 10/19/2023 0.0  0.0 - 0.2 /100 WBC Final   Telemedicine on 10/04/2023   Component Date Value Ref Range Status   • External Amphetamine Screen Urine 10/04/2023 Negative   Final   • External Benzodiazepine Screen Uri* 10/04/2023 Negative   Final   • External Cocaine Screen Urine 10/04/2023 Negative   Final   • External THC Screen Urine 10/04/2023 Negative   Final   • External Methadone Screen Urine 10/04/2023 Negative   Final   • External Methamphetamine Screen Ur* 10/04/2023 Negative   Final   • External Oxycodone Screen Urine 10/04/2023 Negative   Final   • External Buprenorphine Screen Urine 10/04/2023 Positive (A)   Final   • External MDMA 10/04/2023 Negative   Final   • External Opiates Screen Urine 10/04/2023 Negative   Final         Assessment & Plan   Diagnoses and all orders for this visit:    1. Opioid type dependence, continuous (Primary)  -     buprenorphine-naloxone (SUBOXONE) 8-2 MG per SL tablet; Place 2 tablets under the tongue Daily.  Dispense: 56 tablet; Refill: 0    2. Attention deficit hyperactivity disorder (ADHD), predominantly  inattentive type  -     atomoxetine (STRATTERA) 60 MG capsule; Take 1 capsule by mouth Daily.  Dispense: 30 capsule; Refill: 0    3. Impaired concentration  -     atomoxetine (STRATTERA) 60 MG capsule; Take 1 capsule by mouth Daily.  Dispense: 30 capsule; Refill: 0    4. Generalized anxiety disorder  -     FLUoxetine (PROzac) 40 MG capsule; Take 1 capsule by mouth Daily.  Dispense: 30 capsule; Refill: 0        Visit Diagnoses:    ICD-10-CM ICD-9-CM   1. Opioid type dependence, continuous  F11.20 304.01   2. Attention deficit hyperactivity disorder (ADHD), predominantly inattentive type  F90.0 314.00   3. Impaired concentration  R41.840 799.51   4. Generalized anxiety disorder  F41.1 300.02       PLAN:  Safety: No acute safety concerns  Risk Assessment: Risk of self-harm acutely is low. Risk of self-harm chronically is also low, but could be further elevated in the event of treatment noncompliance and/or AODA.    TREATMENT PLAN/GOALS: Continue supportive psychotherapy efforts and medications as indicated. Treatment and medication options discussed during today's visit. Patient acknowledged and verbally consented to continue with current treatment plan and was educated on the importance of compliance with treatment and follow-up appointments.    MEDICATION ISSUES:  DONG reviewed as expected.  Discussed medication options and treatment plan of prescribed medication as well as the risks, benefits, and side effects including potential falls, possible impaired driving and metabolic adversities among others. Patient is agreeable to call the office with any worsening of symptoms or onset of side effects. Patient is agreeable to call 911 or go to the nearest ER should he/she begin having SI/HI. No medication side effects or related complaints today.     MEDS ORDERED DURING VISIT:  New Medications Ordered This Visit   Medications   • buprenorphine-naloxone (SUBOXONE) 8-2 MG per SL tablet     Sig: Place 2 tablets under the  tongue Daily.     Dispense:  56 tablet     Refill:  0     NADEAN:[id number]   • atomoxetine (STRATTERA) 60 MG capsule     Sig: Take 1 capsule by mouth Daily.     Dispense:  30 capsule     Refill:  0   • FLUoxetine (PROzac) 40 MG capsule     Sig: Take 1 capsule by mouth Daily.     Dispense:  30 capsule     Refill:  0       No follow-ups on file.           This document has been electronically signed by ALMA Garcia  March 28, 2024 16:16 EDT      Part of this note may be an electronic transcription/translation of spoken language to printed text using the Dragon Dictation System.

## 2024-04-25 DIAGNOSIS — F41.1 GENERALIZED ANXIETY DISORDER: ICD-10-CM

## 2024-04-25 DIAGNOSIS — F90.0 ATTENTION DEFICIT HYPERACTIVITY DISORDER (ADHD), PREDOMINANTLY INATTENTIVE TYPE: ICD-10-CM

## 2024-04-25 DIAGNOSIS — R41.840 IMPAIRED CONCENTRATION: ICD-10-CM

## 2024-04-25 RX ORDER — ATOMOXETINE 60 MG/1
60 CAPSULE ORAL DAILY
Qty: 30 CAPSULE | Refills: 0 | Status: SHIPPED | OUTPATIENT
Start: 2024-04-25 | End: 2025-04-25

## 2024-04-25 RX ORDER — CLONIDINE HYDROCHLORIDE 0.1 MG/1
0.1 TABLET ORAL 2 TIMES DAILY
Qty: 180 TABLET | Refills: 0 | Status: SHIPPED | OUTPATIENT
Start: 2024-04-25

## 2024-04-30 ENCOUNTER — TELEMEDICINE (OUTPATIENT)
Dept: PSYCHIATRY | Facility: CLINIC | Age: 34
End: 2024-04-30
Payer: COMMERCIAL

## 2024-04-30 VITALS
OXYGEN SATURATION: 98 % | HEART RATE: 103 BPM | WEIGHT: 169.6 LBS | SYSTOLIC BLOOD PRESSURE: 118 MMHG | BODY MASS INDEX: 28.26 KG/M2 | HEIGHT: 65 IN | DIASTOLIC BLOOD PRESSURE: 76 MMHG

## 2024-04-30 DIAGNOSIS — F41.1 GENERALIZED ANXIETY DISORDER: ICD-10-CM

## 2024-04-30 DIAGNOSIS — F90.0 ATTENTION DEFICIT HYPERACTIVITY DISORDER (ADHD), PREDOMINANTLY INATTENTIVE TYPE: ICD-10-CM

## 2024-04-30 DIAGNOSIS — F11.20 OPIOID TYPE DEPENDENCE, CONTINUOUS: Primary | ICD-10-CM

## 2024-04-30 DIAGNOSIS — Z79.899 MEDICATION MANAGEMENT: ICD-10-CM

## 2024-04-30 DIAGNOSIS — R41.840 IMPAIRED CONCENTRATION: ICD-10-CM

## 2024-04-30 PROCEDURE — 99214 OFFICE O/P EST MOD 30 MIN: CPT | Performed by: NURSE PRACTITIONER

## 2024-04-30 PROCEDURE — 1159F MED LIST DOCD IN RCRD: CPT | Performed by: NURSE PRACTITIONER

## 2024-04-30 PROCEDURE — 1160F RVW MEDS BY RX/DR IN RCRD: CPT | Performed by: NURSE PRACTITIONER

## 2024-04-30 RX ORDER — ATOMOXETINE 60 MG/1
60 CAPSULE ORAL DAILY
Qty: 30 CAPSULE | Refills: 0 | Status: SHIPPED | OUTPATIENT
Start: 2024-04-30 | End: 2025-04-30

## 2024-04-30 RX ORDER — FLUOXETINE HYDROCHLORIDE 40 MG/1
40 CAPSULE ORAL DAILY
Qty: 30 CAPSULE | Refills: 0 | Status: SHIPPED | OUTPATIENT
Start: 2024-04-30 | End: 2025-04-30

## 2024-04-30 RX ORDER — CLINDAMYCIN HYDROCHLORIDE 300 MG/1
1 CAPSULE ORAL 3 TIMES DAILY
COMMUNITY
Start: 2024-04-16

## 2024-04-30 RX ORDER — BUPRENORPHINE HYDROCHLORIDE AND NALOXONE HYDROCHLORIDE DIHYDRATE 8; 2 MG/1; MG/1
2 TABLET SUBLINGUAL DAILY
Qty: 56 TABLET | Refills: 0 | Status: SHIPPED | OUTPATIENT
Start: 2024-04-30

## 2024-04-30 NOTE — PROGRESS NOTES
This provider is located at [facility]. The Patient is seen remotely located at the [facility] using Video. Patient is being seen via telehealth and confirm that they are in a secure environment for this session. The patient's condition being diagnosed/treated is appropriate for telemedicine. Provider identified as Xu Saeed as well as credentials APRN MSN FNP-C PUMA-JULISSA.   The client/patient gave consent to be seen remotely, and when consent is given they understand that the consent allows for patient identifiable information to be sent to a third party as needed.   They may refuse to be seen remotely at any time. The electronic data is encrypted and password protected, and the patient has been advised of the potential risks to privacy not withstanding such measures.    Chief Complaint/History of Present Illness: Follow Up buprenorphine/naloxone Medicated Assisted Treatment for Opiate Use Disorder     Patient/Client Concerns/Updates: Continue Prozac and clonidine for anxiety, Strattera for difficulty concentrating   -Patient reports she is doing well and having a good month  -Reports she is taking Strattera with increased consistency and subsequently reports improved concentration  -Fluctuating anxious symptoms however overall manageable and patient reports controlled  -Denies depressive episodes or concerns, no suicidal or homicidal thoughts no concerns with perceptual disturbances no concerns with sleep    Triggers (Persons/Places/Things/Events/Thought/Emotions): Generalized anxiety and difficulty concentrating    Cravings/Substance Use: Denies cravings or use of illicit substances    Relapse Prevention: Counseling    Urine Drug Screen (today's visit) discussed: Positive buprenorphine, otherwise negative for substances tested    UDS Confirmation (Most recent/Resulted): Positive buprenorphine/nor-buprenorphine, no concerns for urine tampering otherwise positive  gabapentin    Most recent pertinent laboratory studies reviewed: 10/24/2022-hepatic function within normal limits, Cr WNL              DONG (PDMP) Reviewed for Current/Active Medications: buprenorphine/naloxone and gabapentin as reviewed today    Past Surgical History:  Past Surgical History:   Procedure Laterality Date   • APPENDECTOMY     •  SECTION     • ENDOSCOPY N/A 10/28/2022    Procedure: ESOPHAGOGASTRODUODENOSCOPY;  Surgeon: Jayro Bar MD;  Location: [facility];  Service: Gastroenterology;  Laterality: N/A;       Problem List:  Patient Active Problem List   Diagnosis   • Esophageal dysphagia   • Iron deficiency anemia, unspecified   • Malabsorption of iron       Allergy:   Allergies   Allergen Reactions   • Atomoxetine Other (See Comments)     Skin crawling-per patient   • Diphenhydramine    • Sulfamethoxazole-Trimethoprim         Current Medications:   Current Outpatient Medications   Medication Sig Dispense Refill   • albuterol sulfate  (90 Base) MCG/ACT inhaler      • atomoxetine (STRATTERA) 60 MG capsule Take 1 capsule by mouth Daily. 30 capsule 0   • buprenorphine-naloxone (SUBOXONE) 8-2 MG per SL tablet Place 2 tablets under the tongue Daily. 56 tablet 0   • clindamycin (CLEOCIN) 300 MG capsule Take 1 capsule by mouth 3 times a day.     • cloNIDine (CATAPRES) 0.1 MG tablet TAKE 1 TABLET BY MOUTH TWICE DAILY 180 tablet 0   • Diclofenac Sodium (VOLTAREN) 1 % gel gel APPLY TOPICALLY TO THE AFFECTED AREA ON THE HANDS 3 TIMES DAILY     • estradiol cypionate (DEPO-ESTRADIOL) 5 MG/ML injection Inject  into the appropriate muscle as directed by prescriber Every 28 (Twenty-Eight) Days.     • FLUoxetine (PROzac) 40 MG capsule Take 1 capsule by mouth Daily. 30 capsule 0   • hydrOXYzine pamoate (VISTARIL) 25 MG capsule Take 1 capsule by mouth Every 12 (Twelve) Hours.     • ibuprofen (ADVIL,MOTRIN) 200 MG tablet Take 4 tablets by mouth Every 12 (Twelve) Hours.     • metroNIDAZOLE (FLAGYL) 500  MG tablet      • naloxone (NARCAN) 4 MG/0.1ML nasal spray 1 spray into the nostril(s) as directed by provider As Needed (opiate over sedation). 1 spray in 1 nostril every 2 to 3 minutes call 911 2 each 2   • Neurontin 600 MG tablet Take 1 tablet by mouth 3 (Three) Times a Day.     • pantoprazole (PROTONIX) 40 MG EC tablet Take 1 tablet by mouth Daily.     • valACYclovir (VALTREX) 1000 MG tablet        No current facility-administered medications for this visit.       Past Medical History:  Past Medical History:   Diagnosis Date   • Acid reflux    • Anemia    • Anxiety    • Substance abuse          Social History     Socioeconomic History   • Marital status: Single   Tobacco Use   • Smoking status: Every Day     Current packs/day: 1.00     Average packs/day: 1 pack/day for 7.0 years (7.0 ttl pk-yrs)     Types: Cigarettes     Passive exposure: Current   • Smokeless tobacco: Never   Vaping Use   • Vaping status: Never Used   Substance and Sexual Activity   • Alcohol use: Not Currently   • Drug use: Not Currently     Types: Methamphetamines     Comment: per patient has been clean for about 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   • Sexual activity: Defer     Birth control/protection: Depo-provera         Family History   Problem Relation Age of Onset   • Heart disease Mother    • Heart disease Father    • Cancer Maternal Grandmother    • Cancer Maternal Grandfather          Mental Status Exam:   Hygiene:   good  Cooperation:  Cooperative  Eye Contact:  Good  Psychomotor Behavior:  Appropriate  Affect:  Appropriate  Mood: normal  Speech:  Normal  Thought Process:  Goal directed  Thought Content:  Normal  Suicidal:  None  Homicidal:  None  Hallucinations:  None  Delusion:  None  Memory:  Intact  Orientation:  Grossly intact  Reliability:  good  Insight:  Good  Judgement:  Good  Impulse Control:  Good         Review of Systems:  Review of Systems   Constitutional:  Negative for activity change, chills, diaphoresis and fatigue.   Respiratory:  " Negative for apnea, cough and shortness of breath.    Cardiovascular:  Negative for chest pain, palpitations and leg swelling.   Gastrointestinal:  Negative for abdominal pain, constipation, diarrhea, nausea and vomiting.   Genitourinary:  Negative for difficulty urinating.   Musculoskeletal:  Negative for arthralgias.   Skin:  Negative for rash.   Neurological:  Negative for dizziness, weakness and headaches.   Psychiatric/Behavioral:  Positive for decreased concentration. Negative for agitation, self-injury, sleep disturbance and suicidal ideas. The patient is nervous/anxious.        Physical Exam:  Physical Exam  Vitals reviewed.   Constitutional:       General: She is not in acute distress.     Appearance: Normal appearance. She is not ill-appearing or toxic-appearing.   Pulmonary:      Effort: Pulmonary effort is normal.   Musculoskeletal:         General: Normal range of motion.   Neurological:      General: No focal deficit present.      Mental Status: She is alert and oriented to person, place, and time.   Psychiatric:         Attention and Perception: Attention and perception normal.         Mood and Affect: Mood normal. Mood is anxious. Mood is not depressed.         Speech: Speech normal.         Behavior: Behavior normal. Behavior is cooperative.         Thought Content: Thought content normal.         Cognition and Memory: Cognition and memory normal.         Judgment: Judgment normal.     Vital Signs:   /76   Pulse 103   Ht 165.1 cm (65\")   Wt 76.9 kg (169 lb 9.6 oz)   SpO2 98%   BMI 28.22 kg/m²      Lab Results:   Telemedicine on 04/30/2024   Component Date Value Ref Range Status   • External Amphetamine Screen Urine 04/30/2024 Negative   Final   • External Benzodiazepine Screen Uri* 04/30/2024 Negative   Final   • External Cocaine Screen Urine 04/30/2024 Negative   Final   • External THC Screen Urine 04/30/2024 Negative   Final   • External Methadone Screen Urine 04/30/2024 Negative   " Final   • External Methamphetamine Screen Ur* 04/30/2024 Negative   Final   • External Oxycodone Screen Urine 04/30/2024 Negative   Final   • External Buprenorphine Screen Urine 04/30/2024 Positive   Final   • External MDMA 04/30/2024 Negative   Final   • External Opiates Screen Urine 04/30/2024 Negative   Final   Telemedicine on 02/22/2024   Component Date Value Ref Range Status   • External Amphetamine Screen Urine 02/22/2024 Negative   Final   • External Benzodiazepine Screen Uri* 02/22/2024 Negative   Final   • External Cocaine Screen Urine 02/22/2024 Negative   Final   • External THC Screen Urine 02/22/2024 Negative   Final   • External Methadone Screen Urine 02/22/2024 Negative   Final   • External Methamphetamine Screen Ur* 02/22/2024 Negative   Final   • External Oxycodone Screen Urine 02/22/2024 Negative   Final   • External Buprenorphine Screen Urine 02/22/2024 Positive (A)   Final   • External MDMA 02/22/2024 Negative   Final   • External Opiates Screen Urine 02/22/2024 Negative   Final   Telemedicine on 01/24/2024   Component Date Value Ref Range Status   • External Amphetamine Screen Urine 01/24/2024 Negative   Final   • External Benzodiazepine Screen Uri* 01/24/2024 Negative   Final   • External Cocaine Screen Urine 01/24/2024 Negative   Final   • External THC Screen Urine 01/24/2024 Negative   Final   • External Methadone Screen Urine 01/24/2024 Negative   Final   • External Methamphetamine Screen Ur* 01/24/2024 Negative   Final   • External Oxycodone Screen Urine 01/24/2024 Negative   Final   • External Buprenorphine Screen Urine 01/24/2024 Positive (A)   Final   • External MDMA 01/24/2024 Negative   Final   • External Opiates Screen Urine 01/24/2024 Negative   Final   Telemedicine on 12/26/2023   Component Date Value Ref Range Status   • External Amphetamine Screen Urine 12/26/2023 Negative   Final   • External Benzodiazepine Screen Uri* 12/26/2023 Negative   Final   • External Cocaine Screen Urine  12/26/2023 Negative   Final   • External THC Screen Urine 12/26/2023 Negative   Final   • External Methadone Screen Urine 12/26/2023 Negative   Final   • External Methamphetamine Screen Ur* 12/26/2023 Negative   Final   • External Oxycodone Screen Urine 12/26/2023 Negative   Final   • External Buprenorphine Screen Urine 12/26/2023 Positive (A)   Final   • External MDMA 12/26/2023 Negative   Final   • External Opiates Screen Urine 12/26/2023 Negative   Final   Admission on 11/29/2023, Discharged on 11/29/2023   Component Date Value Ref Range Status   • QT Interval 11/29/2023 332  ms Final   • QTC Interval 11/29/2023 430  ms Final   • Glucose 11/29/2023 89  65 - 99 mg/dL Final   • BUN 11/29/2023 7  6 - 20 mg/dL Final   • Creatinine 11/29/2023 1.04 (H)  0.57 - 1.00 mg/dL Final   • Sodium 11/29/2023 137  136 - 145 mmol/L Final   • Potassium 11/29/2023 4.5  3.5 - 5.2 mmol/L Final    Slight hemolysis detected by analyzer. Result may be falsely elevated.   • Chloride 11/29/2023 103  98 - 107 mmol/L Final   • CO2 11/29/2023 23.8  22.0 - 29.0 mmol/L Final   • Calcium 11/29/2023 9.4  8.6 - 10.5 mg/dL Final   • Total Protein 11/29/2023 7.2  6.0 - 8.5 g/dL Final   • Albumin 11/29/2023 4.4  3.5 - 5.2 g/dL Final   • ALT (SGPT) 11/29/2023 8  1 - 33 U/L Final   • AST (SGOT) 11/29/2023 19  1 - 32 U/L Final   • Alkaline Phosphatase 11/29/2023 70  39 - 117 U/L Final   • Total Bilirubin 11/29/2023 0.2  0.0 - 1.2 mg/dL Final   • Globulin 11/29/2023 2.8  gm/dL Final   • A/G Ratio 11/29/2023 1.6  g/dL Final   • BUN/Creatinine Ratio 11/29/2023 6.7 (L)  7.0 - 25.0 Final   • Anion Gap 11/29/2023 10.2  5.0 - 15.0 mmol/L Final   • eGFR 11/29/2023 73.8  >60.0 mL/min/1.73 Final   • HS Troponin T 11/29/2023 <6  <14 ng/L Final   • Extra Tube 11/29/2023 Hold for add-ons.   Final    Auto resulted.   • Extra Tube 11/29/2023 hold for add-on   Final    Auto resulted   • Extra Tube 11/29/2023 Hold for add-ons.   Final    Auto resulted.   • Extra Tube  11/29/2023 Hold for add-ons.   Final    Auto resulted   • WBC 11/29/2023 6.45  3.40 - 10.80 10*3/mm3 Final   • RBC 11/29/2023 5.32 (H)  3.77 - 5.28 10*6/mm3 Final   • Hemoglobin 11/29/2023 14.7  12.0 - 15.9 g/dL Final   • Hematocrit 11/29/2023 47.8 (H)  34.0 - 46.6 % Final   • MCV 11/29/2023 89.8  79.0 - 97.0 fL Final   • MCH 11/29/2023 27.6  26.6 - 33.0 pg Final   • MCHC 11/29/2023 30.8 (L)  31.5 - 35.7 g/dL Final   • RDW 11/29/2023 18.6 (H)  12.3 - 15.4 % Final   • RDW-SD 11/29/2023 63.9 (H)  37.0 - 54.0 fl Final   • MPV 11/29/2023    Final    Unable to calculate.       • Platelets 11/29/2023 180  140 - 450 10*3/mm3 Final   • Neutrophil % 11/29/2023 57.7  42.7 - 76.0 % Final   • Lymphocyte % 11/29/2023 21.7  19.6 - 45.3 % Final   • Monocyte % 11/29/2023 9.1  5.0 - 12.0 % Final   • Eosinophil % 11/29/2023 9.3 (H)  0.3 - 6.2 % Final   • Basophil % 11/29/2023 1.9 (H)  0.0 - 1.5 % Final   • Immature Grans % 11/29/2023 0.3  0.0 - 0.5 % Final   • Neutrophils, Absolute 11/29/2023 3.72  1.70 - 7.00 10*3/mm3 Final   • Lymphocytes, Absolute 11/29/2023 1.40  0.70 - 3.10 10*3/mm3 Final   • Monocytes, Absolute 11/29/2023 0.59  0.10 - 0.90 10*3/mm3 Final   • Eosinophils, Absolute 11/29/2023 0.60 (H)  0.00 - 0.40 10*3/mm3 Final   • Basophils, Absolute 11/29/2023 0.12  0.00 - 0.20 10*3/mm3 Final   • Immature Grans, Absolute 11/29/2023 0.02  0.00 - 0.05 10*3/mm3 Final   • nRBC 11/29/2023 0.0  0.0 - 0.2 /100 WBC Final   • D-Dimer, Quantitative 11/29/2023 0.40  0.00 - 0.50 MCGFEU/mL Final   Telemedicine on 11/29/2023   Component Date Value Ref Range Status   • External Amphetamine Screen Urine 11/29/2023 Negative   Final   • External Benzodiazepine Screen Uri* 11/29/2023 Negative   Final   • External Cocaine Screen Urine 11/29/2023 Negative   Final   • External THC Screen Urine 11/29/2023 Negative   Final   • External Methadone Screen Urine 11/29/2023 Negative   Final   • External Methamphetamine Screen Ur* 11/29/2023 Negative    Final   • External Oxycodone Screen Urine 11/29/2023 Negative   Final   • External Buprenorphine Screen Urine 11/29/2023 Positive (A)   Final   • External MDMA 11/29/2023 Negative   Final   • External Opiates Screen Urine 11/29/2023 Negative   Final   Telemedicine on 11/01/2023   Component Date Value Ref Range Status   • External Amphetamine Screen Urine 11/01/2023 Negative   Final   • External Benzodiazepine Screen Uri* 11/01/2023 Negative   Final   • External Cocaine Screen Urine 11/01/2023 Negative   Final   • External THC Screen Urine 11/01/2023 Negative   Final   • External Methadone Screen Urine 11/01/2023 Negative   Final   • External Methamphetamine Screen Ur* 11/01/2023 Negative   Final   • External Oxycodone Screen Urine 11/01/2023 Negative   Final   • External Buprenorphine Screen Urine 11/01/2023 Positive (A)   Final   • External MDMA 11/01/2023 Negative   Final   • External Opiates Screen Urine 11/01/2023 Negative   Final         Assessment & Plan   Diagnoses and all orders for this visit:    1. Opioid type dependence, continuous (Primary)  -     buprenorphine-naloxone (SUBOXONE) 8-2 MG per SL tablet; Place 2 tablets under the tongue Daily.  Dispense: 56 tablet; Refill: 0    2. Medication management  -     [facility] Drug Screen    3. Attention deficit hyperactivity disorder (ADHD), predominantly inattentive type  -     atomoxetine (STRATTERA) 60 MG capsule; Take 1 capsule by mouth Daily.  Dispense: 30 capsule; Refill: 0    4. Impaired concentration  -     atomoxetine (STRATTERA) 60 MG capsule; Take 1 capsule by mouth Daily.  Dispense: 30 capsule; Refill: 0    5. Generalized anxiety disorder  -     FLUoxetine (PROzac) 40 MG capsule; Take 1 capsule by mouth Daily.  Dispense: 30 capsule; Refill: 0        Visit Diagnoses:    ICD-10-CM ICD-9-CM   1. Opioid type dependence, continuous  F11.20 304.01   2. Medication management  Z79.899 V58.69   3. Attention deficit hyperactivity disorder (ADHD), predominantly  inattentive type  F90.0 314.00   4. Impaired concentration  R41.840 799.51   5. Generalized anxiety disorder  F41.1 300.02       PLAN:  Safety: No acute safety concerns  Risk Assessment: Risk of self-harm acutely is low. Risk of self-harm chronically is also low, but could be further elevated in the event of treatment noncompliance and/or AODA.    TREATMENT PLAN/GOALS: Continue supportive psychotherapy efforts and medications as indicated. Treatment and medication options discussed during today's visit. Patient acknowledged and verbally consented to continue with current treatment plan and was educated on the importance of compliance with treatment and follow-up appointments.    MEDICATION ISSUES:  DONG reviewed as expected.  Discussed medication options and treatment plan of prescribed medication as well as the risks, benefits, and side effects including potential falls, possible impaired driving and metabolic adversities among others. Patient is agreeable to call the office with any worsening of symptoms or onset of side effects. Patient is agreeable to call 911 or go to the nearest ER should he/she begin having SI/HI. No medication side effects or related complaints today.     MEDS ORDERED DURING VISIT:  New Medications Ordered This Visit   Medications   • buprenorphine-naloxone (SUBOXONE) 8-2 MG per SL tablet     Sig: Place 2 tablets under the tongue Daily.     Dispense:  56 tablet     Refill:  0     NADEAN:[id number]   • atomoxetine (STRATTERA) 60 MG capsule     Sig: Take 1 capsule by mouth Daily.     Dispense:  30 capsule     Refill:  0   • FLUoxetine (PROzac) 40 MG capsule     Sig: Take 1 capsule by mouth Daily.     Dispense:  30 capsule     Refill:  0       No follow-ups on file.           This document has been electronically signed by ALMA Garcia  April 30, 2024 09:55 EDT      Part of this note may be an electronic transcription/translation of spoken language to printed text using the Dragon  Dictation System.

## 2024-05-31 DIAGNOSIS — R41.840 IMPAIRED CONCENTRATION: ICD-10-CM

## 2024-05-31 DIAGNOSIS — F90.0 ATTENTION DEFICIT HYPERACTIVITY DISORDER (ADHD), PREDOMINANTLY INATTENTIVE TYPE: ICD-10-CM

## 2024-06-03 RX ORDER — ATOMOXETINE 60 MG/1
60 CAPSULE ORAL DAILY
Qty: 30 CAPSULE | Refills: 0 | Status: SHIPPED | OUTPATIENT
Start: 2024-06-03 | End: 2024-06-06 | Stop reason: SDUPTHER

## 2024-06-06 ENCOUNTER — TELEMEDICINE (OUTPATIENT)
Dept: PSYCHIATRY | Facility: CLINIC | Age: 34
End: 2024-06-06
Payer: COMMERCIAL

## 2024-06-06 VITALS
HEART RATE: 104 BPM | DIASTOLIC BLOOD PRESSURE: 82 MMHG | WEIGHT: 166.2 LBS | HEIGHT: 65 IN | SYSTOLIC BLOOD PRESSURE: 131 MMHG | BODY MASS INDEX: 27.69 KG/M2

## 2024-06-06 DIAGNOSIS — M79.604 LEG PAIN, BILATERAL: ICD-10-CM

## 2024-06-06 DIAGNOSIS — F11.20 OPIOID TYPE DEPENDENCE, CONTINUOUS: Primary | ICD-10-CM

## 2024-06-06 DIAGNOSIS — F51.01 PRIMARY INSOMNIA: ICD-10-CM

## 2024-06-06 DIAGNOSIS — M79.605 LEG PAIN, BILATERAL: ICD-10-CM

## 2024-06-06 DIAGNOSIS — F90.0 ATTENTION DEFICIT HYPERACTIVITY DISORDER (ADHD), PREDOMINANTLY INATTENTIVE TYPE: ICD-10-CM

## 2024-06-06 DIAGNOSIS — R41.840 IMPAIRED CONCENTRATION: ICD-10-CM

## 2024-06-06 DIAGNOSIS — Z79.899 MEDICATION MANAGEMENT: ICD-10-CM

## 2024-06-06 DIAGNOSIS — F41.1 GENERALIZED ANXIETY DISORDER: ICD-10-CM

## 2024-06-06 LAB
EXTERNAL AMPHETAMINE SCREEN URINE: NEGATIVE
EXTERNAL BENZODIAZEPINE SCREEN URINE: POSITIVE
EXTERNAL BUPRENORPHINE SCREEN URINE: POSITIVE
EXTERNAL COCAINE SCREEN URINE: NEGATIVE
EXTERNAL MDMA: NEGATIVE
EXTERNAL METHADONE SCREEN URINE: NEGATIVE
EXTERNAL METHAMPHETAMINE SCREEN URINE: NEGATIVE
EXTERNAL OPIATES SCREEN URINE: NEGATIVE
EXTERNAL OXYCODONE SCREEN URINE: NEGATIVE
EXTERNAL THC SCREEN URINE: NEGATIVE

## 2024-06-06 RX ORDER — GABAPENTIN 600 MG
600 TABLET ORAL 4 TIMES DAILY
Qty: 120 TABLET | Refills: 0 | Status: SHIPPED | OUTPATIENT
Start: 2024-06-06

## 2024-06-06 RX ORDER — CLONIDINE HYDROCHLORIDE 0.1 MG/1
0.1 TABLET ORAL 2 TIMES DAILY
Qty: 180 TABLET | Refills: 0 | Status: SHIPPED | OUTPATIENT
Start: 2024-06-06

## 2024-06-06 RX ORDER — QUETIAPINE FUMARATE 25 MG/1
25 TABLET, FILM COATED ORAL NIGHTLY
Qty: 30 TABLET | Refills: 0 | Status: SHIPPED | OUTPATIENT
Start: 2024-06-06

## 2024-06-06 RX ORDER — ATOMOXETINE 60 MG/1
60 CAPSULE ORAL DAILY
Qty: 30 CAPSULE | Refills: 0 | Status: SHIPPED | OUTPATIENT
Start: 2024-06-06 | End: 2025-06-06

## 2024-06-06 RX ORDER — BUPRENORPHINE HYDROCHLORIDE AND NALOXONE HYDROCHLORIDE DIHYDRATE 8; 2 MG/1; MG/1
2 TABLET SUBLINGUAL DAILY
Qty: 56 TABLET | Refills: 0 | Status: SHIPPED | OUTPATIENT
Start: 2024-06-06

## 2024-06-06 RX ORDER — FLUOXETINE HYDROCHLORIDE 40 MG/1
40 CAPSULE ORAL DAILY
Qty: 30 CAPSULE | Refills: 0 | Status: SHIPPED | OUTPATIENT
Start: 2024-06-06 | End: 2025-06-06

## 2024-06-06 NOTE — PROGRESS NOTES
This provider is located at [facility]. The Patient is seen remotely located at the [facility] ([facility]) using Video. Patient is being seen via telehealth and confirm that they are in a secure environment for this session. The patient's condition being diagnosed/treated is appropriate for telemedicine. Provider identified as Xu Saeed as well as credentials APRN MSN FNP-C [id number].   The client/patient gave consent to be seen remotely, and when consent is given they understand that the consent allows for patient identifiable information to be sent to a third party as needed.   They may refuse to be seen remotely at any time. The electronic data is encrypted and password protected, and the patient has been advised of the potential risks to privacy not withstanding such measures.    Chief Complaint/History of Present Illness: Follow Up buprenorphine/naloxone Medicated Assisted Treatment for Opiate Use Disorder     Patient/Client Concerns/Updates: Continue Prozac and clonidine for anxiety, Strattera for difficulty concentrating, gabapentin for chronic bilateral leg pain and Seroquel for treatment resistant anxiety and insomnia  -Patient reports use of nonprescribed diazepam x 1 last week triggered by overwhelming life stress and anxiety triggered by father ill and mother arrested; patient reports this was a one-time use and has no further desire to use benzodiazepines and expresses understanding they are dangerous taken concurrently with buprenorphine  -For treatment resistant anxiety and insomnia add Seroquel 25 mg nightly  -Patient has been provided gabapentin from a family provider which is verified by Carl review; will accommodate patient's request to  this prescribing for patient to have all controlled medications prescribed from 1 provider   -Denies depressive exacerbations, no suicidal or homicidal thoughts no perceptual disturbances or symptoms of psychosis    Triggers  (Persons/Places/Things/Events/Thought/Emotions): Father ill mother incarcerated generalized anxiety panic attacks and insomnia    Cravings/Substance Use: Denies cravings use of nonprescribed diazepam x 1    Relapse Prevention: Counseling    Urine Drug Screen (today's visit) discussed: Positive buprenorphine and positive benzodiazepines    UDS Confirmation (Most recent/Resulted): Positive buprenorphine/nor-buprenorphine, no concerns for urine tampering otherwise positive gabapentin    Most recent pertinent laboratory studies reviewed: 10/24/2022-hepatic function within normal limits, Cr WNL              DONG (PDMP) Reviewed for Current/Active Medications: buprenorphine/naloxone and gabapentin as reviewed today    Past Surgical History:  Past Surgical History:   Procedure Laterality Date   • APPENDECTOMY     •  SECTION     • ENDOSCOPY N/A 10/28/2022    Procedure: ESOPHAGOGASTRODUODENOSCOPY;  Surgeon: Jayro Bar MD;  Location: [facility];  Service: Gastroenterology;  Laterality: N/A;       Problem List:  Patient Active Problem List   Diagnosis   • Esophageal dysphagia   • Iron deficiency anemia, unspecified   • Malabsorption of iron       Allergy:   Allergies   Allergen Reactions   • Atomoxetine Other (See Comments)     Skin crawling-per patient   • Diphenhydramine    • Sulfamethoxazole-Trimethoprim         Current Medications:   Current Outpatient Medications   Medication Sig Dispense Refill   • albuterol sulfate  (90 Base) MCG/ACT inhaler      • atomoxetine (STRATTERA) 60 MG capsule TAKE 1 CAPSULE BY MOUTH DAILY 30 capsule 0   • buprenorphine-naloxone (SUBOXONE) 8-2 MG per SL tablet Place 2 tablets under the tongue Daily. 56 tablet 0   • clindamycin (CLEOCIN) 300 MG capsule Take 1 capsule by mouth 3 times a day.     • cloNIDine (CATAPRES) 0.1 MG tablet TAKE 1 TABLET BY MOUTH TWICE DAILY 180 tablet 0   • Diclofenac Sodium (VOLTAREN) 1 % gel gel APPLY TOPICALLY TO THE AFFECTED AREA ON THE HANDS 3  TIMES DAILY     • estradiol cypionate (DEPO-ESTRADIOL) 5 MG/ML injection Inject  into the appropriate muscle as directed by prescriber Every 28 (Twenty-Eight) Days.     • FLUoxetine (PROzac) 40 MG capsule Take 1 capsule by mouth Daily. 30 capsule 0   • hydrOXYzine pamoate (VISTARIL) 25 MG capsule Take 1 capsule by mouth Every 12 (Twelve) Hours.     • ibuprofen (ADVIL,MOTRIN) 200 MG tablet Take 4 tablets by mouth Every 12 (Twelve) Hours.     • metroNIDAZOLE (FLAGYL) 500 MG tablet      • naloxone (NARCAN) 4 MG/0.1ML nasal spray 1 spray into the nostril(s) as directed by provider As Needed (opiate over sedation). 1 spray in 1 nostril every 2 to 3 minutes call 911 2 each 2   • Neurontin 600 MG tablet Take 1 tablet by mouth 3 (Three) Times a Day.     • pantoprazole (PROTONIX) 40 MG EC tablet Take 1 tablet by mouth Daily.     • valACYclovir (VALTREX) 1000 MG tablet        No current facility-administered medications for this visit.       Past Medical History:  Past Medical History:   Diagnosis Date   • Acid reflux    • Anemia    • Anxiety    • Substance abuse          Social History     Socioeconomic History   • Marital status: Single   Tobacco Use   • Smoking status: Every Day     Current packs/day: 1.00     Average packs/day: 1 pack/day for 7.0 years (7.0 ttl pk-yrs)     Types: Cigarettes     Passive exposure: Current   • Smokeless tobacco: Never   Vaping Use   • Vaping status: Never Used   Substance and Sexual Activity   • Alcohol use: Not Currently   • Drug use: Not Currently     Types: Methamphetamines     Comment: per patient has been clean for about 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   • Sexual activity: Defer     Birth control/protection: Depo-provera         Family History   Problem Relation Age of Onset   • Heart disease Mother    • Heart disease Father    • Cancer Maternal Grandmother    • Cancer Maternal Grandfather          Mental Status Exam:   Hygiene:   good  Cooperation:  Cooperative  Eye Contact:  Good  Psychomotor  Behavior:  Appropriate  Affect:  Appropriate  Mood: anxious  Speech:  Normal  Thought Process:  Goal directed  Thought Content:  Normal  Suicidal:  None  Homicidal:  None  Hallucinations:  None  Delusion:  None  Memory:  Intact  Orientation:  Grossly intact  Reliability:  good  Insight:  Good  Judgement:  Fair  Impulse Control:  Fair         Review of Systems:  Review of Systems   Constitutional:  Negative for activity change, chills, diaphoresis and fatigue.   Respiratory:  Negative for apnea, cough and shortness of breath.    Cardiovascular:  Negative for chest pain, palpitations and leg swelling.   Gastrointestinal:  Negative for abdominal pain, constipation, diarrhea, nausea and vomiting.   Genitourinary:  Negative for difficulty urinating.   Musculoskeletal:  Positive for arthralgias.   Skin:  Negative for rash.   Neurological:  Negative for dizziness, weakness and headaches.   Psychiatric/Behavioral:  Positive for sleep disturbance. Negative for agitation, self-injury and suicidal ideas. The patient is nervous/anxious.        Physical Exam:  Physical Exam  Vitals reviewed.   Constitutional:       General: She is not in acute distress.     Appearance: Normal appearance. She is not ill-appearing or toxic-appearing.   Pulmonary:      Effort: Pulmonary effort is normal.   Musculoskeletal:         General: Normal range of motion.   Neurological:      General: No focal deficit present.      Mental Status: She is alert and oriented to person, place, and time.   Psychiatric:         Attention and Perception: Attention and perception normal.         Mood and Affect: Mood normal. Mood is anxious. Mood is not depressed.         Speech: Speech normal.         Behavior: Behavior normal. Behavior is cooperative.         Thought Content: Thought content normal.         Cognition and Memory: Cognition and memory normal.         Judgment: Judgment normal.     Vital Signs:   There were no vitals taken for this visit.     Lab  Results:   Telemedicine on 04/30/2024   Component Date Value Ref Range Status   • External Amphetamine Screen Urine 04/30/2024 Negative   Final   • External Benzodiazepine Screen Uri* 04/30/2024 Negative   Final   • External Cocaine Screen Urine 04/30/2024 Negative   Final   • External THC Screen Urine 04/30/2024 Negative   Final   • External Methadone Screen Urine 04/30/2024 Negative   Final   • External Methamphetamine Screen Ur* 04/30/2024 Negative   Final   • External Oxycodone Screen Urine 04/30/2024 Negative   Final   • External Buprenorphine Screen Urine 04/30/2024 Positive   Final   • External MDMA 04/30/2024 Negative   Final   • External Opiates Screen Urine 04/30/2024 Negative   Final   Telemedicine on 02/22/2024   Component Date Value Ref Range Status   • External Amphetamine Screen Urine 02/22/2024 Negative   Final   • External Benzodiazepine Screen Uri* 02/22/2024 Negative   Final   • External Cocaine Screen Urine 02/22/2024 Negative   Final   • External THC Screen Urine 02/22/2024 Negative   Final   • External Methadone Screen Urine 02/22/2024 Negative   Final   • External Methamphetamine Screen Ur* 02/22/2024 Negative   Final   • External Oxycodone Screen Urine 02/22/2024 Negative   Final   • External Buprenorphine Screen Urine 02/22/2024 Positive (A)   Final   • External MDMA 02/22/2024 Negative   Final   • External Opiates Screen Urine 02/22/2024 Negative   Final   Telemedicine on 01/24/2024   Component Date Value Ref Range Status   • External Amphetamine Screen Urine 01/24/2024 Negative   Final   • External Benzodiazepine Screen Uri* 01/24/2024 Negative   Final   • External Cocaine Screen Urine 01/24/2024 Negative   Final   • External THC Screen Urine 01/24/2024 Negative   Final   • External Methadone Screen Urine 01/24/2024 Negative   Final   • External Methamphetamine Screen Ur* 01/24/2024 Negative   Final   • External Oxycodone Screen Urine 01/24/2024 Negative   Final   • External Buprenorphine  Screen Urine 01/24/2024 Positive (A)   Final   • External MDMA 01/24/2024 Negative   Final   • External Opiates Screen Urine 01/24/2024 Negative   Final   Telemedicine on 12/26/2023   Component Date Value Ref Range Status   • External Amphetamine Screen Urine 12/26/2023 Negative   Final   • External Benzodiazepine Screen Uri* 12/26/2023 Negative   Final   • External Cocaine Screen Urine 12/26/2023 Negative   Final   • External THC Screen Urine 12/26/2023 Negative   Final   • External Methadone Screen Urine 12/26/2023 Negative   Final   • External Methamphetamine Screen Ur* 12/26/2023 Negative   Final   • External Oxycodone Screen Urine 12/26/2023 Negative   Final   • External Buprenorphine Screen Urine 12/26/2023 Positive (A)   Final   • External MDMA 12/26/2023 Negative   Final   • External Opiates Screen Urine 12/26/2023 Negative   Final         Assessment & Plan   Diagnoses and all orders for this visit:    1. Opioid type dependence, continuous (Primary)  -     buprenorphine-naloxone (SUBOXONE) 8-2 MG per SL tablet; Place 2 tablets under the tongue Daily.  Dispense: 56 tablet; Refill: 0    2. Medication management  -     [facility] Drug Screen    3. Attention deficit hyperactivity disorder (ADHD), predominantly inattentive type  -     atomoxetine (STRATTERA) 60 MG capsule; Take 1 capsule by mouth Daily.  Dispense: 30 capsule; Refill: 0    4. Impaired concentration  -     atomoxetine (STRATTERA) 60 MG capsule; Take 1 capsule by mouth Daily.  Dispense: 30 capsule; Refill: 0    5. Generalized anxiety disorder  -     FLUoxetine (PROzac) 40 MG capsule; Take 1 capsule by mouth Daily.  Dispense: 30 capsule; Refill: 0  -     cloNIDine (CATAPRES) 0.1 MG tablet; Take 1 tablet by mouth 2 (Two) Times a Day.  Dispense: 180 tablet; Refill: 0  -     QUEtiapine (SEROquel) 25 MG tablet; Take 1 tablet by mouth Every Night.  Dispense: 30 tablet; Refill: 0    6. Primary insomnia  -     QUEtiapine (SEROquel) 25 MG tablet; Take 1 tablet  by mouth Every Night.  Dispense: 30 tablet; Refill: 0    7. Leg pain, bilateral  -     Neurontin 600 MG tablet; Take 1 tablet by mouth 4 (Four) Times a Day.  Dispense: 120 tablet; Refill: 0        Visit Diagnoses:    ICD-10-CM ICD-9-CM   1. Medication management  Z79.899 V58.69       PLAN:  Safety: No acute safety concerns  Risk Assessment: Risk of self-harm acutely is low. Risk of self-harm chronically is also low, but could be further elevated in the event of treatment noncompliance and/or AODA.    TREATMENT PLAN/GOALS: Continue supportive psychotherapy efforts and medications as indicated. Treatment and medication options discussed during today's visit. Patient acknowledged and verbally consented to continue with current treatment plan and was educated on the importance of compliance with treatment and follow-up appointments.    MEDICATION ISSUES:  DONG reviewed as expected.  Discussed medication options and treatment plan of prescribed medication as well as the risks, benefits, and side effects including potential falls, possible impaired driving and metabolic adversities among others. Patient is agreeable to call the office with any worsening of symptoms or onset of side effects. Patient is agreeable to call 911 or go to the nearest ER should he/she begin having SI/HI. No medication side effects or related complaints today.     MEDS ORDERED DURING VISIT:  No orders of the defined types were placed in this encounter.      No follow-ups on file.           This document has been electronically signed by ALMA Garcia  June 6, 2024 12:45 EDT      Part of this note may be an electronic transcription/translation of spoken language to printed text using the Dragon Dictation System.

## 2024-06-21 ENCOUNTER — TELEPHONE (OUTPATIENT)
Dept: ONCOLOGY | Facility: CLINIC | Age: 34
End: 2024-06-21
Payer: COMMERCIAL

## 2024-06-21 NOTE — TELEPHONE ENCOUNTER
Caller: Julieta Beasley    Relationship to patient: Self    Best call back number: [phone]    Type of visit: LAB AND F/U     Requested date: ASAP    If rescheduling, when is the original appointment: 3/1

## 2024-07-02 ENCOUNTER — PRIOR AUTHORIZATION (OUTPATIENT)
Dept: PSYCHIATRY | Facility: CLINIC | Age: 34
End: 2024-07-02
Payer: COMMERCIAL

## 2024-07-02 NOTE — TELEPHONE ENCOUNTER
nadeem allen (Key: [id number]) - [id number]  QUEtiapine Fumarate 25MG tablets  Status: MOSES Mcmahon

## 2024-07-02 NOTE — TELEPHONE ENCOUNTER
Approved today  The request has been approved. The authorization is effective from 07/02/2024 to 07/01/2025, as long as the member is enrolled in their current health plan. The request was reviewed and approved by a licensed clinical pharmacist. A written notification letter will follow with additional details.  Authorization Expiration Date: 6/30/2025

## 2024-07-08 ENCOUNTER — OFFICE VISIT (OUTPATIENT)
Dept: PSYCHIATRY | Facility: CLINIC | Age: 34
End: 2024-07-08
Payer: COMMERCIAL

## 2024-07-08 VITALS
HEIGHT: 65 IN | HEART RATE: 95 BPM | WEIGHT: 163.2 LBS | DIASTOLIC BLOOD PRESSURE: 58 MMHG | BODY MASS INDEX: 27.19 KG/M2 | SYSTOLIC BLOOD PRESSURE: 124 MMHG

## 2024-07-08 DIAGNOSIS — R41.840 IMPAIRED CONCENTRATION: ICD-10-CM

## 2024-07-08 DIAGNOSIS — F51.01 PRIMARY INSOMNIA: ICD-10-CM

## 2024-07-08 DIAGNOSIS — F90.0 ATTENTION DEFICIT HYPERACTIVITY DISORDER (ADHD), PREDOMINANTLY INATTENTIVE TYPE: ICD-10-CM

## 2024-07-08 DIAGNOSIS — F11.20 OPIOID TYPE DEPENDENCE, CONTINUOUS: Primary | ICD-10-CM

## 2024-07-08 DIAGNOSIS — M79.605 LEG PAIN, BILATERAL: ICD-10-CM

## 2024-07-08 DIAGNOSIS — Z79.899 MEDICATION MANAGEMENT: ICD-10-CM

## 2024-07-08 DIAGNOSIS — F41.1 GENERALIZED ANXIETY DISORDER: ICD-10-CM

## 2024-07-08 DIAGNOSIS — M79.604 LEG PAIN, BILATERAL: ICD-10-CM

## 2024-07-08 PROCEDURE — 1160F RVW MEDS BY RX/DR IN RCRD: CPT | Performed by: NURSE PRACTITIONER

## 2024-07-08 PROCEDURE — 1159F MED LIST DOCD IN RCRD: CPT | Performed by: NURSE PRACTITIONER

## 2024-07-08 PROCEDURE — 99214 OFFICE O/P EST MOD 30 MIN: CPT | Performed by: NURSE PRACTITIONER

## 2024-07-08 RX ORDER — BUPRENORPHINE HYDROCHLORIDE AND NALOXONE HYDROCHLORIDE DIHYDRATE 8; 2 MG/1; MG/1
2 TABLET SUBLINGUAL DAILY
Qty: 56 TABLET | Refills: 0 | Status: SHIPPED | OUTPATIENT
Start: 2024-07-08

## 2024-07-08 RX ORDER — FLUOXETINE HYDROCHLORIDE 40 MG/1
40 CAPSULE ORAL DAILY
Qty: 30 CAPSULE | Refills: 0 | Status: SHIPPED | OUTPATIENT
Start: 2024-07-08 | End: 2025-07-08

## 2024-07-08 RX ORDER — QUETIAPINE FUMARATE 25 MG/1
25 TABLET, FILM COATED ORAL NIGHTLY
Qty: 30 TABLET | Refills: 0 | Status: SHIPPED | OUTPATIENT
Start: 2024-07-08

## 2024-07-08 RX ORDER — GABAPENTIN 600 MG
600 TABLET ORAL 4 TIMES DAILY
Qty: 120 TABLET | Refills: 0 | Status: SHIPPED | OUTPATIENT
Start: 2024-07-08

## 2024-07-08 RX ORDER — ATOMOXETINE 60 MG/1
60 CAPSULE ORAL DAILY
Qty: 30 CAPSULE | Refills: 0 | Status: SHIPPED | OUTPATIENT
Start: 2024-07-08 | End: 2025-07-08

## 2024-07-08 RX ORDER — CLONIDINE HYDROCHLORIDE 0.1 MG/1
0.1 TABLET ORAL 2 TIMES DAILY
Qty: 180 TABLET | Refills: 0 | Status: SHIPPED | OUTPATIENT
Start: 2024-07-08

## 2024-07-08 NOTE — PROGRESS NOTES
Medical Evaluation conducted in person/face to face. Provider Xu MARQUEZ and client located at [facility]    Chief Complaint/History of Present Illness: Follow Up buprenorphine/naloxone Medicated Assisted Treatment for Opiate Use Disorder     Patient/Client Concerns/Updates: Continue Prozac and clonidine for anxiety, Strattera for difficulty concentrating, gabapentin for chronic bilateral leg pain and Seroquel for treatment resistant anxiety and insomnia   -Patient has yet to initiate Seroquel treatment due to insurance fluctuations however reports this has been resolved and anticipates being able to  from the pharmacy today  -Patient continues to struggle with difficulty concentrating and being hyperactive; continue encouragement to establish care with psychiatry in the [facility] for evaluation; patient expresses therapeutic benefit with Strattera  -Mother has been released from incarceration and father at home is doing well recently having been ill  -Patient reports her mood is stable and denies depressive or anxious episodes or exacerbations, no suicidal or homicidal thoughts    Triggers (Persons/Places/Things/Events/Thought/Emotions): Generalized anxiety difficulty concentrating chronic leg pain and insomnia    Cravings/Substance Use: Denies cravings or use of illicit substances    Relapse Prevention: Counseling    Urine Drug Screen (today's visit) discussed: Positive buprenorphine, otherwise negative for substances tested    UDS Confirmation (Most recent/Resulted): Positive buprenorphine/nor-buprenorphine, no concerns for urine tampering otherwise positive gabapentin and metabolite for nordazepam    Most recent pertinent laboratory studies reviewed: 10/24/2022-hepatic function within normal limits, [place] UMANA (PDMP) Reviewed for Current/Active Medications: buprenorphine/naloxone and gabapentin as reviewed today    Past Surgical History:  Past  Surgical History:   Procedure Laterality Date   • APPENDECTOMY     •  SECTION     • ENDOSCOPY N/A 10/28/2022    Procedure: ESOPHAGOGASTRODUODENOSCOPY;  Surgeon: Jayro Bar MD;  Location: [facility];  Service: Gastroenterology;  Laterality: N/A;       Problem List:  Patient Active Problem List   Diagnosis   • Esophageal dysphagia   • Iron deficiency anemia, unspecified   • Malabsorption of iron       Allergy:   Allergies   Allergen Reactions   • Atomoxetine Other (See Comments)     Skin crawling-per patient   • Diphenhydramine    • Sulfamethoxazole-Trimethoprim         Current Medications:   Current Outpatient Medications   Medication Sig Dispense Refill   • albuterol sulfate  (90 Base) MCG/ACT inhaler      • atomoxetine (STRATTERA) 60 MG capsule Take 1 capsule by mouth Daily. 30 capsule 0   • buprenorphine-naloxone (SUBOXONE) 8-2 MG per SL tablet Place 2 tablets under the tongue Daily. 56 tablet 0   • cloNIDine (CATAPRES) 0.1 MG tablet Take 1 tablet by mouth 2 (Two) Times a Day. 180 tablet 0   • Diclofenac Sodium (VOLTAREN) 1 % gel gel APPLY TOPICALLY TO THE AFFECTED AREA ON THE HANDS 3 TIMES DAILY     • estradiol cypionate (DEPO-ESTRADIOL) 5 MG/ML injection Inject  into the appropriate muscle as directed by prescriber Every 28 (Twenty-Eight) Days.     • FLUoxetine (PROzac) 40 MG capsule Take 1 capsule by mouth Daily. 30 capsule 0   • hydrOXYzine pamoate (VISTARIL) 25 MG capsule Take 1 capsule by mouth Every 12 (Twelve) Hours.     • ibuprofen (ADVIL,MOTRIN) 200 MG tablet Take 4 tablets by mouth Every 12 (Twelve) Hours.     • metroNIDAZOLE (FLAGYL) 500 MG tablet      • Neurontin 600 MG tablet Take 1 tablet by mouth 4 (Four) Times a Day. 120 tablet 0   • pantoprazole (PROTONIX) 40 MG EC tablet Take 1 tablet by mouth Daily.     • QUEtiapine (SEROquel) 25 MG tablet Take 1 tablet by mouth Every Night. 30 tablet 0   • valACYclovir (VALTREX) 1000 MG tablet      • naloxone (NARCAN) 4 MG/0.1ML nasal spray  1 spray into the nostril(s) as directed by provider As Needed (opiate over sedation). 1 spray in 1 nostril every 2 to 3 minutes call 911 (Patient not taking: Reported on 7/8/2024) 2 each 2     No current facility-administered medications for this visit.       Past Medical History:  Past Medical History:   Diagnosis Date   • Acid reflux    • Anemia    • Anxiety    • Substance abuse              Social History     Socioeconomic History   • Marital status: Single   Tobacco Use   • Smoking status: Every Day     Current packs/day: 1.00     Average packs/day: 1 pack/day for 7.0 years (7.0 ttl pk-yrs)     Types: Cigarettes     Passive exposure: Current   • Smokeless tobacco: Never   Vaping Use   • Vaping status: Never Used   Substance and Sexual Activity   • Alcohol use: Not Currently   • Drug use: Not Currently     Types: Methamphetamines     Comment: per patient has been clean for about 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   • Sexual activity: Defer     Birth control/protection: Depo-provera       Family History   Problem Relation Age of Onset   • Heart disease Mother    • Heart disease Father    • Cancer Maternal Grandmother    • Cancer Maternal Grandfather          Mental Status Exam:   Hygiene:   good  Cooperation:  Cooperative  Eye Contact:  Good  Psychomotor Behavior:  Appropriate  Affect:  Appropriate  Mood: anxious  Speech:  Normal  Thought Process:  Goal directed  Thought Content:  Normal  Suicidal:  None  Homicidal:  None  Hallucinations:  None  Delusion:  None  Memory:  Intact  Orientation:  Grossly intact  Reliability:  good  Insight:  Good  Judgement:  Good  Impulse Control:  Good            Review of Systems:  Review of Systems   Constitutional:  Negative for activity change, chills, diaphoresis and fatigue.   Respiratory:  Negative for apnea, cough and shortness of breath.    Cardiovascular:  Negative for chest pain, palpitations and leg swelling.   Gastrointestinal:  Negative for abdominal pain, constipation, diarrhea,  "nausea and vomiting.   Genitourinary:  Negative for difficulty urinating.   Musculoskeletal:  Negative for arthralgias.   Skin:  Negative for rash.   Neurological:  Negative for dizziness, weakness and headaches.   Psychiatric/Behavioral:  Positive for decreased concentration and sleep disturbance. Negative for agitation, self-injury and suicidal ideas. The patient is nervous/anxious.        Physical Exam:  Physical Exam  Vitals reviewed.   Constitutional:       General: She is not in acute distress.     Appearance: Normal appearance. She is not ill-appearing or toxic-appearing.   Pulmonary:      Effort: Pulmonary effort is normal.   Musculoskeletal:         General: Normal range of motion.   Neurological:      General: No focal deficit present.      Mental Status: She is alert and oriented to person, place, and time.   Psychiatric:         Attention and Perception: Attention and perception normal.         Mood and Affect: Mood is anxious. Mood is not depressed.         Speech: Speech normal.         Behavior: Behavior normal. Behavior is cooperative.         Thought Content: Thought content normal.         Cognition and Memory: Cognition and memory normal.         Judgment: Judgment normal.     Vital Signs:   /58   Pulse 95   Ht 165.1 cm (65\")   Wt 74 kg (163 lb 3.2 oz)   BMI 27.16 kg/m²      Lab Results:   Office Visit on 07/08/2024   Component Date Value Ref Range Status   • External Amphetamine Screen Urine 07/08/2024 Negative   Final   • External Benzodiazepine Screen Uri* 07/08/2024 Negative   Final   • External Cocaine Screen Urine 07/08/2024 Negative   Final   • External THC Screen Urine 07/08/2024 Negative   Final   • External Methadone Screen Urine 07/08/2024 Negative   Final   • External Methamphetamine Screen Ur* 07/08/2024 Negative   Final   • External Oxycodone Screen Urine 07/08/2024 Negative   Final   • External Buprenorphine Screen Urine 07/08/2024 Positive (A)   Final   • External MDMA " 07/08/2024 Negative   Final   • External Opiates Screen Urine 07/08/2024 Negative   Final   Telemedicine on 06/06/2024   Component Date Value Ref Range Status   • External Amphetamine Screen Urine 06/06/2024 Negative   Final   • External Benzodiazepine Screen Uri* 06/06/2024 Positive (A)   Final   • External Cocaine Screen Urine 06/06/2024 Negative   Final   • External THC Screen Urine 06/06/2024 Negative   Final   • External Methadone Screen Urine 06/06/2024 Negative   Final   • External Methamphetamine Screen Ur* 06/06/2024 Negative   Final   • External Oxycodone Screen Urine 06/06/2024 Negative   Final   • External Buprenorphine Screen Urine 06/06/2024 Positive (A)   Final   • External MDMA 06/06/2024 Negative   Final   • External Opiates Screen Urine 06/06/2024 Negative   Final   Telemedicine on 04/30/2024   Component Date Value Ref Range Status   • External Amphetamine Screen Urine 04/30/2024 Negative   Final   • External Benzodiazepine Screen Uri* 04/30/2024 Negative   Final   • External Cocaine Screen Urine 04/30/2024 Negative   Final   • External THC Screen Urine 04/30/2024 Negative   Final   • External Methadone Screen Urine 04/30/2024 Negative   Final   • External Methamphetamine Screen Ur* 04/30/2024 Negative   Final   • External Oxycodone Screen Urine 04/30/2024 Negative   Final   • External Buprenorphine Screen Urine 04/30/2024 Positive   Final   • External MDMA 04/30/2024 Negative   Final   • External Opiates Screen Urine 04/30/2024 Negative   Final   Telemedicine on 02/22/2024   Component Date Value Ref Range Status   • External Amphetamine Screen Urine 02/22/2024 Negative   Final   • External Benzodiazepine Screen Uri* 02/22/2024 Negative   Final   • External Cocaine Screen Urine 02/22/2024 Negative   Final   • External THC Screen Urine 02/22/2024 Negative   Final   • External Methadone Screen Urine 02/22/2024 Negative   Final   • External Methamphetamine Screen Ur* 02/22/2024 Negative   Final   •  External Oxycodone Screen Urine 02/22/2024 Negative   Final   • External Buprenorphine Screen Urine 02/22/2024 Positive (A)   Final   • External MDMA 02/22/2024 Negative   Final   • External Opiates Screen Urine 02/22/2024 Negative   Final   Telemedicine on 01/24/2024   Component Date Value Ref Range Status   • External Amphetamine Screen Urine 01/24/2024 Negative   Final   • External Benzodiazepine Screen Uri* 01/24/2024 Negative   Final   • External Cocaine Screen Urine 01/24/2024 Negative   Final   • External THC Screen Urine 01/24/2024 Negative   Final   • External Methadone Screen Urine 01/24/2024 Negative   Final   • External Methamphetamine Screen Ur* 01/24/2024 Negative   Final   • External Oxycodone Screen Urine 01/24/2024 Negative   Final   • External Buprenorphine Screen Urine 01/24/2024 Positive (A)   Final   • External MDMA 01/24/2024 Negative   Final   • External Opiates Screen Urine 01/24/2024 Negative   Final           Assessment & Plan   Diagnoses and all orders for this visit:    1. Opioid type dependence, continuous (Primary)  -     buprenorphine-naloxone (SUBOXONE) 8-2 MG per SL tablet; Place 2 tablets under the tongue Daily.  Dispense: 56 tablet; Refill: 0    2. Medication management  -     [facility] Drug Screen    3. Attention deficit hyperactivity disorder (ADHD), predominantly inattentive type  -     atomoxetine (STRATTERA) 60 MG capsule; Take 1 capsule by mouth Daily.  Dispense: 30 capsule; Refill: 0    4. Impaired concentration  -     atomoxetine (STRATTERA) 60 MG capsule; Take 1 capsule by mouth Daily.  Dispense: 30 capsule; Refill: 0    5. Leg pain, bilateral  -     Neurontin 600 MG tablet; Take 1 tablet by mouth 4 (Four) Times a Day.  Dispense: 120 tablet; Refill: 0    6. Generalized anxiety disorder  -     FLUoxetine (PROzac) 40 MG capsule; Take 1 capsule by mouth Daily.  Dispense: 30 capsule; Refill: 0  -     cloNIDine (CATAPRES) 0.1 MG tablet; Take 1 tablet by mouth 2 (Two) Times a Day.   Dispense: 180 tablet; Refill: 0  -     QUEtiapine (SEROquel) 25 MG tablet; Take 1 tablet by mouth Every Night.  Dispense: 30 tablet; Refill: 0    7. Primary insomnia  -     QUEtiapine (SEROquel) 25 MG tablet; Take 1 tablet by mouth Every Night.  Dispense: 30 tablet; Refill: 0        Visit Diagnoses:    ICD-10-CM ICD-9-CM   1. Opioid type dependence, continuous  F11.20 304.01   2. Medication management  Z79.899 V58.69   3. Attention deficit hyperactivity disorder (ADHD), predominantly inattentive type  F90.0 314.00   4. Impaired concentration  R41.840 799.51   5. Leg pain, bilateral  M79.604 729.5    M79.605    6. Generalized anxiety disorder  F41.1 300.02   7. Primary insomnia  F51.01 307.42       PLAN:  Safety: No acute safety concerns  Risk Assessment: Risk of self-harm acutely is low. Risk of self-harm chronically is also low, but could be further elevated in the event of treatment noncompliance and/or AODA.      TREATMENT PLAN/GOALS: Continue supportive psychotherapy efforts and medications as indicated. Treatment and medication options discussed during today's visit. Patient acknowledged and verbally consented to continue with current treatment plan and was educated on the importance of compliance with treatment and follow-up appointments.        MEDICATION ISSUES:  DONG reviewed as expected.  Discussed medication options and treatment plan of prescribed medication as well as the risks, benefits, and side effects including potential falls, possible impaired driving and metabolic adversities among others. Patient is agreeable to call the office with any worsening of symptoms or onset of side effects. Patient is agreeable to call 911 or go to the nearest ER should he/she begin having SI/HI. No medication side effects or related complaints today.     MEDS ORDERED DURING VISIT:  New Medications Ordered This Visit   Medications   • buprenorphine-naloxone (SUBOXONE) 8-2 MG per SL tablet     Sig: Place 2 tablets  under the tongue Daily.     Dispense:  56 tablet     Refill:  0     NADEAN:[id number]   • atomoxetine (STRATTERA) 60 MG capsule     Sig: Take 1 capsule by mouth Daily.     Dispense:  30 capsule     Refill:  0   • Neurontin 600 MG tablet     Sig: Take 1 tablet by mouth 4 (Four) Times a Day.     Dispense:  120 tablet     Refill:  0   • FLUoxetine (PROzac) 40 MG capsule     Sig: Take 1 capsule by mouth Daily.     Dispense:  30 capsule     Refill:  0   • cloNIDine (CATAPRES) 0.1 MG tablet     Sig: Take 1 tablet by mouth 2 (Two) Times a Day.     Dispense:  180 tablet     Refill:  0   • QUEtiapine (SEROquel) 25 MG tablet     Sig: Take 1 tablet by mouth Every Night.     Dispense:  30 tablet     Refill:  0       No follow-ups on file.           This document has been electronically signed by ALMA Garcia  July 8, 2024 15:04 EDT      Part of this note may be an electronic transcription/translation of spoken language to printed text using the Dragon Dictation System.

## 2024-07-11 RX ORDER — VALACYCLOVIR HYDROCHLORIDE 1 G/1
1000 TABLET, FILM COATED ORAL 2 TIMES DAILY
Qty: 14 TABLET | Refills: 0 | Status: SHIPPED | OUTPATIENT
Start: 2024-07-11 | End: 2024-07-18

## 2024-07-18 DIAGNOSIS — D50.9 IRON DEFICIENCY ANEMIA, UNSPECIFIED IRON DEFICIENCY ANEMIA TYPE: Primary | ICD-10-CM

## 2024-07-23 RX ORDER — PANTOPRAZOLE SODIUM 40 MG/1
40 TABLET, DELAYED RELEASE ORAL DAILY
Qty: 30 TABLET | Refills: 0 | Status: SHIPPED | OUTPATIENT
Start: 2024-07-23

## 2024-07-23 NOTE — TELEPHONE ENCOUNTER
Pharmacy is saying they never received the prescription for gabapentin sent on 7/8/24. Do you care to resend it? Thanks.

## 2024-08-05 ENCOUNTER — TELEMEDICINE (OUTPATIENT)
Dept: PSYCHIATRY | Facility: CLINIC | Age: 34
End: 2024-08-05
Payer: COMMERCIAL

## 2024-08-05 VITALS
SYSTOLIC BLOOD PRESSURE: 119 MMHG | BODY MASS INDEX: 26.14 KG/M2 | HEIGHT: 65 IN | DIASTOLIC BLOOD PRESSURE: 78 MMHG | WEIGHT: 156.9 LBS | HEART RATE: 104 BPM

## 2024-08-05 DIAGNOSIS — Z79.899 MEDICATION MANAGEMENT: ICD-10-CM

## 2024-08-05 DIAGNOSIS — R41.840 IMPAIRED CONCENTRATION: ICD-10-CM

## 2024-08-05 DIAGNOSIS — F90.0 ATTENTION DEFICIT HYPERACTIVITY DISORDER (ADHD), PREDOMINANTLY INATTENTIVE TYPE: ICD-10-CM

## 2024-08-05 DIAGNOSIS — F41.1 GENERALIZED ANXIETY DISORDER: ICD-10-CM

## 2024-08-05 DIAGNOSIS — F11.20 OPIOID TYPE DEPENDENCE, CONTINUOUS: Primary | ICD-10-CM

## 2024-08-05 DIAGNOSIS — F51.01 PRIMARY INSOMNIA: ICD-10-CM

## 2024-08-05 LAB
EXTERNAL AMPHETAMINE SCREEN URINE: POSITIVE
EXTERNAL BENZODIAZEPINE SCREEN URINE: NEGATIVE
EXTERNAL BUPRENORPHINE SCREEN URINE: POSITIVE
EXTERNAL COCAINE SCREEN URINE: NEGATIVE
EXTERNAL MDMA: NEGATIVE
EXTERNAL METHADONE SCREEN URINE: NEGATIVE
EXTERNAL METHAMPHETAMINE SCREEN URINE: POSITIVE
EXTERNAL OPIATES SCREEN URINE: NEGATIVE
EXTERNAL OXYCODONE SCREEN URINE: NEGATIVE
EXTERNAL THC SCREEN URINE: NEGATIVE

## 2024-08-05 PROCEDURE — 1159F MED LIST DOCD IN RCRD: CPT | Performed by: NURSE PRACTITIONER

## 2024-08-05 PROCEDURE — 1160F RVW MEDS BY RX/DR IN RCRD: CPT | Performed by: NURSE PRACTITIONER

## 2024-08-05 PROCEDURE — 99214 OFFICE O/P EST MOD 30 MIN: CPT | Performed by: NURSE PRACTITIONER

## 2024-08-05 RX ORDER — BUPRENORPHINE HYDROCHLORIDE AND NALOXONE HYDROCHLORIDE DIHYDRATE 8; 2 MG/1; MG/1
2 TABLET SUBLINGUAL DAILY
Qty: 58 TABLET | Refills: 0 | Status: SHIPPED | OUTPATIENT
Start: 2024-08-05

## 2024-08-05 RX ORDER — QUETIAPINE FUMARATE 50 MG/1
50 TABLET, FILM COATED ORAL NIGHTLY
Qty: 30 TABLET | Refills: 0 | Status: SHIPPED | OUTPATIENT
Start: 2024-08-05

## 2024-08-05 RX ORDER — FLUOXETINE HYDROCHLORIDE 40 MG/1
40 CAPSULE ORAL DAILY
Qty: 30 CAPSULE | Refills: 0 | Status: SHIPPED | OUTPATIENT
Start: 2024-08-05 | End: 2025-08-05

## 2024-08-05 RX ORDER — ATOMOXETINE 60 MG/1
60 CAPSULE ORAL DAILY
Qty: 30 CAPSULE | Refills: 0 | Status: SHIPPED | OUTPATIENT
Start: 2024-08-05 | End: 2025-08-05

## 2024-08-05 NOTE — PROGRESS NOTES
This provider is located at [facility]. The Patient is seen remotely located at the [facility] ([facility]) using Video. Patient is being seen via telehealth and confirm that they are in a secure environment for this session. The patient's condition being diagnosed/treated is appropriate for telemedicine. Provider identified as Xu Saeed as well as credentials APRN MSN FNP-C [id number].   The client/patient gave consent to be seen remotely, and when consent is given they understand that the consent allows for patient identifiable information to be sent to a third party as needed.   They may refuse to be seen remotely at any time. The electronic data is encrypted and password protected, and the patient has been advised of the potential risks to privacy not withstanding such measures.    Chief Complaint/History of Present Illness: Follow Up buprenorphine/naloxone Medicated Assisted Treatment for Opiate Use Disorder     Patient/Client Concerns/Updates:  Continue Prozac and clonidine for anxiety, Strattera for difficulty concentrating, gabapentin for chronic bilateral leg pain and Seroquel for treatment resistant anxiety and insomnia  -Seroquel initiated last month for treatment resistant anxiety and insomnia patient reports she is tolerating this medication well however reports over the past month self increased to 50 mg; we will continue at this dosage as patient reports 50 mg  therapeutically beneficial  -Recent relapse on methamphetamine times once; patient reports she was triggered by individuals she inadvertently encountered and will succumb to peer temptation; patient reports she has no further need or desire to use methamphetamine and is working through recent relapse and the associated shame and guilt; discussed with patient relapse prevention techniques and strategies as well as processing the recent relapse going forward with her goals and recovery  -Reports continued therapeutic  benefit and satisfaction with current care plan; reports no concerning side effects with current medication prescribed  -Mood is stable; patient denies anxious episodes, exacerbations thereof or concerning panic attacks  -Reports no depressive episodes or concerns, no suicidal or homicidal thoughts  -Reports no perceptual disturbances or otherwise symptoms of psychosis  -Patient reports no concerns with sleep quality or disturbance thereof    Triggers (Persons/Places/Things/Events/Thought/Emotions): Generalized anxiety insomnia, chronic pain and prior relationships who remain in active addiction    Cravings/Substance Use: Use of methamphetamine once denies cravings for any illicit substances    Relapse Prevention: Counseling    Urine Drug Screen (today's visit) discussed: Positive buprenorphine positive amphetamine and methamphetamine    UDS Confirmation (Most recent/Resulted): Positive buprenorphine/nor-buprenorphine, no concerns for urine tampering otherwise positive gabapentin    Most recent pertinent laboratory studies reviewed: 10/24/2022-hepatic function within normal limits, Cr WNL         DONG (PDMP) Reviewed for Current/Active Medications: buprenorphine/naloxone and gabapentin as reviewed today    Past Surgical History:  Past Surgical History:   Procedure Laterality Date   • APPENDECTOMY     •  SECTION     • ENDOSCOPY N/A 10/28/2022    Procedure: ESOPHAGOGASTRODUODENOSCOPY;  Surgeon: Jayro Bar MD;  Location: [facility];  Service: Gastroenterology;  Laterality: N/A;       Problem List:  Patient Active Problem List   Diagnosis   • Esophageal dysphagia   • Iron deficiency anemia, unspecified   • Malabsorption of iron       Allergy:   Allergies   Allergen Reactions   • Atomoxetine Other (See Comments)     Skin crawling-per patient   • Diphenhydramine    • Sulfamethoxazole-Trimethoprim         Current Medications:   Current Outpatient Medications   Medication Sig Dispense Refill   • albuterol  sulfate  (90 Base) MCG/ACT inhaler      • atomoxetine (STRATTERA) 60 MG capsule Take 1 capsule by mouth Daily. 30 capsule 0   • buprenorphine-naloxone (SUBOXONE) 8-2 MG per SL tablet Place 2 tablets under the tongue Daily. 58 tablet 0   • cloNIDine (CATAPRES) 0.1 MG tablet Take 1 tablet by mouth 2 (Two) Times a Day. 180 tablet 0   • Diclofenac Sodium (VOLTAREN) 1 % gel gel APPLY TOPICALLY TO THE AFFECTED AREA ON THE HANDS 3 TIMES DAILY     • estradiol cypionate (DEPO-ESTRADIOL) 5 MG/ML injection Inject  into the appropriate muscle as directed by prescriber Every 28 (Twenty-Eight) Days.     • FLUoxetine (PROzac) 40 MG capsule Take 1 capsule by mouth Daily. 30 capsule 0   • hydrOXYzine pamoate (VISTARIL) 25 MG capsule Take 1 capsule by mouth Every 12 (Twelve) Hours.     • ibuprofen (ADVIL,MOTRIN) 200 MG tablet Take 4 tablets by mouth Every 12 (Twelve) Hours.     • metroNIDAZOLE (FLAGYL) 500 MG tablet      • Neurontin 600 MG tablet Take 1 tablet by mouth 4 (Four) Times a Day. 120 tablet 0   • pantoprazole (PROTONIX) 40 MG EC tablet Take 1 tablet by mouth Daily. 30 tablet 0   • QUEtiapine (SEROquel) 50 MG tablet Take 1 tablet by mouth Every Night. 30 tablet 0   • naloxone (NARCAN) 4 MG/0.1ML nasal spray 1 spray into the nostril(s) as directed by provider As Needed (opiate over sedation). 1 spray in 1 nostril every 2 to 3 minutes call 911 (Patient not taking: Reported on 7/8/2024) 2 each 2     No current facility-administered medications for this visit.       Past Medical History:  Past Medical History:   Diagnosis Date   • Acid reflux    • Anemia    • Anxiety    • Substance abuse          Social History     Socioeconomic History   • Marital status: Single   Tobacco Use   • Smoking status: Every Day     Current packs/day: 1.00     Average packs/day: 1 pack/day for 7.0 years (7.0 ttl pk-yrs)     Types: Cigarettes     Passive exposure: Current   • Smokeless tobacco: Never   Vaping Use   • Vaping status: Never Used    Substance and Sexual Activity   • Alcohol use: Not Currently   • Drug use: Not Currently     Types: Methamphetamines     Comment: per patient has been clean for about 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   • Sexual activity: Defer     Birth control/protection: Depo-provera         Family History   Problem Relation Age of Onset   • Heart disease Mother    • Heart disease Father    • Cancer Maternal Grandmother    • Cancer Maternal Grandfather          Mental Status Exam:   Hygiene:   good  Cooperation:  Cooperative  Eye Contact:  Good  Psychomotor Behavior:  Appropriate  Affect:  Appropriate  Mood: anxious  Speech:  Normal  Thought Process:  Goal directed  Thought Content:  Normal  Suicidal:  None  Homicidal:  None  Hallucinations:  None  Delusion:  None  Memory:  Intact  Orientation:  Grossly intact  Reliability:  good  Insight:  Good  Judgement:  Fair  Impulse Control:  Fair         Review of Systems:  Review of Systems   Constitutional:  Negative for activity change, chills, diaphoresis and fatigue.   Respiratory:  Negative for apnea, cough and shortness of breath.    Cardiovascular:  Negative for chest pain, palpitations and leg swelling.   Gastrointestinal:  Negative for abdominal pain, constipation, diarrhea, nausea and vomiting.   Genitourinary:  Negative for difficulty urinating.   Musculoskeletal:  Positive for arthralgias.   Skin:  Negative for rash.   Neurological:  Negative for dizziness, weakness and headaches.   Psychiatric/Behavioral:  Positive for sleep disturbance. Negative for agitation, self-injury and suicidal ideas. The patient is nervous/anxious.        Physical Exam:  Physical Exam  Vitals reviewed.   Constitutional:       General: She is not in acute distress.     Appearance: Normal appearance. She is not ill-appearing or toxic-appearing.   Pulmonary:      Effort: Pulmonary effort is normal.   Musculoskeletal:         General: Normal range of motion.   Neurological:      General: No focal deficit present.     "  Mental Status: She is alert and oriented to person, place, and time.   Psychiatric:         Attention and Perception: Attention and perception normal.         Mood and Affect: Mood is anxious. Mood is not depressed.         Speech: Speech normal.         Behavior: Behavior normal. Behavior is cooperative.         Thought Content: Thought content normal.         Cognition and Memory: Cognition and memory normal.         Judgment: Judgment normal.     Vital Signs:   /78   Pulse 104   Ht 165.1 cm (65\")   Wt 71.2 kg (156 lb 14.4 oz)   BMI 26.11 kg/m²      Lab Results:   Telemedicine on 08/05/2024   Component Date Value Ref Range Status   • External Amphetamine Screen Urine 08/05/2024 Positive (A)   Final   • External Benzodiazepine Screen Uri* 08/05/2024 Negative   Final   • External Cocaine Screen Urine 08/05/2024 Negative   Final   • External THC Screen Urine 08/05/2024 Negative   Final   • External Methadone Screen Urine 08/05/2024 Negative   Final   • External Methamphetamine Screen Ur* 08/05/2024 Positive (A)   Final   • External Oxycodone Screen Urine 08/05/2024 Negative   Final   • External Buprenorphine Screen Urine 08/05/2024 Positive (A)   Final   • External MDMA 08/05/2024 Negative   Final   • External Opiates Screen Urine 08/05/2024 Negative   Final   Office Visit on 07/08/2024   Component Date Value Ref Range Status   • External Amphetamine Screen Urine 07/08/2024 Negative   Final   • External Benzodiazepine Screen Uri* 07/08/2024 Negative   Final   • External Cocaine Screen Urine 07/08/2024 Negative   Final   • External THC Screen Urine 07/08/2024 Negative   Final   • External Methadone Screen Urine 07/08/2024 Negative   Final   • External Methamphetamine Screen Ur* 07/08/2024 Negative   Final   • External Oxycodone Screen Urine 07/08/2024 Negative   Final   • External Buprenorphine Screen Urine 07/08/2024 Positive (A)   Final   • External MDMA 07/08/2024 Negative   Final   • External Opiates " Screen Urine 07/08/2024 Negative   Final   Telemedicine on 06/06/2024   Component Date Value Ref Range Status   • External Amphetamine Screen Urine 06/06/2024 Negative   Final   • External Benzodiazepine Screen Uri* 06/06/2024 Positive (A)   Final   • External Cocaine Screen Urine 06/06/2024 Negative   Final   • External THC Screen Urine 06/06/2024 Negative   Final   • External Methadone Screen Urine 06/06/2024 Negative   Final   • External Methamphetamine Screen Ur* 06/06/2024 Negative   Final   • External Oxycodone Screen Urine 06/06/2024 Negative   Final   • External Buprenorphine Screen Urine 06/06/2024 Positive (A)   Final   • External MDMA 06/06/2024 Negative   Final   • External Opiates Screen Urine 06/06/2024 Negative   Final   Telemedicine on 04/30/2024   Component Date Value Ref Range Status   • External Amphetamine Screen Urine 04/30/2024 Negative   Final   • External Benzodiazepine Screen Uri* 04/30/2024 Negative   Final   • External Cocaine Screen Urine 04/30/2024 Negative   Final   • External THC Screen Urine 04/30/2024 Negative   Final   • External Methadone Screen Urine 04/30/2024 Negative   Final   • External Methamphetamine Screen Ur* 04/30/2024 Negative   Final   • External Oxycodone Screen Urine 04/30/2024 Negative   Final   • External Buprenorphine Screen Urine 04/30/2024 Positive   Final   • External MDMA 04/30/2024 Negative   Final   • External Opiates Screen Urine 04/30/2024 Negative   Final   Telemedicine on 02/22/2024   Component Date Value Ref Range Status   • External Amphetamine Screen Urine 02/22/2024 Negative   Final   • External Benzodiazepine Screen Uri* 02/22/2024 Negative   Final   • External Cocaine Screen Urine 02/22/2024 Negative   Final   • External THC Screen Urine 02/22/2024 Negative   Final   • External Methadone Screen Urine 02/22/2024 Negative   Final   • External Methamphetamine Screen Ur* 02/22/2024 Negative   Final   • External Oxycodone Screen Urine 02/22/2024 Negative    Final   • External Buprenorphine Screen Urine 02/22/2024 Positive (A)   Final   • External MDMA 02/22/2024 Negative   Final   • External Opiates Screen Urine 02/22/2024 Negative   Final         Assessment & Plan   Diagnoses and all orders for this visit:    1. Opioid type dependence, continuous (Primary)  -     buprenorphine-naloxone (SUBOXONE) 8-2 MG per SL tablet; Place 2 tablets under the tongue Daily.  Dispense: 58 tablet; Refill: 0    2. Medication management  -     [facility] Drug Screen    3. Attention deficit hyperactivity disorder (ADHD), predominantly inattentive type  -     atomoxetine (STRATTERA) 60 MG capsule; Take 1 capsule by mouth Daily.  Dispense: 30 capsule; Refill: 0    4. Impaired concentration  -     atomoxetine (STRATTERA) 60 MG capsule; Take 1 capsule by mouth Daily.  Dispense: 30 capsule; Refill: 0    5. Generalized anxiety disorder  -     FLUoxetine (PROzac) 40 MG capsule; Take 1 capsule by mouth Daily.  Dispense: 30 capsule; Refill: 0  -     QUEtiapine (SEROquel) 50 MG tablet; Take 1 tablet by mouth Every Night.  Dispense: 30 tablet; Refill: 0    6. Primary insomnia  -     QUEtiapine (SEROquel) 50 MG tablet; Take 1 tablet by mouth Every Night.  Dispense: 30 tablet; Refill: 0        Visit Diagnoses:    ICD-10-CM ICD-9-CM   1. Opioid type dependence, continuous  F11.20 304.01   2. Medication management  Z79.899 V58.69   3. Attention deficit hyperactivity disorder (ADHD), predominantly inattentive type  F90.0 314.00   4. Impaired concentration  R41.840 799.51   5. Generalized anxiety disorder  F41.1 300.02   6. Primary insomnia  F51.01 307.42       PLAN:  Safety: No acute safety concerns  Risk Assessment: Risk of self-harm acutely is low. Risk of self-harm chronically is also low, but could be further elevated in the event of treatment noncompliance and/or AODA.    TREATMENT PLAN/GOALS: Continue supportive psychotherapy efforts and medications as indicated. Treatment and medication options  discussed during today's visit. Patient acknowledged and verbally consented to continue with current treatment plan and was educated on the importance of compliance with treatment and follow-up appointments.    MEDICATION ISSUES:  DONG reviewed as expected.  Discussed medication options and treatment plan of prescribed medication as well as the risks, benefits, and side effects including potential falls, possible impaired driving and metabolic adversities among others. Patient is agreeable to call the office with any worsening of symptoms or onset of side effects. Patient is agreeable to call 911 or go to the nearest ER should he/she begin having SI/HI. No medication side effects or related complaints today.     MEDS ORDERED DURING VISIT:  New Medications Ordered This Visit   Medications   • buprenorphine-naloxone (SUBOXONE) 8-2 MG per SL tablet     Sig: Place 2 tablets under the tongue Daily.     Dispense:  58 tablet     Refill:  0     NADEAN:[id number]   • atomoxetine (STRATTERA) 60 MG capsule     Sig: Take 1 capsule by mouth Daily.     Dispense:  30 capsule     Refill:  0   • FLUoxetine (PROzac) 40 MG capsule     Sig: Take 1 capsule by mouth Daily.     Dispense:  30 capsule     Refill:  0   • QUEtiapine (SEROquel) 50 MG tablet     Sig: Take 1 tablet by mouth Every Night.     Dispense:  30 tablet     Refill:  0       No follow-ups on file.           This document has been electronically signed by ALMA Garcia  August 5, 2024 09:56 EDT      Part of this note may be an electronic transcription/translation of spoken language to printed text using the Dragon Dictation System.

## 2024-08-26 ENCOUNTER — PRIOR AUTHORIZATION (OUTPATIENT)
Dept: PSYCHIATRY | Facility: CLINIC | Age: 34
End: 2024-08-26
Payer: COMMERCIAL

## 2024-08-26 DIAGNOSIS — M79.605 LEG PAIN, BILATERAL: ICD-10-CM

## 2024-08-26 DIAGNOSIS — M79.604 LEG PAIN, BILATERAL: ICD-10-CM

## 2024-08-26 RX ORDER — GABAPENTIN 600 MG
600 TABLET ORAL 4 TIMES DAILY
Qty: 120 TABLET | Refills: 0 | Status: CANCELLED | OUTPATIENT
Start: 2024-08-26

## 2024-08-26 RX ORDER — GABAPENTIN 600 MG
600 TABLET ORAL 4 TIMES DAILY
Qty: 120 TABLET | Refills: 0 | Status: SHIPPED | OUTPATIENT
Start: 2024-08-26

## 2024-08-26 NOTE — TELEPHONE ENCOUNTER
Patient stated that the pharmacy will not fill her prescription unless it is written for Gabapentin instead of Neurontin. Can you please resend it for her? Thanks

## 2024-08-27 DIAGNOSIS — Z79.899 MEDICATION MANAGEMENT: Primary | ICD-10-CM

## 2024-08-27 RX ORDER — GABAPENTIN 600 MG/1
600 TABLET ORAL 4 TIMES DAILY
Qty: 120 TABLET | Refills: 0 | Status: SHIPPED | OUTPATIENT
Start: 2024-08-27

## 2024-09-09 ENCOUNTER — TELEMEDICINE (OUTPATIENT)
Dept: PSYCHIATRY | Facility: CLINIC | Age: 34
End: 2024-09-09
Payer: COMMERCIAL

## 2024-09-09 VITALS
HEIGHT: 65 IN | DIASTOLIC BLOOD PRESSURE: 65 MMHG | WEIGHT: 162.2 LBS | HEART RATE: 70 BPM | SYSTOLIC BLOOD PRESSURE: 117 MMHG | BODY MASS INDEX: 27.02 KG/M2

## 2024-09-09 DIAGNOSIS — Z79.899 MEDICATION MANAGEMENT: ICD-10-CM

## 2024-09-09 DIAGNOSIS — R41.840 IMPAIRED CONCENTRATION: ICD-10-CM

## 2024-09-09 DIAGNOSIS — F51.01 PRIMARY INSOMNIA: ICD-10-CM

## 2024-09-09 DIAGNOSIS — F11.20 OPIOID TYPE DEPENDENCE, CONTINUOUS: Primary | ICD-10-CM

## 2024-09-09 DIAGNOSIS — F41.1 GENERALIZED ANXIETY DISORDER: ICD-10-CM

## 2024-09-09 DIAGNOSIS — F90.0 ATTENTION DEFICIT HYPERACTIVITY DISORDER (ADHD), PREDOMINANTLY INATTENTIVE TYPE: ICD-10-CM

## 2024-09-09 PROCEDURE — 1160F RVW MEDS BY RX/DR IN RCRD: CPT | Performed by: NURSE PRACTITIONER

## 2024-09-09 PROCEDURE — 99214 OFFICE O/P EST MOD 30 MIN: CPT | Performed by: NURSE PRACTITIONER

## 2024-09-09 PROCEDURE — 1159F MED LIST DOCD IN RCRD: CPT | Performed by: NURSE PRACTITIONER

## 2024-09-09 RX ORDER — QUETIAPINE FUMARATE 50 MG/1
50 TABLET, FILM COATED ORAL NIGHTLY
Qty: 30 TABLET | Refills: 0 | Status: SHIPPED | OUTPATIENT
Start: 2024-09-09

## 2024-09-09 RX ORDER — CLONIDINE HYDROCHLORIDE 0.1 MG/1
0.1 TABLET ORAL 2 TIMES DAILY
Qty: 180 TABLET | Refills: 0 | Status: SHIPPED | OUTPATIENT
Start: 2024-09-09

## 2024-09-09 RX ORDER — ATOMOXETINE 60 MG/1
60 CAPSULE ORAL DAILY
Qty: 30 CAPSULE | Refills: 0 | Status: SHIPPED | OUTPATIENT
Start: 2024-09-09 | End: 2025-09-09

## 2024-09-09 RX ORDER — BUPRENORPHINE HYDROCHLORIDE AND NALOXONE HYDROCHLORIDE DIHYDRATE 8; 2 MG/1; MG/1
2 TABLET SUBLINGUAL DAILY
Qty: 56 TABLET | Refills: 0 | Status: SHIPPED | OUTPATIENT
Start: 2024-09-09

## 2024-09-09 RX ORDER — FLUOXETINE 40 MG/1
40 CAPSULE ORAL DAILY
Qty: 30 CAPSULE | Refills: 0 | Status: SHIPPED | OUTPATIENT
Start: 2024-09-09 | End: 2025-09-09

## 2024-09-09 RX ORDER — GABAPENTIN 600 MG/1
600 TABLET ORAL 4 TIMES DAILY
Qty: 120 TABLET | Refills: 0 | Status: SHIPPED | OUTPATIENT
Start: 2024-09-09

## 2024-09-09 RX ORDER — HYDROXYZINE PAMOATE 25 MG/1
25 CAPSULE ORAL EVERY 12 HOURS SCHEDULED
Qty: 60 CAPSULE | Refills: 0 | Status: SHIPPED | OUTPATIENT
Start: 2024-09-09

## 2024-09-09 NOTE — PROGRESS NOTES
This provider is located at [facility]. The Patient is seen remotely located at the [facility] ([facility]) using Video. Patient is being seen via telehealth and confirm that they are in a secure environment for this session. The patient's condition being diagnosed/treated is appropriate for telemedicine. Provider identified as Xu Saeed as well as credentials APRN MSN FNP-C [id number]-JULISSA.   The client/patient gave consent to be seen remotely, and when consent is given they understand that the consent allows for patient identifiable information to be sent to a third party as needed.   They may refuse to be seen remotely at any time. The electronic data is encrypted and password protected, and the patient has been advised of the potential risks to privacy not withstanding such measures.    Chief Complaint/History of Present Illness: Follow Up buprenorphine/naloxone Medicated Assisted Treatment for Opiate Use Disorder     Patient/Client Concerns/Updates: Continue Prozac and clonidine for anxiety, Strattera for difficulty concentrating, gabapentin for chronic bilateral leg pain and Seroquel for treatment resistant anxiety and insomnia   -Patient reports no significant or concerning life events or changes since last evaluation; reports no acute life stressors, patient reports she is doing well no changes needed to care plan  -Reports continued therapeutic benefit and satisfaction with current care plan; reports no concerning side effects with current medication prescribed  -Mood is stable; patient denies acute anxious episodes, exacerbations thereof or concerning panic attacks  -Reports no acute depressive episodes or concerns, no suicidal or homicidal thoughts  -Reports no perceptual disturbances or otherwise symptoms of psychosis  -Patient reports no concerns with sleep quality or disturbance thereof    Triggers (Persons/Places/Things/Events/Thought/Emotions): Generalized anxiety difficulty  concentrating chronic leg pain insomnia    Cravings/Substance Use: Denies cravings or use of illicit substances    Relapse Prevention: Counseling    Urine Drug Screen (today's visit) discussed: Positive buprenorphine, otherwise negative for substances tested    UDS Confirmation (Most recent/Resulted): Positive buprenorphine/nor-buprenorphine, no concerns for urine tampering otherwise positive gabapentin and methamphetamine    Most recent pertinent laboratory studies reviewed: 10/24/2022-hepatic function within normal limits, Cr WNL      DONG (PDMP) Reviewed for Current/Active Medications: buprenorphine/naloxone and gabapentin as reviewed today    Past Surgical History:  Past Surgical History:   Procedure Laterality Date   • APPENDECTOMY     •  SECTION     • ENDOSCOPY N/A 10/28/2022    Procedure: ESOPHAGOGASTRODUODENOSCOPY;  Surgeon: Jayro Bar MD;  Location: [facility];  Service: Gastroenterology;  Laterality: N/A;       Problem List:  Patient Active Problem List   Diagnosis   • Esophageal dysphagia   • Iron deficiency anemia, unspecified   • Malabsorption of iron       Allergy:   Allergies   Allergen Reactions   • Atomoxetine Other (See Comments)     Skin crawling-per patient   • Diphenhydramine    • Sulfamethoxazole-Trimethoprim         Current Medications:   Current Outpatient Medications   Medication Sig Dispense Refill   • albuterol sulfate  (90 Base) MCG/ACT inhaler      • atomoxetine (STRATTERA) 60 MG capsule Take 1 capsule by mouth Daily. 30 capsule 0   • buprenorphine-naloxone (SUBOXONE) 8-2 MG per SL tablet Place 2 tablets under the tongue Daily. 56 tablet 0   • cloNIDine (CATAPRES) 0.1 MG tablet Take 1 tablet by mouth 2 (Two) Times a Day. 180 tablet 0   • Diclofenac Sodium (VOLTAREN) 1 % gel gel APPLY TOPICALLY TO THE AFFECTED AREA ON THE HANDS 3 TIMES DAILY     • estradiol cypionate (DEPO-ESTRADIOL) 5 MG/ML injection Inject  into the appropriate muscle as directed by prescriber  Every 28 (Twenty-Eight) Days.     • FLUoxetine (PROzac) 40 MG capsule Take 1 capsule by mouth Daily. 30 capsule 0   • gabapentin (NEURONTIN) 600 MG tablet Take 1 tablet by mouth 4 (Four) Times a Day. 120 tablet 0   • hydrOXYzine pamoate (VISTARIL) 25 MG capsule Take 1 capsule by mouth Every 12 (Twelve) Hours. 60 capsule 0   • ibuprofen (ADVIL,MOTRIN) 200 MG tablet Take 4 tablets by mouth Every 12 (Twelve) Hours.     • metroNIDAZOLE (FLAGYL) 500 MG tablet      • Neurontin 600 MG tablet Take 1 tablet by mouth 4 (Four) Times a Day. 120 tablet 0   • pantoprazole (PROTONIX) 40 MG EC tablet Take 1 tablet by mouth Daily. 30 tablet 0   • QUEtiapine (SEROquel) 50 MG tablet Take 1 tablet by mouth Every Night. 30 tablet 0   • naloxone (NARCAN) 4 MG/0.1ML nasal spray 1 spray into the nostril(s) as directed by provider As Needed (opiate over sedation). 1 spray in 1 nostril every 2 to 3 minutes call 911 (Patient not taking: Reported on 9/9/2024) 2 each 2     No current facility-administered medications for this visit.       Past Medical History:  Past Medical History:   Diagnosis Date   • Acid reflux    • Anemia    • Anxiety    • Substance abuse          Social History     Socioeconomic History   • Marital status: Single   Tobacco Use   • Smoking status: Every Day     Current packs/day: 1.00     Average packs/day: 1 pack/day for 7.0 years (7.0 ttl pk-yrs)     Types: Cigarettes     Passive exposure: Current   • Smokeless tobacco: Never   Vaping Use   • Vaping status: Never Used   Substance and Sexual Activity   • Alcohol use: Not Currently   • Drug use: Not Currently     Types: Methamphetamines     Comment: per patient has been clean for about 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   • Sexual activity: Defer     Birth control/protection: Depo-provera         Family History   Problem Relation Age of Onset   • Heart disease Mother    • Heart disease Father    • Cancer Maternal Grandmother    • Cancer Maternal Grandfather          Mental Status Exam:    Hygiene:   good  Cooperation:  Cooperative  Eye Contact:  Good  Psychomotor Behavior:  Appropriate  Affect:  Appropriate  Mood: normal  Speech:  Normal  Thought Process:  Goal directed  Thought Content:  Normal  Suicidal:  None  Homicidal:  None  Hallucinations:  None  Delusion:  None  Memory:  Intact  Orientation:  Grossly intact  Reliability:  good  Insight:  Good  Judgement:  Good  Impulse Control:  Good         Review of Systems:  Review of Systems   Constitutional:  Negative for activity change, chills, diaphoresis and fatigue.   Respiratory:  Negative for apnea, cough and shortness of breath.    Cardiovascular:  Negative for chest pain, palpitations and leg swelling.   Gastrointestinal:  Negative for abdominal pain, constipation, diarrhea, nausea and vomiting.   Genitourinary:  Negative for difficulty urinating.   Musculoskeletal:  Positive for arthralgias.   Skin:  Negative for rash.   Neurological:  Negative for dizziness, weakness and headaches.   Psychiatric/Behavioral:  Positive for decreased concentration and sleep disturbance. Negative for agitation, self-injury and suicidal ideas. The patient is nervous/anxious.        Physical Exam:  Physical Exam  Vitals reviewed.   Constitutional:       General: She is not in acute distress.     Appearance: Normal appearance. She is not ill-appearing or toxic-appearing.   Pulmonary:      Effort: Pulmonary effort is normal.   Musculoskeletal:         General: Normal range of motion.   Neurological:      General: No focal deficit present.      Mental Status: She is alert and oriented to person, place, and time.   Psychiatric:         Attention and Perception: Attention and perception normal.         Mood and Affect: Mood normal. Mood is not anxious or depressed.         Speech: Speech normal.         Behavior: Behavior normal. Behavior is cooperative.         Thought Content: Thought content normal.         Cognition and Memory: Cognition and memory normal.          "Judgment: Judgment normal.     Vital Signs:   /65   Pulse 70   Ht 165.1 cm (65\")   Wt 73.6 kg (162 lb 3.2 oz)   BMI 26.99 kg/m²      Lab Results:   Telemedicine on 09/09/2024   Component Date Value Ref Range Status   • External Amphetamine Screen Urine 09/09/2024 Negative   Final   • External Benzodiazepine Screen Uri* 09/09/2024 Negative   Final   • External Cocaine Screen Urine 09/09/2024 Negative   Final   • External THC Screen Urine 09/09/2024 Negative   Final   • External Methadone Screen Urine 09/09/2024 Negative   Final   • External Methamphetamine Screen Ur* 09/09/2024 Negative   Final   • External Oxycodone Screen Urine 09/09/2024 Negative   Final   • External Buprenorphine Screen Urine 09/09/2024 Positive (A)   Final   • External MDMA 09/09/2024 Negative   Final   • External Opiates Screen Urine 09/09/2024 Negative   Final   Telemedicine on 08/05/2024   Component Date Value Ref Range Status   • External Amphetamine Screen Urine 08/05/2024 Positive (A)   Final   • External Benzodiazepine Screen Uri* 08/05/2024 Negative   Final   • External Cocaine Screen Urine 08/05/2024 Negative   Final   • External THC Screen Urine 08/05/2024 Negative   Final   • External Methadone Screen Urine 08/05/2024 Negative   Final   • External Methamphetamine Screen Ur* 08/05/2024 Positive (A)   Final   • External Oxycodone Screen Urine 08/05/2024 Negative   Final   • External Buprenorphine Screen Urine 08/05/2024 Positive (A)   Final   • External MDMA 08/05/2024 Negative   Final   • External Opiates Screen Urine 08/05/2024 Negative   Final   Office Visit on 07/08/2024   Component Date Value Ref Range Status   • External Amphetamine Screen Urine 07/08/2024 Negative   Final   • External Benzodiazepine Screen Uri* 07/08/2024 Negative   Final   • External Cocaine Screen Urine 07/08/2024 Negative   Final   • External THC Screen Urine 07/08/2024 Negative   Final   • External Methadone Screen Urine 07/08/2024 Negative   Final "   • External Methamphetamine Screen Ur* 07/08/2024 Negative   Final   • External Oxycodone Screen Urine 07/08/2024 Negative   Final   • External Buprenorphine Screen Urine 07/08/2024 Positive (A)   Final   • External MDMA 07/08/2024 Negative   Final   • External Opiates Screen Urine 07/08/2024 Negative   Final   Telemedicine on 06/06/2024   Component Date Value Ref Range Status   • External Amphetamine Screen Urine 06/06/2024 Negative   Final   • External Benzodiazepine Screen Uri* 06/06/2024 Positive (A)   Final   • External Cocaine Screen Urine 06/06/2024 Negative   Final   • External THC Screen Urine 06/06/2024 Negative   Final   • External Methadone Screen Urine 06/06/2024 Negative   Final   • External Methamphetamine Screen Ur* 06/06/2024 Negative   Final   • External Oxycodone Screen Urine 06/06/2024 Negative   Final   • External Buprenorphine Screen Urine 06/06/2024 Positive (A)   Final   • External MDMA 06/06/2024 Negative   Final   • External Opiates Screen Urine 06/06/2024 Negative   Final   Telemedicine on 04/30/2024   Component Date Value Ref Range Status   • External Amphetamine Screen Urine 04/30/2024 Negative   Final   • External Benzodiazepine Screen Uri* 04/30/2024 Negative   Final   • External Cocaine Screen Urine 04/30/2024 Negative   Final   • External THC Screen Urine 04/30/2024 Negative   Final   • External Methadone Screen Urine 04/30/2024 Negative   Final   • External Methamphetamine Screen Ur* 04/30/2024 Negative   Final   • External Oxycodone Screen Urine 04/30/2024 Negative   Final   • External Buprenorphine Screen Urine 04/30/2024 Positive   Final   • External MDMA 04/30/2024 Negative   Final   • External Opiates Screen Urine 04/30/2024 Negative   Final         Assessment & Plan   Diagnoses and all orders for this visit:    1. Opioid type dependence, continuous (Primary)  -     buprenorphine-naloxone (SUBOXONE) 8-2 MG per SL tablet; Place 2 tablets under the tongue Daily.  Dispense: 56  tablet; Refill: 0    2. Medication management  -     [facility] Drug Screen  -     gabapentin (NEURONTIN) 600 MG tablet; Take 1 tablet by mouth 4 (Four) Times a Day.  Dispense: 120 tablet; Refill: 0    3. Attention deficit hyperactivity disorder (ADHD), predominantly inattentive type  -     atomoxetine (STRATTERA) 60 MG capsule; Take 1 capsule by mouth Daily.  Dispense: 30 capsule; Refill: 0    4. Impaired concentration  -     atomoxetine (STRATTERA) 60 MG capsule; Take 1 capsule by mouth Daily.  Dispense: 30 capsule; Refill: 0    5. Generalized anxiety disorder  -     FLUoxetine (PROzac) 40 MG capsule; Take 1 capsule by mouth Daily.  Dispense: 30 capsule; Refill: 0  -     QUEtiapine (SEROquel) 50 MG tablet; Take 1 tablet by mouth Every Night.  Dispense: 30 tablet; Refill: 0  -     hydrOXYzine pamoate (VISTARIL) 25 MG capsule; Take 1 capsule by mouth Every 12 (Twelve) Hours.  Dispense: 60 capsule; Refill: 0  -     cloNIDine (CATAPRES) 0.1 MG tablet; Take 1 tablet by mouth 2 (Two) Times a Day.  Dispense: 180 tablet; Refill: 0    6. Primary insomnia  -     QUEtiapine (SEROquel) 50 MG tablet; Take 1 tablet by mouth Every Night.  Dispense: 30 tablet; Refill: 0        Visit Diagnoses:    ICD-10-CM ICD-9-CM   1. Opioid type dependence, continuous  F11.20 304.01   2. Medication management  Z79.899 V58.69   3. Attention deficit hyperactivity disorder (ADHD), predominantly inattentive type  F90.0 314.00   4. Impaired concentration  R41.840 799.51   5. Generalized anxiety disorder  F41.1 300.02   6. Primary insomnia  F51.01 307.42       PLAN:  Safety: No acute safety concerns  Risk Assessment: Risk of self-harm acutely is low. Risk of self-harm chronically is also low, but could be further elevated in the event of treatment noncompliance and/or AODA.    TREATMENT PLAN/GOALS: Continue supportive psychotherapy efforts and medications as indicated. Treatment and medication options discussed during today's visit. Patient  acknowledged and verbally consented to continue with current treatment plan and was educated on the importance of compliance with treatment and follow-up appointments.    MEDICATION ISSUES:  DONG reviewed as expected.  Discussed medication options and treatment plan of prescribed medication as well as the risks, benefits, and side effects including potential falls, possible impaired driving and metabolic adversities among others. Patient is agreeable to call the office with any worsening of symptoms or onset of side effects. Patient is agreeable to call 911 or go to the nearest ER should he/she begin having SI/HI. No medication side effects or related complaints today.     MEDS ORDERED DURING VISIT:  New Medications Ordered This Visit   Medications   • buprenorphine-naloxone (SUBOXONE) 8-2 MG per SL tablet     Sig: Place 2 tablets under the tongue Daily.     Dispense:  56 tablet     Refill:  0     NATALIIA:[id number]   • atomoxetine (STRATTERA) 60 MG capsule     Sig: Take 1 capsule by mouth Daily.     Dispense:  30 capsule     Refill:  0   • FLUoxetine (PROzac) 40 MG capsule     Sig: Take 1 capsule by mouth Daily.     Dispense:  30 capsule     Refill:  0   • gabapentin (NEURONTIN) 600 MG tablet     Sig: Take 1 tablet by mouth 4 (Four) Times a Day.     Dispense:  120 tablet     Refill:  0   • QUEtiapine (SEROquel) 50 MG tablet     Sig: Take 1 tablet by mouth Every Night.     Dispense:  30 tablet     Refill:  0   • hydrOXYzine pamoate (VISTARIL) 25 MG capsule     Sig: Take 1 capsule by mouth Every 12 (Twelve) Hours.     Dispense:  60 capsule     Refill:  0   • cloNIDine (CATAPRES) 0.1 MG tablet     Sig: Take 1 tablet by mouth 2 (Two) Times a Day.     Dispense:  180 tablet     Refill:  0       No follow-ups on file.           This document has been electronically signed by ALMA Garcia  September 9, 2024 16:14 EDT      Part of this note may be an electronic transcription/translation of spoken language to printed  text using the Dragon Dictation System.

## 2024-10-08 DIAGNOSIS — F41.1 GENERALIZED ANXIETY DISORDER: ICD-10-CM

## 2024-10-08 DIAGNOSIS — F51.01 PRIMARY INSOMNIA: ICD-10-CM

## 2024-10-08 DIAGNOSIS — R41.840 IMPAIRED CONCENTRATION: ICD-10-CM

## 2024-10-08 DIAGNOSIS — F90.0 ATTENTION DEFICIT HYPERACTIVITY DISORDER (ADHD), PREDOMINANTLY INATTENTIVE TYPE: ICD-10-CM

## 2024-10-08 RX ORDER — QUETIAPINE FUMARATE 50 MG/1
50 TABLET, FILM COATED ORAL NIGHTLY
Qty: 30 TABLET | Refills: 0 | Status: SHIPPED | OUTPATIENT
Start: 2024-10-08

## 2024-10-08 RX ORDER — FLUOXETINE 40 MG/1
40 CAPSULE ORAL DAILY
Qty: 30 CAPSULE | Refills: 0 | Status: SHIPPED | OUTPATIENT
Start: 2024-10-08 | End: 2025-10-08

## 2024-10-08 RX ORDER — ATOMOXETINE 60 MG/1
60 CAPSULE ORAL DAILY
Qty: 30 CAPSULE | Refills: 0 | Status: SHIPPED | OUTPATIENT
Start: 2024-10-08 | End: 2025-10-08

## 2024-10-16 ENCOUNTER — TELEMEDICINE (OUTPATIENT)
Dept: PSYCHIATRY | Facility: CLINIC | Age: 34
End: 2024-10-16
Payer: COMMERCIAL

## 2024-10-16 VITALS
HEART RATE: 93 BPM | SYSTOLIC BLOOD PRESSURE: 130 MMHG | HEIGHT: 65 IN | BODY MASS INDEX: 27.49 KG/M2 | WEIGHT: 165 LBS | DIASTOLIC BLOOD PRESSURE: 71 MMHG

## 2024-10-16 DIAGNOSIS — R41.840 IMPAIRED CONCENTRATION: ICD-10-CM

## 2024-10-16 DIAGNOSIS — F41.1 GENERALIZED ANXIETY DISORDER: ICD-10-CM

## 2024-10-16 DIAGNOSIS — F51.01 PRIMARY INSOMNIA: ICD-10-CM

## 2024-10-16 DIAGNOSIS — Z79.899 MEDICATION MANAGEMENT: ICD-10-CM

## 2024-10-16 DIAGNOSIS — F90.0 ATTENTION DEFICIT HYPERACTIVITY DISORDER (ADHD), PREDOMINANTLY INATTENTIVE TYPE: ICD-10-CM

## 2024-10-16 DIAGNOSIS — F11.20 OPIOID TYPE DEPENDENCE, CONTINUOUS: Primary | ICD-10-CM

## 2024-10-16 LAB
AMPHET+METHAMPHET UR QL: NEGATIVE
AMPHETAMINES UR QL: NEGATIVE
BARBITURATES UR QL SCN: NEGATIVE
BENZODIAZ UR QL SCN: NEGATIVE
BUPRENORPHINE SERPL-MCNC: POSITIVE NG/ML
CANNABINOIDS SERPL QL: NEGATIVE
COCAINE UR QL: NEGATIVE
ETHANOL URINE SCREEN: NEGATIVE
FENTANYL SERPL-MCNC: NEGATIVE UG/ML
GABAPENTIN UR-MCNC: NEGATIVE UG/ML
MDMA UR QL SCN: NEGATIVE
METHADONE UR QL SCN: NEGATIVE
MORPHINE/OPIATES SCREEN, URINE: NEGATIVE
OXYCODONE UR QL SCN: NEGATIVE
PCP UR QL SCN: NEGATIVE
PROPOXYPH UR QL SCN: NEGATIVE
TRICYCLICS UR QL SCN: NEGATIVE

## 2024-10-16 RX ORDER — GABAPENTIN 600 MG/1
600 TABLET ORAL 4 TIMES DAILY
Qty: 120 TABLET | Refills: 0 | Status: SHIPPED | OUTPATIENT
Start: 2024-10-16

## 2024-10-16 RX ORDER — MEDROXYPROGESTERONE ACETATE 150 MG/ML
150 INJECTION, SUSPENSION INTRAMUSCULAR
COMMUNITY
Start: 2024-10-14

## 2024-10-16 RX ORDER — FLUOXETINE 40 MG/1
40 CAPSULE ORAL DAILY
Qty: 30 CAPSULE | Refills: 0 | Status: SHIPPED | OUTPATIENT
Start: 2024-10-16 | End: 2025-10-16

## 2024-10-16 RX ORDER — CLONIDINE HYDROCHLORIDE 0.1 MG/1
0.1 TABLET ORAL 2 TIMES DAILY
Qty: 180 TABLET | Refills: 0 | Status: SHIPPED | OUTPATIENT
Start: 2024-10-16

## 2024-10-16 RX ORDER — BUPRENORPHINE HYDROCHLORIDE AND NALOXONE HYDROCHLORIDE DIHYDRATE 8; 2 MG/1; MG/1
2 TABLET SUBLINGUAL DAILY
Qty: 56 TABLET | Refills: 0 | Status: SHIPPED | OUTPATIENT
Start: 2024-10-16

## 2024-10-16 RX ORDER — QUETIAPINE FUMARATE 50 MG/1
50 TABLET, FILM COATED ORAL NIGHTLY
Qty: 30 TABLET | Refills: 0 | Status: SHIPPED | OUTPATIENT
Start: 2024-10-16

## 2024-10-16 RX ORDER — ATOMOXETINE 60 MG/1
60 CAPSULE ORAL DAILY
Qty: 30 CAPSULE | Refills: 0 | Status: SHIPPED | OUTPATIENT
Start: 2024-10-16 | End: 2025-10-16

## 2024-10-16 NOTE — PROGRESS NOTES
This provider is located at [facility]. The Patient is seen remotely located at the [facility] ([facility]) using Video. Patient is being seen via telehealth and confirm that they are in a secure environment for this session. The patient's condition being diagnosed/treated is appropriate for telemedicine. Provider identified as Xu Saeed as well as credentials APRN MSN FNP-C [id number]-JULISSA.   The client/patient gave consent to be seen remotely, and when consent is given they understand that the consent allows for patient identifiable information to be sent to a third party as needed.   They may refuse to be seen remotely at any time. The electronic data is encrypted and password protected, and the patient has been advised of the potential risks to privacy not withstanding such measures.    Chief Complaint/History of Present Illness: Follow Up buprenorphine/naloxone Medicated Assisted Treatment for Opiate Use Disorder     Patient/Client Concerns/Updates: Continue Prozac and clonidine for anxiety, Strattera for difficulty concentrating, gabapentin for chronic bilateral leg pain and Seroquel for treatment resistant anxiety and insomnia   -Patient reports no significant or concerning life events or changes since last evaluation; reports no acute life stressors patient reports she is doing well and has no pressing concerns today continue current care plan  -Reports continued therapeutic benefit and satisfaction with current care plan; reports no concerning side effects with current medication prescribed  -Mood is stable; patient denies acute anxious episodes, exacerbations thereof or concerning panic attacks  -Reports no acute depressive episodes or concerns, no suicidal or homicidal thoughts  -Reports no perceptual disturbances or otherwise symptoms of psychosis  -Patient reports no concerns with sleep quality or disturbance thereof    Triggers (Persons/Places/Things/Events/Thought/Emotions):  Generalized anxiety difficulty concentrating insomnia and chronic leg pain    Cravings/Substance Use: Denies cravings or use of illicit substances    Relapse Prevention: Counseling    Urine Drug Screen (today's visit)/Presumptive Point of Care discussed: Positive buprenorphine, otherwise negative for substances tested    UDS Confirmation (Most recent/Resulted): Positive buprenorphine/nor-buprenorphine, no concerns for urine tampering otherwise positive gabapentin and amphetamine, methamphetamine negative    Most recent pertinent laboratory studies reviewed: 10/24/2022-hepatic function within normal limits, Cr WNL     DONG (PDMP) Reviewed for Current/Active Medications: buprenorphine/naloxone and gabapentin as reviewed today    Past Surgical History:  Past Surgical History:   Procedure Laterality Date   • APPENDECTOMY     •  SECTION     • ENDOSCOPY N/A 10/28/2022    Procedure: ESOPHAGOGASTRODUODENOSCOPY;  Surgeon: Jayro Bar MD;  Location: [facility];  Service: Gastroenterology;  Laterality: N/A;       Problem List:  Patient Active Problem List   Diagnosis   • Esophageal dysphagia   • Iron deficiency anemia, unspecified   • Malabsorption of iron       Allergy:   Allergies   Allergen Reactions   • Atomoxetine Other (See Comments)     Skin crawling-per patient   • Diphenhydramine    • Sulfamethoxazole-Trimethoprim         Current Medications:   Current Outpatient Medications   Medication Sig Dispense Refill   • albuterol sulfate  (90 Base) MCG/ACT inhaler      • atomoxetine (STRATTERA) 60 MG capsule Take 1 capsule by mouth Daily. 30 capsule 0   • buprenorphine-naloxone (SUBOXONE) 8-2 MG per SL tablet Place 2 tablets under the tongue Daily. 56 tablet 0   • cloNIDine (CATAPRES) 0.1 MG tablet Take 1 tablet by mouth 2 (Two) Times a Day. 180 tablet 0   • Diclofenac Sodium (VOLTAREN) 1 % gel gel APPLY TOPICALLY TO THE AFFECTED AREA ON THE HANDS 3 TIMES DAILY     • estradiol cypionate (DEPO-ESTRADIOL) 5  MG/ML injection Inject  into the appropriate muscle as directed by prescriber Every 28 (Twenty-Eight) Days.     • FLUoxetine (PROzac) 40 MG capsule Take 1 capsule by mouth Daily. 30 capsule 0   • gabapentin (NEURONTIN) 600 MG tablet Take 1 tablet by mouth 4 (Four) Times a Day. 120 tablet 0   • hydrOXYzine pamoate (VISTARIL) 25 MG capsule Take 1 capsule by mouth Every 12 (Twelve) Hours. 60 capsule 0   • ibuprofen (ADVIL,MOTRIN) 200 MG tablet Take 4 tablets by mouth Every 12 (Twelve) Hours.     • medroxyPROGESTERone (DEPO-PROVERA) 150 MG/ML injection Inject 1 mL into the appropriate muscle as directed by prescriber Every 3 (Three) Months.     • metroNIDAZOLE (FLAGYL) 500 MG tablet      • naloxone (NARCAN) 4 MG/0.1ML nasal spray 1 spray into the nostril(s) as directed by provider As Needed (opiate over sedation). 1 spray in 1 nostril every 2 to 3 minutes call 911 2 each 2   • Neurontin 600 MG tablet Take 1 tablet by mouth 4 (Four) Times a Day. 120 tablet 0   • pantoprazole (PROTONIX) 40 MG EC tablet Take 1 tablet by mouth Daily. 30 tablet 0   • QUEtiapine (SEROquel) 50 MG tablet Take 1 tablet by mouth Every Night. 30 tablet 0     No current facility-administered medications for this visit.       Past Medical History:  Past Medical History:   Diagnosis Date   • Acid reflux    • Anemia    • Anxiety    • Substance abuse          Social History     Socioeconomic History   • Marital status: Single   Tobacco Use   • Smoking status: Every Day     Current packs/day: 1.00     Average packs/day: 1 pack/day for 7.0 years (7.0 ttl pk-yrs)     Types: Cigarettes     Passive exposure: Current   • Smokeless tobacco: Never   Vaping Use   • Vaping status: Never Used   Substance and Sexual Activity   • Alcohol use: Not Currently   • Drug use: Not Currently     Types: Methamphetamines     Comment: per patient has been clean for about 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   • Sexual activity: Defer     Birth control/protection: Depo-provera         Family  History   Problem Relation Age of Onset   • Heart disease Mother    • Heart disease Father    • Cancer Maternal Grandmother    • Cancer Maternal Grandfather          Mental Status Exam:   Hygiene:   good  Cooperation:  Cooperative  Eye Contact:  Good  Psychomotor Behavior:  Appropriate  Affect:  Appropriate  Mood: normal  Speech:  Normal  Thought Process:  Goal directed  Thought Content:  Normal  Suicidal:  None  Homicidal:  None  Hallucinations:  None  Delusion:  None  Memory:  Intact  Orientation:  Grossly intact  Reliability:  good  Insight:  Good  Judgement:  Good  Impulse Control:  Good         Review of Systems:  Review of Systems   Constitutional:  Negative for activity change, chills, diaphoresis and fatigue.   Respiratory:  Negative for apnea, cough and shortness of breath.    Cardiovascular:  Negative for chest pain, palpitations and leg swelling.   Gastrointestinal:  Negative for abdominal pain, constipation, diarrhea, nausea and vomiting.   Genitourinary:  Negative for difficulty urinating.   Musculoskeletal:  Negative for arthralgias.   Skin:  Negative for rash.   Neurological:  Negative for dizziness, weakness and headaches.   Psychiatric/Behavioral:  Positive for decreased concentration and sleep disturbance. Negative for agitation, self-injury and suicidal ideas. The patient is nervous/anxious.        Physical Exam:  Physical Exam  Vitals reviewed.   Constitutional:       General: She is not in acute distress.     Appearance: Normal appearance. She is not ill-appearing or toxic-appearing.   Pulmonary:      Effort: Pulmonary effort is normal.   Musculoskeletal:         General: Normal range of motion.   Neurological:      General: No focal deficit present.      Mental Status: She is alert and oriented to person, place, and time.   Psychiatric:         Attention and Perception: Attention and perception normal.         Mood and Affect: Mood normal. Mood is not anxious or depressed.         Speech:  "Speech normal.         Behavior: Behavior normal. Behavior is cooperative.         Thought Content: Thought content normal.         Cognition and Memory: Cognition and memory normal.         Judgment: Judgment normal.     Vital Signs:   /71   Pulse 93   Ht 165.1 cm (65\")   Wt 74.8 kg (165 lb)   BMI 27.46 kg/m²      Lab Results:   Telemedicine on 10/16/2024   Component Date Value Ref Range Status   • Amphetamine Screen, Urine 10/16/2024 Negative  Negative Final    PRELIMINARY RESULTS. PLEASE REFER TO [facility] LAB CONFIRMATION FOR FINAL RESULTS   • Barbiturates Screen, Urine 10/16/2024 Negative  Negative Final   • Buprenorphine, Screen, Urine 10/16/2024 Positive (A)  Negative Final   • Benzodiazepine Screen, Urine 10/16/2024 Negative  Negative Final   • Cocaine Screen, Urine 10/16/2024 Negative  Negative Final   • MDMA (ECSTASY) 10/16/2024 Negative  Negative Final   • Methamphetamine, Ur 10/16/2024 Negative  Negative Final   • Methadone Screen, Urine 10/16/2024 Negative  Negative Final   • Morphine/Opiates Screen, Urine 10/16/2024 Negative  Negative Final   • Oxycodone Screen, Urine 10/16/2024 Negative  Negative Final   • Phencyclidine (PCP), Urine 10/16/2024 Negative  Negative Final   • Propoxyphene Scree, Urine 10/16/2024 Negative  Negative Final   • Tricyclic Antidepressants Screen 10/16/2024 Negative  Negative Final   • THC, Screen, Urine 10/16/2024 Negative  Negative Final   • ETHANOL URINE SCREEN 10/16/2024 Negative   Final   • Fentanyl 10/16/2024 NEGATIVE   Final   • Gabapentin, Ur 10/16/2024 NEGATIVE   Final   Telemedicine on 09/09/2024   Component Date Value Ref Range Status   • External Amphetamine Screen Urine 09/09/2024 Negative   Final   • External Benzodiazepine Screen Uri* 09/09/2024 Negative   Final   • External Cocaine Screen Urine 09/09/2024 Negative   Final   • External THC Screen Urine 09/09/2024 Negative   Final   • External Methadone Screen Urine 09/09/2024 Negative   Final   • External " Methamphetamine Screen Ur* 09/09/2024 Negative   Final   • External Oxycodone Screen Urine 09/09/2024 Negative   Final   • External Buprenorphine Screen Urine 09/09/2024 Positive (A)   Final   • External MDMA 09/09/2024 Negative   Final   • External Opiates Screen Urine 09/09/2024 Negative   Final   Telemedicine on 08/05/2024   Component Date Value Ref Range Status   • External Amphetamine Screen Urine 08/05/2024 Positive (A)   Final   • External Benzodiazepine Screen Uri* 08/05/2024 Negative   Final   • External Cocaine Screen Urine 08/05/2024 Negative   Final   • External THC Screen Urine 08/05/2024 Negative   Final   • External Methadone Screen Urine 08/05/2024 Negative   Final   • External Methamphetamine Screen Ur* 08/05/2024 Positive (A)   Final   • External Oxycodone Screen Urine 08/05/2024 Negative   Final   • External Buprenorphine Screen Urine 08/05/2024 Positive (A)   Final   • External MDMA 08/05/2024 Negative   Final   • External Opiates Screen Urine 08/05/2024 Negative   Final   Office Visit on 07/08/2024   Component Date Value Ref Range Status   • External Amphetamine Screen Urine 07/08/2024 Negative   Final   • External Benzodiazepine Screen Uri* 07/08/2024 Negative   Final   • External Cocaine Screen Urine 07/08/2024 Negative   Final   • External THC Screen Urine 07/08/2024 Negative   Final   • External Methadone Screen Urine 07/08/2024 Negative   Final   • External Methamphetamine Screen Ur* 07/08/2024 Negative   Final   • External Oxycodone Screen Urine 07/08/2024 Negative   Final   • External Buprenorphine Screen Urine 07/08/2024 Positive (A)   Final   • External MDMA 07/08/2024 Negative   Final   • External Opiates Screen Urine 07/08/2024 Negative   Final   Telemedicine on 06/06/2024   Component Date Value Ref Range Status   • External Amphetamine Screen Urine 06/06/2024 Negative   Final   • External Benzodiazepine Screen Uri* 06/06/2024 Positive (A)   Final   • External Cocaine Screen Urine  06/06/2024 Negative   Final   • External THC Screen Urine 06/06/2024 Negative   Final   • External Methadone Screen Urine 06/06/2024 Negative   Final   • External Methamphetamine Screen Ur* 06/06/2024 Negative   Final   • External Oxycodone Screen Urine 06/06/2024 Negative   Final   • External Buprenorphine Screen Urine 06/06/2024 Positive (A)   Final   • External MDMA 06/06/2024 Negative   Final   • External Opiates Screen Urine 06/06/2024 Negative   Final   Telemedicine on 04/30/2024   Component Date Value Ref Range Status   • External Amphetamine Screen Urine 04/30/2024 Negative   Final   • External Benzodiazepine Screen Uri* 04/30/2024 Negative   Final   • External Cocaine Screen Urine 04/30/2024 Negative   Final   • External THC Screen Urine 04/30/2024 Negative   Final   • External Methadone Screen Urine 04/30/2024 Negative   Final   • External Methamphetamine Screen Ur* 04/30/2024 Negative   Final   • External Oxycodone Screen Urine 04/30/2024 Negative   Final   • External Buprenorphine Screen Urine 04/30/2024 Positive   Final   • External MDMA 04/30/2024 Negative   Final   • External Opiates Screen Urine 04/30/2024 Negative   Final         Assessment & Plan   Diagnoses and all orders for this visit:    1. Opioid type dependence, continuous (Primary)  -     buprenorphine-naloxone (SUBOXONE) 8-2 MG per SL tablet; Place 2 tablets under the tongue Daily.  Dispense: 56 tablet; Refill: 0    2. Medication management  -     POC Medline 14 Panel Urine Drug Screen  -     gabapentin (NEURONTIN) 600 MG tablet; Take 1 tablet by mouth 4 (Four) Times a Day.  Dispense: 120 tablet; Refill: 0    3. Attention deficit hyperactivity disorder (ADHD), predominantly inattentive type  -     atomoxetine (STRATTERA) 60 MG capsule; Take 1 capsule by mouth Daily.  Dispense: 30 capsule; Refill: 0    4. Impaired concentration  -     atomoxetine (STRATTERA) 60 MG capsule; Take 1 capsule by mouth Daily.  Dispense: 30 capsule; Refill: 0    5.  Generalized anxiety disorder  -     cloNIDine (CATAPRES) 0.1 MG tablet; Take 1 tablet by mouth 2 (Two) Times a Day.  Dispense: 180 tablet; Refill: 0  -     FLUoxetine (PROzac) 40 MG capsule; Take 1 capsule by mouth Daily.  Dispense: 30 capsule; Refill: 0  -     QUEtiapine (SEROquel) 50 MG tablet; Take 1 tablet by mouth Every Night.  Dispense: 30 tablet; Refill: 0    6. Primary insomnia  -     QUEtiapine (SEROquel) 50 MG tablet; Take 1 tablet by mouth Every Night.  Dispense: 30 tablet; Refill: 0        Visit Diagnoses:    ICD-10-CM ICD-9-CM   1. Opioid type dependence, continuous  F11.20 304.01   2. Medication management  Z79.899 V58.69   3. Attention deficit hyperactivity disorder (ADHD), predominantly inattentive type  F90.0 314.00   4. Impaired concentration  R41.840 799.51   5. Generalized anxiety disorder  F41.1 300.02   6. Primary insomnia  F51.01 307.42       PLAN:  Safety: No acute safety concerns  Risk Assessment: Risk of self-harm acutely is low. Risk of self-harm chronically is also low, but could be further elevated in the event of treatment noncompliance and/or AODA.    TREATMENT PLAN/GOALS: Continue supportive psychotherapy efforts and medications as indicated. Treatment and medication options discussed during today's visit. Patient acknowledged and verbally consented to continue with current treatment plan and was educated on the importance of compliance with treatment and follow-up appointments.    MEDICATION ISSUES:  DONG reviewed as expected.  Discussed medication options and treatment plan of prescribed medication as well as the risks, benefits, and side effects including potential falls, possible impaired driving and metabolic adversities among others. Patient is agreeable to call the office with any worsening of symptoms or onset of side effects. Patient is agreeable to call 911 or go to the nearest ER should he/she begin having SI/HI. No medication side effects or related complaints today.      MEDS ORDERED DURING VISIT:  New Medications Ordered This Visit   Medications   • buprenorphine-naloxone (SUBOXONE) 8-2 MG per SL tablet     Sig: Place 2 tablets under the tongue Daily.     Dispense:  56 tablet     Refill:  0     NATALIIA:[id number]   • atomoxetine (STRATTERA) 60 MG capsule     Sig: Take 1 capsule by mouth Daily.     Dispense:  30 capsule     Refill:  0   • cloNIDine (CATAPRES) 0.1 MG tablet     Sig: Take 1 tablet by mouth 2 (Two) Times a Day.     Dispense:  180 tablet     Refill:  0   • FLUoxetine (PROzac) 40 MG capsule     Sig: Take 1 capsule by mouth Daily.     Dispense:  30 capsule     Refill:  0   • gabapentin (NEURONTIN) 600 MG tablet     Sig: Take 1 tablet by mouth 4 (Four) Times a Day.     Dispense:  120 tablet     Refill:  0   • QUEtiapine (SEROquel) 50 MG tablet     Sig: Take 1 tablet by mouth Every Night.     Dispense:  30 tablet     Refill:  0       No follow-ups on file.           This document has been electronically signed by ALMA Garcia  October 16, 2024 14:38 EDT      Part of this note may be an electronic transcription/translation of spoken language to printed text using the Dragon Dictation System.

## 2024-11-14 DIAGNOSIS — F41.1 GENERALIZED ANXIETY DISORDER: ICD-10-CM

## 2024-11-14 RX ORDER — FLUOXETINE 40 MG/1
40 CAPSULE ORAL DAILY
Qty: 30 CAPSULE | Refills: 0 | Status: SHIPPED | OUTPATIENT
Start: 2024-11-14 | End: 2025-11-14

## 2024-11-19 ENCOUNTER — TELEMEDICINE (OUTPATIENT)
Dept: PSYCHIATRY | Facility: CLINIC | Age: 34
End: 2024-11-19
Payer: COMMERCIAL

## 2024-11-19 VITALS
WEIGHT: 170 LBS | DIASTOLIC BLOOD PRESSURE: 71 MMHG | HEART RATE: 94 BPM | BODY MASS INDEX: 28.32 KG/M2 | HEIGHT: 65 IN | SYSTOLIC BLOOD PRESSURE: 124 MMHG

## 2024-11-19 DIAGNOSIS — F90.0 ATTENTION DEFICIT HYPERACTIVITY DISORDER (ADHD), PREDOMINANTLY INATTENTIVE TYPE: ICD-10-CM

## 2024-11-19 DIAGNOSIS — F51.01 PRIMARY INSOMNIA: ICD-10-CM

## 2024-11-19 DIAGNOSIS — F11.20 OPIOID TYPE DEPENDENCE, CONTINUOUS: Primary | ICD-10-CM

## 2024-11-19 DIAGNOSIS — R41.840 IMPAIRED CONCENTRATION: ICD-10-CM

## 2024-11-19 DIAGNOSIS — Z79.899 MEDICATION MANAGEMENT: ICD-10-CM

## 2024-11-19 DIAGNOSIS — F41.1 GENERALIZED ANXIETY DISORDER: ICD-10-CM

## 2024-11-19 LAB
AMPHET+METHAMPHET UR QL: POSITIVE
AMPHETAMINES UR QL: NEGATIVE
BARBITURATES UR QL SCN: NEGATIVE
BENZODIAZ UR QL SCN: NEGATIVE
BUPRENORPHINE SERPL-MCNC: POSITIVE NG/ML
CANNABINOIDS SERPL QL: NEGATIVE
COCAINE UR QL: NEGATIVE
ETHANOL URINE SCREEN: NEGATIVE
FENTANYL UR-MCNC: NEGATIVE NG/ML
GABAPENTIN SERPLBLD-MCNC: NEGATIVE UG/ML
MDMA UR QL SCN: NEGATIVE
METHADONE UR QL SCN: NEGATIVE
MORPHINE/OPIATES SCREEN, URINE: NEGATIVE
OXYCODONE UR QL SCN: NEGATIVE
PCP UR QL SCN: NEGATIVE
PROPOXYPH UR QL SCN: NEGATIVE
TRICYCLICS UR QL SCN: NEGATIVE

## 2024-11-19 RX ORDER — ATOMOXETINE 60 MG/1
60 CAPSULE ORAL DAILY
Qty: 30 CAPSULE | Refills: 0 | Status: SHIPPED | OUTPATIENT
Start: 2024-11-19 | End: 2025-11-19

## 2024-11-19 RX ORDER — FLUOXETINE 40 MG/1
40 CAPSULE ORAL DAILY
Qty: 30 CAPSULE | Refills: 0 | Status: SHIPPED | OUTPATIENT
Start: 2024-11-19 | End: 2025-11-19

## 2024-11-19 RX ORDER — BUPRENORPHINE HYDROCHLORIDE AND NALOXONE HYDROCHLORIDE DIHYDRATE 8; 2 MG/1; MG/1
2 TABLET SUBLINGUAL DAILY
Qty: 56 TABLET | Refills: 0 | Status: SHIPPED | OUTPATIENT
Start: 2024-11-19

## 2024-11-19 RX ORDER — GABAPENTIN 600 MG/1
600 TABLET ORAL 4 TIMES DAILY
Qty: 120 TABLET | Refills: 0 | Status: SHIPPED | OUTPATIENT
Start: 2024-11-19

## 2024-11-19 RX ORDER — QUETIAPINE FUMARATE 100 MG/1
100 TABLET, FILM COATED ORAL NIGHTLY
Qty: 30 TABLET | Refills: 0 | Status: SHIPPED | OUTPATIENT
Start: 2024-11-19

## 2024-11-19 NOTE — PROGRESS NOTES
This provider is located at [facility]. The Patient is seen remotely located at the [facility] ([facility]) using Video. Patient is being seen via telehealth and confirm that they are in a secure environment for this session. The patient's condition being diagnosed/treated is appropriate for telemedicine. Provider identified as Xu Saeed as well as credentials APRN MSN FNP-C PUMA-JULISSA.   The client/patient gave consent to be seen remotely, and when consent is given they understand that the consent allows for patient identifiable information to be sent to a third party as needed.   They may refuse to be seen remotely at any time. The electronic data is encrypted and password protected, and the patient has been advised of the potential risks to privacy not withstanding such measures.    Chief Complaint/History of Present Illness: Follow Up buprenorphine/naloxone Medicated Assisted Treatment for Opiate Use Disorder     Patient/Client Concerns/Updates: Continue Prozac and clonidine for anxiety, Strattera for difficulty concentrating, gabapentin for chronic bilateral leg pain and Seroquel for treatment resistant anxiety and insomnia   -Patient reports no significant or concerning life events or changes since last evaluation; reports no acute life stressors patient reports she is doing well reporting stability  -Reports continued therapeutic benefit and satisfaction with current care plan; reports no concerning side effects with current medication prescribed  -Mood is stable; patient denies acute anxious episodes, exacerbations thereof or concerning panic attacks  -Reports no acute depressive episodes or concerns, no suicidal or homicidal thoughts  -Reports no perceptual disturbances or otherwise symptoms of psychosis  -Patient reports she self increased Seroquel to 100 mg and at this dosage sleep quality has been adequate will represcribe at this dosage and encourage patient in general to discuss  medication changes or adjustments with respective provider    Triggers (Persons/Places/Things/Events/Thought/Emotions): Generalized anxiety difficulty concentrating chronic leg pain and insomnia    Cravings/Substance Use: Denies cravings or use of illicit substances    Relapse Prevention: Counseling    Urine Drug Screen (today's visit)/Presumptive Point of Care discussed: Positive buprenorphine positive amphetamine, methamphetamine negative    UDS Confirmation (Most recent/Resulted): Positive buprenorphine/nor-buprenorphine, no concerns for urine tampering otherwise positive gabapentin and amphetamine    Most recent pertinent laboratory studies reviewed: 10/24/2022-hepatic function within normal limits, Cr WNL     DONG (PDMP) Reviewed for Current/Active Medications: buprenorphine/naloxone and gabapentin as reviewed today    Past Surgical History:  Past Surgical History:   Procedure Laterality Date   • APPENDECTOMY     •  SECTION     • ENDOSCOPY N/A 10/28/2022    Procedure: ESOPHAGOGASTRODUODENOSCOPY;  Surgeon: Jayro Bar MD;  Location: [facility];  Service: Gastroenterology;  Laterality: N/A;       Problem List:  Patient Active Problem List   Diagnosis   • Esophageal dysphagia   • Iron deficiency anemia, unspecified   • Malabsorption of iron       Allergy:   Allergies   Allergen Reactions   • Atomoxetine Other (See Comments)     Skin crawling-per patient   • Diphenhydramine    • Sulfamethoxazole-Trimethoprim         Current Medications:   Current Outpatient Medications   Medication Sig Dispense Refill   • albuterol sulfate  (90 Base) MCG/ACT inhaler      • atomoxetine (STRATTERA) 60 MG capsule Take 1 capsule by mouth Daily. 30 capsule 0   • buprenorphine-naloxone (SUBOXONE) 8-2 MG per SL tablet Place 2 tablets under the tongue Daily. 56 tablet 0   • cloNIDine (CATAPRES) 0.1 MG tablet Take 1 tablet by mouth 2 (Two) Times a Day. 180 tablet 0   • Diclofenac Sodium (VOLTAREN) 1 % gel gel APPLY  TOPICALLY TO THE AFFECTED AREA ON THE HANDS 3 TIMES DAILY     • estradiol cypionate (DEPO-ESTRADIOL) 5 MG/ML injection Inject  into the appropriate muscle as directed by prescriber Every 28 (Twenty-Eight) Days.     • FLUoxetine (PROzac) 40 MG capsule Take 1 capsule by mouth Daily. 30 capsule 0   • gabapentin (NEURONTIN) 600 MG tablet Take 1 tablet by mouth 4 (Four) Times a Day. 120 tablet 0   • hydrOXYzine pamoate (VISTARIL) 25 MG capsule Take 1 capsule by mouth Every 12 (Twelve) Hours. 60 capsule 0   • ibuprofen (ADVIL,MOTRIN) 200 MG tablet Take 4 tablets by mouth Every 12 (Twelve) Hours.     • medroxyPROGESTERone (DEPO-PROVERA) 150 MG/ML injection Inject 1 mL into the appropriate muscle as directed by prescriber Every 3 (Three) Months.     • metroNIDAZOLE (FLAGYL) 500 MG tablet      • Neurontin 600 MG tablet Take 1 tablet by mouth 4 (Four) Times a Day. 120 tablet 0   • pantoprazole (PROTONIX) 40 MG EC tablet Take 1 tablet by mouth Daily. 30 tablet 0   • QUEtiapine (SEROquel) 100 MG tablet Take 1 tablet by mouth Every Night. 30 tablet 0   • naloxone (NARCAN) 4 MG/0.1ML nasal spray 1 spray into the nostril(s) as directed by provider As Needed (opiate over sedation). 1 spray in 1 nostril every 2 to 3 minutes call 911 (Patient not taking: Reported on 11/19/2024) 2 each 2     No current facility-administered medications for this visit.       Past Medical History:  Past Medical History:   Diagnosis Date   • Acid reflux    • Anemia    • Anxiety    • Substance abuse          Social History     Socioeconomic History   • Marital status: Single   Tobacco Use   • Smoking status: Every Day     Current packs/day: 1.00     Average packs/day: 1 pack/day for 7.0 years (7.0 ttl pk-yrs)     Types: Cigarettes     Passive exposure: Current   • Smokeless tobacco: Never   Vaping Use   • Vaping status: Never Used   Substance and Sexual Activity   • Alcohol use: Not Currently   • Drug use: Not Currently     Types: Methamphetamines      Comment: per patient has been clean for about 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   • Sexual activity: Defer     Birth control/protection: Depo-provera         Family History   Problem Relation Age of Onset   • Heart disease Mother    • Heart disease Father    • Cancer Maternal Grandmother    • Cancer Maternal Grandfather          Mental Status Exam:   Hygiene:   good  Cooperation:  Cooperative  Eye Contact:  Good  Psychomotor Behavior:  Appropriate  Affect:  Appropriate  Mood: anxious  Speech:  Normal  Thought Process:  Goal directed  Thought Content:  Normal  Suicidal:  None  Homicidal:  None  Hallucinations:  None  Delusion:  None  Memory:  Intact  Orientation:  Grossly intact  Reliability:  good  Insight:  Good  Judgement:  Good  Impulse Control:  Good         Review of Systems:  Review of Systems   Constitutional:  Negative for activity change, chills, diaphoresis and fatigue.   Respiratory:  Negative for apnea, cough and shortness of breath.    Cardiovascular:  Negative for chest pain, palpitations and leg swelling.   Gastrointestinal:  Negative for abdominal pain, constipation, diarrhea, nausea and vomiting.   Genitourinary:  Negative for difficulty urinating.   Musculoskeletal:  Positive for arthralgias.   Skin:  Negative for rash.   Neurological:  Negative for dizziness, weakness and headaches.   Psychiatric/Behavioral:  Positive for sleep disturbance. Negative for agitation, self-injury and suicidal ideas. The patient is nervous/anxious.        Physical Exam:  Physical Exam  Vitals reviewed.   Constitutional:       General: She is not in acute distress.     Appearance: Normal appearance. She is not ill-appearing or toxic-appearing.   Pulmonary:      Effort: Pulmonary effort is normal.   Musculoskeletal:         General: Normal range of motion.   Neurological:      General: No focal deficit present.      Mental Status: She is alert and oriented to person, place, and time.   Psychiatric:         Attention and Perception:  "Attention and perception normal.         Mood and Affect: Mood normal. Mood is anxious. Mood is not depressed.         Speech: Speech normal.         Behavior: Behavior normal. Behavior is cooperative.         Thought Content: Thought content normal.         Cognition and Memory: Cognition and memory normal.         Judgment: Judgment normal.     Vital Signs:   /71   Pulse 94   Ht 165.1 cm (65\")   Wt 77.1 kg (170 lb)   BMI 28.29 kg/m²      Lab Results:   Telemedicine on 11/19/2024   Component Date Value Ref Range Status   • Amphetamine Screen, Urine 11/19/2024 Positive (A)  Negative Final    PRELIMINARY RESULTS. PLEASE REFER TO [facility] LAB CONFIRMATION FOR FINAL RESULT   • Barbiturates Screen, Urine 11/19/2024 Negative  Negative Final   • Buprenorphine, Screen, Urine 11/19/2024 Positive (A)  Negative Final   • Benzodiazepine Screen, Urine 11/19/2024 Negative  Negative Final   • Cocaine Screen, Urine 11/19/2024 Negative  Negative Final   • MDMA (ECSTASY) 11/19/2024 Negative  Negative Final   • Methamphetamine, Ur 11/19/2024 Negative  Negative Final   • Methadone Screen, Urine 11/19/2024 Negative  Negative Final   • Morphine/Opiates Screen, Urine 11/19/2024 Negative  Negative Final   • Oxycodone Screen, Urine 11/19/2024 Negative  Negative Final   • Phencyclidine (PCP), Urine 11/19/2024 Negative  Negative Final   • Propoxyphene Scree, Urine 11/19/2024 Negative  Negative Final   • Tricyclic Antidepressants Screen 11/19/2024 Negative  Negative Final   • THC, Screen, Urine 11/19/2024 Negative  Negative Final   • Fentanyl, Urine 11/19/2024 Negative  Negative Final   • ETHANOL URINE SCREEN 11/19/2024 Negative   Final   • Gabapentin 11/19/2024 NEGATIVE   Final   Telemedicine on 10/16/2024   Component Date Value Ref Range Status   • Amphetamine Screen, Urine 10/16/2024 Negative  Negative Final    PRELIMINARY RESULTS. PLEASE REFER TO [facility] LAB CONFIRMATION FOR FINAL RESULTS   • Barbiturates Screen, Urine 10/16/2024 " Negative  Negative Final   • Buprenorphine, Screen, Urine 10/16/2024 Positive (A)  Negative Final   • Benzodiazepine Screen, Urine 10/16/2024 Negative  Negative Final   • Cocaine Screen, Urine 10/16/2024 Negative  Negative Final   • MDMA (ECSTASY) 10/16/2024 Negative  Negative Final   • Methamphetamine, Ur 10/16/2024 Negative  Negative Final   • Methadone Screen, Urine 10/16/2024 Negative  Negative Final   • Morphine/Opiates Screen, Urine 10/16/2024 Negative  Negative Final   • Oxycodone Screen, Urine 10/16/2024 Negative  Negative Final   • Phencyclidine (PCP), Urine 10/16/2024 Negative  Negative Final   • Propoxyphene Scree, Urine 10/16/2024 Negative  Negative Final   • Tricyclic Antidepressants Screen 10/16/2024 Negative  Negative Final   • THC, Screen, Urine 10/16/2024 Negative  Negative Final   • ETHANOL URINE SCREEN 10/16/2024 Negative   Final   • Fentanyl 10/16/2024 NEGATIVE   Final   • Gabapentin, Ur 10/16/2024 NEGATIVE   Final   Telemedicine on 09/09/2024   Component Date Value Ref Range Status   • External Amphetamine Screen Urine 09/09/2024 Negative   Final   • External Benzodiazepine Screen Uri* 09/09/2024 Negative   Final   • External Cocaine Screen Urine 09/09/2024 Negative   Final   • External THC Screen Urine 09/09/2024 Negative   Final   • External Methadone Screen Urine 09/09/2024 Negative   Final   • External Methamphetamine Screen Ur* 09/09/2024 Negative   Final   • External Oxycodone Screen Urine 09/09/2024 Negative   Final   • External Buprenorphine Screen Urine 09/09/2024 Positive (A)   Final   • External MDMA 09/09/2024 Negative   Final   • External Opiates Screen Urine 09/09/2024 Negative   Final   Telemedicine on 08/05/2024   Component Date Value Ref Range Status   • External Amphetamine Screen Urine 08/05/2024 Positive (A)   Final   • External Benzodiazepine Screen Uri* 08/05/2024 Negative   Final   • External Cocaine Screen Urine 08/05/2024 Negative   Final   • External THC Screen Urine  08/05/2024 Negative   Final   • External Methadone Screen Urine 08/05/2024 Negative   Final   • External Methamphetamine Screen Ur* 08/05/2024 Positive (A)   Final   • External Oxycodone Screen Urine 08/05/2024 Negative   Final   • External Buprenorphine Screen Urine 08/05/2024 Positive (A)   Final   • External MDMA 08/05/2024 Negative   Final   • External Opiates Screen Urine 08/05/2024 Negative   Final   Office Visit on 07/08/2024   Component Date Value Ref Range Status   • External Amphetamine Screen Urine 07/08/2024 Negative   Final   • External Benzodiazepine Screen Uri* 07/08/2024 Negative   Final   • External Cocaine Screen Urine 07/08/2024 Negative   Final   • External THC Screen Urine 07/08/2024 Negative   Final   • External Methadone Screen Urine 07/08/2024 Negative   Final   • External Methamphetamine Screen Ur* 07/08/2024 Negative   Final   • External Oxycodone Screen Urine 07/08/2024 Negative   Final   • External Buprenorphine Screen Urine 07/08/2024 Positive (A)   Final   • External MDMA 07/08/2024 Negative   Final   • External Opiates Screen Urine 07/08/2024 Negative   Final   Telemedicine on 06/06/2024   Component Date Value Ref Range Status   • External Amphetamine Screen Urine 06/06/2024 Negative   Final   • External Benzodiazepine Screen Uri* 06/06/2024 Positive (A)   Final   • External Cocaine Screen Urine 06/06/2024 Negative   Final   • External THC Screen Urine 06/06/2024 Negative   Final   • External Methadone Screen Urine 06/06/2024 Negative   Final   • External Methamphetamine Screen Ur* 06/06/2024 Negative   Final   • External Oxycodone Screen Urine 06/06/2024 Negative   Final   • External Buprenorphine Screen Urine 06/06/2024 Positive (A)   Final   • External MDMA 06/06/2024 Negative   Final   • External Opiates Screen Urine 06/06/2024 Negative   Final         Assessment & Plan   Diagnoses and all orders for this visit:    1. Opioid type dependence, continuous (Primary)  -      buprenorphine-naloxone (SUBOXONE) 8-2 MG per SL tablet; Place 2 tablets under the tongue Daily.  Dispense: 56 tablet; Refill: 0    2. Medication management  -     POC Medline 14 Panel Urine Drug Screen  -     gabapentin (NEURONTIN) 600 MG tablet; Take 1 tablet by mouth 4 (Four) Times a Day.  Dispense: 120 tablet; Refill: 0    3. Attention deficit hyperactivity disorder (ADHD), predominantly inattentive type  -     atomoxetine (STRATTERA) 60 MG capsule; Take 1 capsule by mouth Daily.  Dispense: 30 capsule; Refill: 0    4. Impaired concentration  -     atomoxetine (STRATTERA) 60 MG capsule; Take 1 capsule by mouth Daily.  Dispense: 30 capsule; Refill: 0    5. Generalized anxiety disorder  -     FLUoxetine (PROzac) 40 MG capsule; Take 1 capsule by mouth Daily.  Dispense: 30 capsule; Refill: 0  -     QUEtiapine (SEROquel) 100 MG tablet; Take 1 tablet by mouth Every Night.  Dispense: 30 tablet; Refill: 0    6. Primary insomnia  -     QUEtiapine (SEROquel) 100 MG tablet; Take 1 tablet by mouth Every Night.  Dispense: 30 tablet; Refill: 0        Visit Diagnoses:    ICD-10-CM ICD-9-CM   1. Opioid type dependence, continuous  F11.20 304.01   2. Medication management  Z79.899 V58.69   3. Attention deficit hyperactivity disorder (ADHD), predominantly inattentive type  F90.0 314.00   4. Impaired concentration  R41.840 799.51   5. Generalized anxiety disorder  F41.1 300.02   6. Primary insomnia  F51.01 307.42       PLAN:  Safety: No acute safety concerns  Risk Assessment: Risk of self-harm acutely is low. Risk of self-harm chronically is also low, but could be further elevated in the event of treatment noncompliance and/or AODA.    TREATMENT PLAN/GOALS: Continue supportive psychotherapy efforts and medications as indicated. Treatment and medication options discussed during today's visit. Patient acknowledged and verbally consented to continue with current treatment plan and was educated on the importance of compliance with  treatment and follow-up appointments.    MEDICATION ISSUES:  DONG reviewed as expected.  Discussed medication options and treatment plan of prescribed medication as well as the risks, benefits, and side effects including potential falls, possible impaired driving and metabolic adversities among others. Patient is agreeable to call the office with any worsening of symptoms or onset of side effects. Patient is agreeable to call 911 or go to the nearest ER should he/she begin having SI/HI. No medication side effects or related complaints today.     MEDS ORDERED DURING VISIT:  New Medications Ordered This Visit   Medications   • buprenorphine-naloxone (SUBOXONE) 8-2 MG per SL tablet     Sig: Place 2 tablets under the tongue Daily.     Dispense:  56 tablet     Refill:  0     NADEAN:[id number]   • atomoxetine (STRATTERA) 60 MG capsule     Sig: Take 1 capsule by mouth Daily.     Dispense:  30 capsule     Refill:  0   • FLUoxetine (PROzac) 40 MG capsule     Sig: Take 1 capsule by mouth Daily.     Dispense:  30 capsule     Refill:  0   • gabapentin (NEURONTIN) 600 MG tablet     Sig: Take 1 tablet by mouth 4 (Four) Times a Day.     Dispense:  120 tablet     Refill:  0   • QUEtiapine (SEROquel) 100 MG tablet     Sig: Take 1 tablet by mouth Every Night.     Dispense:  30 tablet     Refill:  0       No follow-ups on file.           This document has been electronically signed by ALMA Garcia  November 19, 2024 13:19 EST      Part of this note may be an electronic transcription/translation of spoken language to printed text using the Dragon Dictation System.

## 2024-12-19 ENCOUNTER — TELEMEDICINE (OUTPATIENT)
Dept: PSYCHIATRY | Facility: CLINIC | Age: 34
End: 2024-12-19
Payer: COMMERCIAL

## 2024-12-19 VITALS
SYSTOLIC BLOOD PRESSURE: 100 MMHG | BODY MASS INDEX: 29.66 KG/M2 | HEIGHT: 65 IN | WEIGHT: 178 LBS | DIASTOLIC BLOOD PRESSURE: 49 MMHG | HEART RATE: 76 BPM

## 2024-12-19 DIAGNOSIS — Z79.899 MEDICATION MANAGEMENT: ICD-10-CM

## 2024-12-19 DIAGNOSIS — F51.01 PRIMARY INSOMNIA: ICD-10-CM

## 2024-12-19 DIAGNOSIS — F90.0 ATTENTION DEFICIT HYPERACTIVITY DISORDER (ADHD), PREDOMINANTLY INATTENTIVE TYPE: ICD-10-CM

## 2024-12-19 DIAGNOSIS — R41.840 IMPAIRED CONCENTRATION: ICD-10-CM

## 2024-12-19 DIAGNOSIS — F11.20 OPIOID TYPE DEPENDENCE, CONTINUOUS: Primary | ICD-10-CM

## 2024-12-19 DIAGNOSIS — F41.1 GENERALIZED ANXIETY DISORDER: ICD-10-CM

## 2024-12-19 LAB
AMPHET+METHAMPHET UR QL: NEGATIVE
AMPHETAMINES UR QL: NEGATIVE
BARBITURATES UR QL SCN: NEGATIVE
BENZODIAZ UR QL SCN: NEGATIVE
BUPRENORPHINE SERPL-MCNC: POSITIVE NG/ML
CANNABINOIDS SERPL QL: NEGATIVE
COCAINE UR QL: NEGATIVE
ETHANOL URINE SCREEN: NEGATIVE
FENTANYL UR-MCNC: NEGATIVE NG/ML
GABAPENTIN SERPLBLD-MCNC: NEGATIVE UG/ML
MDMA UR QL SCN: NEGATIVE
METHADONE UR QL SCN: NEGATIVE
MORPHINE/OPIATES SCREEN, URINE: NEGATIVE
OXYCODONE UR QL SCN: NEGATIVE
PCP UR QL SCN: NEGATIVE
PROPOXYPH UR QL SCN: NEGATIVE
TRICYCLICS UR QL SCN: NEGATIVE

## 2024-12-19 RX ORDER — HYDROXYZINE PAMOATE 25 MG/1
25 CAPSULE ORAL EVERY 12 HOURS SCHEDULED
Qty: 60 CAPSULE | Refills: 0 | Status: SHIPPED | OUTPATIENT
Start: 2024-12-19

## 2024-12-19 RX ORDER — ATOMOXETINE 60 MG/1
60 CAPSULE ORAL DAILY
Qty: 30 CAPSULE | Refills: 0 | Status: SHIPPED | OUTPATIENT
Start: 2024-12-19 | End: 2025-12-19

## 2024-12-19 RX ORDER — BUPRENORPHINE HYDROCHLORIDE AND NALOXONE HYDROCHLORIDE DIHYDRATE 8; 2 MG/1; MG/1
2 TABLET SUBLINGUAL DAILY
Qty: 56 TABLET | Refills: 0 | Status: SHIPPED | OUTPATIENT
Start: 2024-12-19

## 2024-12-19 RX ORDER — FLUOXETINE 40 MG/1
40 CAPSULE ORAL DAILY
Qty: 30 CAPSULE | Refills: 0 | Status: SHIPPED | OUTPATIENT
Start: 2024-12-19 | End: 2025-12-19

## 2024-12-19 RX ORDER — QUETIAPINE FUMARATE 50 MG/1
100 TABLET, FILM COATED ORAL NIGHTLY
Qty: 60 TABLET | Refills: 0 | Status: SHIPPED | OUTPATIENT
Start: 2024-12-19

## 2024-12-19 RX ORDER — GABAPENTIN 600 MG/1
600 TABLET ORAL 4 TIMES DAILY
Qty: 120 TABLET | Refills: 0 | Status: SHIPPED | OUTPATIENT
Start: 2024-12-19

## 2024-12-19 NOTE — PROGRESS NOTES
This provider is located at [facility]. The Patient is seen remotely located at the [facility] ([facility]) using Video. Patient is being seen via telehealth and confirm that they are in a secure environment for this session. The patient's condition being diagnosed/treated is appropriate for telemedicine. Provider identified as Xu Saeed as well as credentials APRN MSN FNP-C PUMA-JULISSA.   The client/patient gave consent to be seen remotely, and when consent is given they understand that the consent allows for patient identifiable information to be sent to a third party as needed.   They may refuse to be seen remotely at any time. The electronic data is encrypted and password protected, and the patient has been advised of the potential risks to privacy not withstanding such measures.    Chief Complaint/History of Present Illness: Follow Up buprenorphine/naloxone Medicated Assisted Treatment for Opiate Use Disorder     Patient/Client Concerns/Updates: Continue Prozac and clonidine for anxiety, Strattera for difficulty concentrating, gabapentin for chronic bilateral leg pain and Seroquel for treatment resistant anxiety and insomnia   -Discussed positive methamphetamine last month definitive urinalysis; patient reports this was unexpected and unintentional; discussed possible routes of secondhand exposure most notably via inhalation of someone is smoking methamphetamine; patient expresses high likelihood someone may have been smoking the substance in her presence and advised to be vigilant going forward to reduce the risk of inadvertent exposure  -Patient reports no significant or concerning life events or changes since last evaluation; reports no acute life stressors  -Reports continued therapeutic benefit and satisfaction with current care plan; reports no concerning side effects with current medication prescribed  -Mood is stable; patient denies acute anxious episodes, exacerbations thereof or  concerning panic attacks  -Reports no acute depressive episodes or concerns, no suicidal or homicidal thoughts  -Reports no perceptual disturbances or otherwise symptoms of psychosis  -Patient reports no concerns with sleep quality or disturbance thereof    Triggers (Persons/Places/Things/Events/Thought/Emotions): Generalized anxiety difficulty concentrating chronic leg pain and insomnia    Cravings/Substance Use: Denies cravings or personal use of illicit or nonprescribed substances    Relapse Prevention: Counseling    Urine Drug Screen (today's visit)/Presumptive Point of Care discussed: Positive buprenorphine, otherwise negative for substances tested    UDS Confirmation (Most recent/Resulted): Positive buprenorphine/nor-buprenorphine, no concerns for urine tampering otherwise positive gabapentin and methamphetamine    Most recent pertinent laboratory studies reviewed: 10/24/2022-hepatic function within normal limits, Cr RITA UMANA (PDMP) Reviewed for Current/Active Medications: buprenorphine/naloxone and gabapentin as reviewed today    Past Surgical History:  Past Surgical History:   Procedure Laterality Date   • APPENDECTOMY     •  SECTION     • ENDOSCOPY N/A 10/28/2022    Procedure: ESOPHAGOGASTRODUODENOSCOPY;  Surgeon: Jayro Bar MD;  Location: [facility];  Service: Gastroenterology;  Laterality: N/A;       Problem List:  Patient Active Problem List   Diagnosis   • Esophageal dysphagia   • Iron deficiency anemia, unspecified   • Malabsorption of iron       Allergy:   Allergies   Allergen Reactions   • Atomoxetine Other (See Comments)     Skin crawling-per patient   • Diphenhydramine    • Sulfamethoxazole-Trimethoprim         Current Medications:   Current Outpatient Medications   Medication Sig Dispense Refill   • albuterol sulfate  (90 Base) MCG/ACT inhaler      • atomoxetine (STRATTERA) 60 MG capsule Take 1 capsule by mouth Daily. 30 capsule 0   • buprenorphine-naloxone (SUBOXONE) 8-2  MG per SL tablet Place 2 tablets under the tongue Daily. 56 tablet 0   • cloNIDine (CATAPRES) 0.1 MG tablet Take 1 tablet by mouth 2 (Two) Times a Day. 180 tablet 0   • FLUoxetine (PROzac) 40 MG capsule Take 1 capsule by mouth Daily. 30 capsule 0   • gabapentin (NEURONTIN) 600 MG tablet Take 1 tablet by mouth 4 (Four) Times a Day. 120 tablet 0   • hydrOXYzine pamoate (VISTARIL) 25 MG capsule Take 1 capsule by mouth Every 12 (Twelve) Hours. 60 capsule 0   • ibuprofen (ADVIL,MOTRIN) 200 MG tablet Take 4 tablets by mouth Every 12 (Twelve) Hours.     • medroxyPROGESTERone (DEPO-PROVERA) 150 MG/ML injection Inject 1 mL into the appropriate muscle as directed by prescriber Every 3 (Three) Months.     • Neurontin 600 MG tablet Take 1 tablet by mouth 4 (Four) Times a Day. 120 tablet 0   • QUEtiapine (SEROquel) 50 MG tablet Take 2 tablets by mouth Every Night. 60 tablet 0   • Diclofenac Sodium (VOLTAREN) 1 % gel gel APPLY TOPICALLY TO THE AFFECTED AREA ON THE HANDS 3 TIMES DAILY (Patient not taking: Reported on 12/19/2024)     • estradiol cypionate (DEPO-ESTRADIOL) 5 MG/ML injection Inject  into the appropriate muscle as directed by prescriber Every 28 (Twenty-Eight) Days. (Patient not taking: Reported on 12/19/2024)     • metroNIDAZOLE (FLAGYL) 500 MG tablet  (Patient not taking: Reported on 12/19/2024)     • naloxone (NARCAN) 4 MG/0.1ML nasal spray 1 spray into the nostril(s) as directed by provider As Needed (opiate over sedation). 1 spray in 1 nostril every 2 to 3 minutes call 911 (Patient not taking: Reported on 12/19/2024) 2 each 2   • pantoprazole (PROTONIX) 40 MG EC tablet Take 1 tablet by mouth Daily. (Patient not taking: Reported on 12/19/2024) 30 tablet 0     No current facility-administered medications for this visit.       Past Medical History:  Past Medical History:   Diagnosis Date   • Acid reflux    • Anemia    • Anxiety    • Substance abuse          Social History     Socioeconomic History   • Marital  status: Single   Tobacco Use   • Smoking status: Every Day     Current packs/day: 1.00     Average packs/day: 1 pack/day for 7.0 years (7.0 ttl pk-yrs)     Types: Cigarettes     Passive exposure: Current   • Smokeless tobacco: Never   Vaping Use   • Vaping status: Never Used   Substance and Sexual Activity   • Alcohol use: Not Currently   • Drug use: Not Currently     Types: Methamphetamines     Comment: per patient has been clean for about 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   • Sexual activity: Defer     Birth control/protection: Depo-provera         Family History   Problem Relation Age of Onset   • Heart disease Mother    • Heart disease Father    • Cancer Maternal Grandmother    • Cancer Maternal Grandfather          Mental Status Exam:   Hygiene:   good  Cooperation:  Cooperative  Eye Contact:  Good  Psychomotor Behavior:  Appropriate  Affect:  Appropriate  Mood: anxious  Speech:  Normal  Thought Process:  Goal directed  Thought Content:  Normal  Suicidal:  None  Homicidal:  None  Hallucinations:  None  Delusion:  None  Memory:  Intact  Orientation:  Grossly intact  Reliability:  good  Insight:  Good  Judgement:  Good  Impulse Control:  Good         Review of Systems:  Review of Systems   Constitutional:  Negative for activity change, chills, diaphoresis and fatigue.   Respiratory:  Negative for apnea, cough and shortness of breath.    Cardiovascular:  Negative for chest pain, palpitations and leg swelling.   Gastrointestinal:  Negative for abdominal pain, constipation, diarrhea, nausea and vomiting.   Genitourinary:  Negative for difficulty urinating.   Musculoskeletal:  Positive for arthralgias.   Skin:  Negative for rash.   Neurological:  Negative for dizziness, weakness and headaches.   Psychiatric/Behavioral:  Positive for decreased concentration and sleep disturbance. Negative for agitation, self-injury and suicidal ideas. The patient is nervous/anxious.        Physical Exam:  Physical Exam  Vitals reviewed.  "  Constitutional:       General: She is not in acute distress.     Appearance: Normal appearance. She is not ill-appearing or toxic-appearing.   Pulmonary:      Effort: Pulmonary effort is normal.   Musculoskeletal:         General: Normal range of motion.   Neurological:      General: No focal deficit present.      Mental Status: She is alert and oriented to person, place, and time.   Psychiatric:         Attention and Perception: Attention and perception normal.         Mood and Affect: Mood normal. Mood is anxious. Mood is not depressed.         Speech: Speech normal.         Behavior: Behavior normal. Behavior is cooperative.         Thought Content: Thought content normal.         Cognition and Memory: Cognition and memory normal.         Judgment: Judgment normal.     Vital Signs:   /49   Pulse 76   Ht 165.1 cm (65\")   Wt 80.7 kg (178 lb)   BMI 29.62 kg/m²      Lab Results:   Telemedicine on 12/19/2024   Component Date Value Ref Range Status   • Amphetamine Screen, Urine 12/19/2024 Negative  Negative Final   • Barbiturates Screen, Urine 12/19/2024 Negative  Negative Final   • Buprenorphine, Screen, Urine 12/19/2024 Positive (A)  Negative Final   • Benzodiazepine Screen, Urine 12/19/2024 Negative  Negative Final   • Cocaine Screen, Urine 12/19/2024 Negative  Negative Final   • MDMA (ECSTASY) 12/19/2024 Negative  Negative Final   • Methamphetamine, Ur 12/19/2024 Negative  Negative Final   • Methadone Screen, Urine 12/19/2024 Negative  Negative Final   • Morphine/Opiates Screen, Urine 12/19/2024 Negative  Negative Final   • Oxycodone Screen, Urine 12/19/2024 Negative  Negative Final   • Phencyclidine (PCP), Urine 12/19/2024 Negative  Negative Final   • Propoxyphene Scree, Urine 12/19/2024 Negative  Negative Final   • Tricyclic Antidepressants Screen 12/19/2024 Negative  Negative Final   • THC, Screen, Urine 12/19/2024 Negative  Negative Final   • Gabapentin 12/19/2024 negative   Final   • ETHANOL URINE " SCREEN 12/19/2024 Negative   Final   • Fentanyl, Urine 12/19/2024 Negative  Negative Final   Telemedicine on 11/19/2024   Component Date Value Ref Range Status   • Amphetamine Screen, Urine 11/19/2024 Positive (A)  Negative Final    PRELIMINARY RESULTS. PLEASE REFER TO [facility] LAB CONFIRMATION FOR FINAL RESULT   • Barbiturates Screen, Urine 11/19/2024 Negative  Negative Final   • Buprenorphine, Screen, Urine 11/19/2024 Positive (A)  Negative Final   • Benzodiazepine Screen, Urine 11/19/2024 Negative  Negative Final   • Cocaine Screen, Urine 11/19/2024 Negative  Negative Final   • MDMA (ECSTASY) 11/19/2024 Negative  Negative Final   • Methamphetamine, Ur 11/19/2024 Negative  Negative Final   • Methadone Screen, Urine 11/19/2024 Negative  Negative Final   • Morphine/Opiates Screen, Urine 11/19/2024 Negative  Negative Final   • Oxycodone Screen, Urine 11/19/2024 Negative  Negative Final   • Phencyclidine (PCP), Urine 11/19/2024 Negative  Negative Final   • Propoxyphene Scree, Urine 11/19/2024 Negative  Negative Final   • Tricyclic Antidepressants Screen 11/19/2024 Negative  Negative Final   • THC, Screen, Urine 11/19/2024 Negative  Negative Final   • Fentanyl, Urine 11/19/2024 Negative  Negative Final   • ETHANOL URINE SCREEN 11/19/2024 Negative   Final   • Gabapentin 11/19/2024 NEGATIVE   Final   Telemedicine on 10/16/2024   Component Date Value Ref Range Status   • Amphetamine Screen, Urine 10/16/2024 Negative  Negative Final    PRELIMINARY RESULTS. PLEASE REFER TO [facility] LAB CONFIRMATION FOR FINAL RESULTS   • Barbiturates Screen, Urine 10/16/2024 Negative  Negative Final   • Buprenorphine, Screen, Urine 10/16/2024 Positive (A)  Negative Final   • Benzodiazepine Screen, Urine 10/16/2024 Negative  Negative Final   • Cocaine Screen, Urine 10/16/2024 Negative  Negative Final   • MDMA (ECSTASY) 10/16/2024 Negative  Negative Final   • Methamphetamine, Ur 10/16/2024 Negative  Negative Final   • Methadone Screen, Urine  10/16/2024 Negative  Negative Final   • Morphine/Opiates Screen, Urine 10/16/2024 Negative  Negative Final   • Oxycodone Screen, Urine 10/16/2024 Negative  Negative Final   • Phencyclidine (PCP), Urine 10/16/2024 Negative  Negative Final   • Propoxyphene Scree, Urine 10/16/2024 Negative  Negative Final   • Tricyclic Antidepressants Screen 10/16/2024 Negative  Negative Final   • THC, Screen, Urine 10/16/2024 Negative  Negative Final   • ETHANOL URINE SCREEN 10/16/2024 Negative   Final   • Fentanyl 10/16/2024 NEGATIVE   Final   • Gabapentin, Ur 10/16/2024 NEGATIVE   Final   Telemedicine on 09/09/2024   Component Date Value Ref Range Status   • External Amphetamine Screen Urine 09/09/2024 Negative   Final   • External Benzodiazepine Screen Uri* 09/09/2024 Negative   Final   • External Cocaine Screen Urine 09/09/2024 Negative   Final   • External THC Screen Urine 09/09/2024 Negative   Final   • External Methadone Screen Urine 09/09/2024 Negative   Final   • External Methamphetamine Screen Ur* 09/09/2024 Negative   Final   • External Oxycodone Screen Urine 09/09/2024 Negative   Final   • External Buprenorphine Screen Urine 09/09/2024 Positive (A)   Final   • External MDMA 09/09/2024 Negative   Final   • External Opiates Screen Urine 09/09/2024 Negative   Final   Telemedicine on 08/05/2024   Component Date Value Ref Range Status   • External Amphetamine Screen Urine 08/05/2024 Positive (A)   Final   • External Benzodiazepine Screen Uri* 08/05/2024 Negative   Final   • External Cocaine Screen Urine 08/05/2024 Negative   Final   • External THC Screen Urine 08/05/2024 Negative   Final   • External Methadone Screen Urine 08/05/2024 Negative   Final   • External Methamphetamine Screen Ur* 08/05/2024 Positive (A)   Final   • External Oxycodone Screen Urine 08/05/2024 Negative   Final   • External Buprenorphine Screen Urine 08/05/2024 Positive (A)   Final   • External MDMA 08/05/2024 Negative   Final   • External Opiates Screen  Urine 08/05/2024 Negative   Final   Office Visit on 07/08/2024   Component Date Value Ref Range Status   • External Amphetamine Screen Urine 07/08/2024 Negative   Final   • External Benzodiazepine Screen Uri* 07/08/2024 Negative   Final   • External Cocaine Screen Urine 07/08/2024 Negative   Final   • External THC Screen Urine 07/08/2024 Negative   Final   • External Methadone Screen Urine 07/08/2024 Negative   Final   • External Methamphetamine Screen Ur* 07/08/2024 Negative   Final   • External Oxycodone Screen Urine 07/08/2024 Negative   Final   • External Buprenorphine Screen Urine 07/08/2024 Positive (A)   Final   • External MDMA 07/08/2024 Negative   Final   • External Opiates Screen Urine 07/08/2024 Negative   Final         Assessment & Plan   Diagnoses and all orders for this visit:    1. Opioid type dependence, continuous (Primary)  -     buprenorphine-naloxone (SUBOXONE) 8-2 MG per SL tablet; Place 2 tablets under the tongue Daily.  Dispense: 56 tablet; Refill: 0    2. Medication management  -     POC Medline 14 Panel Urine Drug Screen  -     gabapentin (NEURONTIN) 600 MG tablet; Take 1 tablet by mouth 4 (Four) Times a Day.  Dispense: 120 tablet; Refill: 0    3. Attention deficit hyperactivity disorder (ADHD), predominantly inattentive type  -     atomoxetine (STRATTERA) 60 MG capsule; Take 1 capsule by mouth Daily.  Dispense: 30 capsule; Refill: 0    4. Impaired concentration  -     atomoxetine (STRATTERA) 60 MG capsule; Take 1 capsule by mouth Daily.  Dispense: 30 capsule; Refill: 0    5. Generalized anxiety disorder  -     FLUoxetine (PROzac) 40 MG capsule; Take 1 capsule by mouth Daily.  Dispense: 30 capsule; Refill: 0  -     hydrOXYzine pamoate (VISTARIL) 25 MG capsule; Take 1 capsule by mouth Every 12 (Twelve) Hours.  Dispense: 60 capsule; Refill: 0  -     QUEtiapine (SEROquel) 50 MG tablet; Take 2 tablets by mouth Every Night.  Dispense: 60 tablet; Refill: 0    6. Primary insomnia  -     QUEtiapine  (SEROquel) 50 MG tablet; Take 2 tablets by mouth Every Night.  Dispense: 60 tablet; Refill: 0        Visit Diagnoses:    ICD-10-CM ICD-9-CM   1. Opioid type dependence, continuous  F11.20 304.01   2. Medication management  Z79.899 V58.69   3. Attention deficit hyperactivity disorder (ADHD), predominantly inattentive type  F90.0 314.00   4. Impaired concentration  R41.840 799.51   5. Generalized anxiety disorder  F41.1 300.02   6. Primary insomnia  F51.01 307.42       PLAN:  Safety: No acute safety concerns  Risk Assessment: Risk of self-harm acutely is low. Risk of self-harm chronically is also low, but could be further elevated in the event of treatment noncompliance and/or AODA.    TREATMENT PLAN/GOALS: Continue supportive psychotherapy efforts and medications as indicated. Treatment and medication options discussed during today's visit. Patient acknowledged and verbally consented to continue with current treatment plan and was educated on the importance of compliance with treatment and follow-up appointments.    MEDICATION ISSUES:  DONG reviewed as expected.  Discussed medication options and treatment plan of prescribed medication as well as the risks, benefits, and side effects including potential falls, possible impaired driving and metabolic adversities among others. Patient is agreeable to call the office with any worsening of symptoms or onset of side effects. Patient is agreeable to call 911 or go to the nearest ER should he/she begin having SI/HI. No medication side effects or related complaints today.     MEDS ORDERED DURING VISIT:  New Medications Ordered This Visit   Medications   • buprenorphine-naloxone (SUBOXONE) 8-2 MG per SL tablet     Sig: Place 2 tablets under the tongue Daily.     Dispense:  56 tablet     Refill:  0     RAFAELN:[id number]   • atomoxetine (STRATTERA) 60 MG capsule     Sig: Take 1 capsule by mouth Daily.     Dispense:  30 capsule     Refill:  0   • FLUoxetine (PROzac) 40 MG  capsule     Sig: Take 1 capsule by mouth Daily.     Dispense:  30 capsule     Refill:  0   • gabapentin (NEURONTIN) 600 MG tablet     Sig: Take 1 tablet by mouth 4 (Four) Times a Day.     Dispense:  120 tablet     Refill:  0   • hydrOXYzine pamoate (VISTARIL) 25 MG capsule     Sig: Take 1 capsule by mouth Every 12 (Twelve) Hours.     Dispense:  60 capsule     Refill:  0   • QUEtiapine (SEROquel) 50 MG tablet     Sig: Take 2 tablets by mouth Every Night.     Dispense:  60 tablet     Refill:  0       No follow-ups on file.           This document has been electronically signed by ALMA Garcia  December 19, 2024 16:13 EST      Part of this note may be an electronic transcription/translation of spoken language to printed text using the Dragon Dictation System.

## 2025-01-18 ENCOUNTER — APPOINTMENT (OUTPATIENT)
Dept: CT IMAGING | Facility: HOSPITAL | Age: 35
End: 2025-01-18
Payer: COMMERCIAL

## 2025-01-18 ENCOUNTER — APPOINTMENT (OUTPATIENT)
Dept: GENERAL RADIOLOGY | Facility: HOSPITAL | Age: 35
End: 2025-01-18
Payer: COMMERCIAL

## 2025-01-18 ENCOUNTER — HOSPITAL ENCOUNTER (EMERGENCY)
Facility: HOSPITAL | Age: 35
Discharge: HOME | End: 2025-01-18
Attending: STUDENT IN AN ORGANIZED HEALTH CARE EDUCATION/TRAINING PROGRAM
Payer: COMMERCIAL

## 2025-01-18 VITALS
BODY MASS INDEX: 28.66 KG/M2 | RESPIRATION RATE: 20 BRPM | SYSTOLIC BLOOD PRESSURE: 143 MMHG | WEIGHT: 172 LBS | HEART RATE: 112 BPM | OXYGEN SATURATION: 94 % | TEMPERATURE: 97.7 F | DIASTOLIC BLOOD PRESSURE: 74 MMHG | HEIGHT: 65 IN

## 2025-01-18 DIAGNOSIS — S66.912A HAND STRAIN, LEFT, INITIAL ENCOUNTER: ICD-10-CM

## 2025-01-18 DIAGNOSIS — S09.90XA INJURY OF HEAD, INITIAL ENCOUNTER: Primary | ICD-10-CM

## 2025-01-18 DIAGNOSIS — S16.1XXA STRAIN OF NECK MUSCLE, INITIAL ENCOUNTER: ICD-10-CM

## 2025-01-18 PROCEDURE — 73130 X-RAY EXAM OF HAND: CPT | Performed by: RADIOLOGY

## 2025-01-18 PROCEDURE — 70450 CT HEAD/BRAIN W/O DYE: CPT

## 2025-01-18 PROCEDURE — 73130 X-RAY EXAM OF HAND: CPT

## 2025-01-18 PROCEDURE — 70450 CT HEAD/BRAIN W/O DYE: CPT | Performed by: RADIOLOGY

## 2025-01-18 PROCEDURE — 99284 EMERGENCY DEPT VISIT MOD MDM: CPT

## 2025-01-18 PROCEDURE — 72125 CT NECK SPINE W/O DYE: CPT | Performed by: RADIOLOGY

## 2025-01-18 PROCEDURE — 72125 CT NECK SPINE W/O DYE: CPT
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
## 2025-03-25 ENCOUNTER — TELEMEDICINE (OUTPATIENT)
Dept: PSYCHIATRY | Facility: CLINIC | Age: 35
End: 2025-03-25
Payer: COMMERCIAL

## 2025-03-25 VITALS
HEIGHT: 65 IN | BODY MASS INDEX: 28.89 KG/M2 | DIASTOLIC BLOOD PRESSURE: 72 MMHG | HEART RATE: 117 BPM | SYSTOLIC BLOOD PRESSURE: 147 MMHG | WEIGHT: 173.4 LBS

## 2025-03-25 DIAGNOSIS — F41.1 GENERALIZED ANXIETY DISORDER: ICD-10-CM

## 2025-03-25 DIAGNOSIS — F11.20 OPIOID TYPE DEPENDENCE, CONTINUOUS: ICD-10-CM

## 2025-03-25 DIAGNOSIS — M79.605 LEG PAIN, BILATERAL: ICD-10-CM

## 2025-03-25 DIAGNOSIS — F90.0 ATTENTION DEFICIT HYPERACTIVITY DISORDER (ADHD), PREDOMINANTLY INATTENTIVE TYPE: ICD-10-CM

## 2025-03-25 DIAGNOSIS — M79.604 LEG PAIN, BILATERAL: ICD-10-CM

## 2025-03-25 DIAGNOSIS — R41.840 IMPAIRED CONCENTRATION: ICD-10-CM

## 2025-03-25 DIAGNOSIS — Z79.899 MEDICATION MANAGEMENT: Primary | ICD-10-CM

## 2025-03-25 DIAGNOSIS — F51.01 PRIMARY INSOMNIA: ICD-10-CM

## 2025-03-25 LAB
AMPHET+METHAMPHET UR QL: NEGATIVE
AMPHETAMINES UR QL: NEGATIVE
BARBITURATES UR QL SCN: NEGATIVE
BENZODIAZ UR QL SCN: NEGATIVE
BUPRENORPHINE SERPL-MCNC: POSITIVE NG/ML
CANNABINOIDS SERPL QL: NEGATIVE
COCAINE UR QL: NEGATIVE
MDMA UR QL SCN: NEGATIVE
METHADONE UR QL SCN: NEGATIVE
MORPHINE/OPIATES SCREEN, URINE: NEGATIVE
OXYCODONE UR QL SCN: NEGATIVE
PCP UR QL SCN: NEGATIVE
PROPOXYPH UR QL SCN: NEGATIVE
TRICYCLICS UR QL SCN: NEGATIVE

## 2025-03-25 PROCEDURE — 1160F RVW MEDS BY RX/DR IN RCRD: CPT | Performed by: NURSE PRACTITIONER

## 2025-03-25 PROCEDURE — 80305 DRUG TEST PRSMV DIR OPT OBS: CPT | Performed by: NURSE PRACTITIONER

## 2025-03-25 PROCEDURE — 1159F MED LIST DOCD IN RCRD: CPT | Performed by: NURSE PRACTITIONER

## 2025-03-25 PROCEDURE — 99214 OFFICE O/P EST MOD 30 MIN: CPT | Performed by: NURSE PRACTITIONER

## 2025-03-25 RX ORDER — CLONIDINE HYDROCHLORIDE 0.1 MG/1
0.1 TABLET ORAL 2 TIMES DAILY
Qty: 180 TABLET | Refills: 0 | Status: SHIPPED | OUTPATIENT
Start: 2025-03-25

## 2025-03-25 RX ORDER — FLUOXETINE HYDROCHLORIDE 40 MG/1
40 CAPSULE ORAL DAILY
Qty: 30 CAPSULE | Refills: 0 | Status: SHIPPED | OUTPATIENT
Start: 2025-03-25 | End: 2026-03-25

## 2025-03-25 RX ORDER — QUETIAPINE FUMARATE 50 MG/1
150 TABLET, FILM COATED ORAL NIGHTLY
Qty: 90 TABLET | Refills: 0 | Status: SHIPPED | OUTPATIENT
Start: 2025-03-25

## 2025-03-25 RX ORDER — HYDROXYZINE PAMOATE 25 MG/1
25 CAPSULE ORAL EVERY 12 HOURS SCHEDULED
Qty: 60 CAPSULE | Refills: 0 | Status: SHIPPED | OUTPATIENT
Start: 2025-03-25

## 2025-03-25 RX ORDER — BUPRENORPHINE HYDROCHLORIDE AND NALOXONE HYDROCHLORIDE DIHYDRATE 8; 2 MG/1; MG/1
2 TABLET SUBLINGUAL DAILY
Qty: 56 TABLET | Refills: 0 | Status: SHIPPED | OUTPATIENT
Start: 2025-03-25

## 2025-03-25 RX ORDER — ATOMOXETINE 60 MG/1
60 CAPSULE ORAL DAILY
Qty: 30 CAPSULE | Refills: 0 | Status: SHIPPED | OUTPATIENT
Start: 2025-03-25 | End: 2026-03-25

## 2025-03-25 RX ORDER — GABAPENTIN 600 MG/1
600 TABLET ORAL 4 TIMES DAILY
Qty: 120 TABLET | Refills: 0 | Status: SHIPPED | OUTPATIENT
Start: 2025-03-25

## 2025-03-25 NOTE — PROGRESS NOTES
This provider is located at [facility]. The Patient is seen remotely located at the [facility] ([facility]) using Video. Patient is being seen via telehealth and confirm that they are in a secure environment for this session. The patient's condition being diagnosed/treated is appropriate for telemedicine. Provider identified as Xu Saeed as well as credentials APRN MSN FNP-C PUMA-JULISSA.   The client/patient gave consent to be seen remotely, and when consent is given they understand that the consent allows for patient identifiable information to be sent to a third party as needed.   They may refuse to be seen remotely at any time. The electronic data is encrypted and password protected, and the patient has been advised of the potential risks to privacy not withstanding such measures.    Chief Complaint/History of Present Illness: Follow Up buprenorphine/naloxone Medicated Assisted Treatment for Opiate Use Disorder     Patient/Client Concerns/Updates: Continue Prozac and clonidine for anxiety, Strattera for difficulty concentrating, gabapentin for chronic bilateral leg pain and Seroquel for treatment resistant anxiety and insomnia   -Patient reports no significant or concerning life events or changes since last evaluation; reports no acute life stressors patient overall reports stability  -Reports continued therapeutic benefit and satisfaction with current care plan; reports no concerning side effects with current medication prescribed  -Mood is stable; patient denies acute anxious episodes, exacerbations thereof or concerning panic attacks  -Reports no acute depressive episodes or concerns, no suicidal or homicidal thoughts  -Reports no perceptual disturbances or otherwise symptoms of psychosis  -Patient request to increase Seroquel will accommodate and increase 150 mg for insomnia    Triggers (Persons/Places/Things/Events/Thought/Emotions): Generalized anxiety and panic attacks difficulty  concentrating chronic leg pain and insomnia    Cravings/Substance Use: Denies cravings or use of illicit or nonprescribed substances    Relapse Prevention: Counseling    Urine Drug Screen (today's visit)/Presumptive Point of Care discussed: Positive buprenorphine, otherwise negative for substances tested    UDS Confirmation (Most recent/Resulted): None available of current clinical significance    Most recent pertinent laboratory studies reviewed: 10/24/2022-hepatic function within normal limits, Cr WNL     DONG (PDMP) Reviewed for Current/Active Medications: buprenorphine/naloxone and gabapentin as reviewed today    Past Surgical History:  Past Surgical History:   Procedure Laterality Date   • APPENDECTOMY     •  SECTION     • ENDOSCOPY N/A 10/28/2022    Procedure: ESOPHAGOGASTRODUODENOSCOPY;  Surgeon: Jayro Bar MD;  Location: [facility];  Service: Gastroenterology;  Laterality: N/A;       Problem List:  Patient Active Problem List   Diagnosis   • Esophageal dysphagia   • Iron deficiency anemia, unspecified   • Malabsorption of iron       Allergy:   Allergies   Allergen Reactions   • Atomoxetine Other (See Comments)     Skin crawling-per patient   • Diphenhydramine    • Sulfamethoxazole-Trimethoprim         Current Medications:   Current Outpatient Medications   Medication Sig Dispense Refill   • albuterol sulfate  (90 Base) MCG/ACT inhaler      • atomoxetine (STRATTERA) 60 MG capsule Take 1 capsule by mouth Daily. 30 capsule 0   • buprenorphine-naloxone (SUBOXONE) 8-2 MG per SL tablet Place 2 tablets under the tongue Daily. 56 tablet 0   • cloNIDine (CATAPRES) 0.1 MG tablet Take 1 tablet by mouth 2 (Two) Times a Day. 180 tablet 0   • FLUoxetine (PROzac) 40 MG capsule Take 1 capsule by mouth Daily. 30 capsule 0   • gabapentin (NEURONTIN) 600 MG tablet Take 1 tablet by mouth 4 (Four) Times a Day. 120 tablet 0   • hydrOXYzine pamoate (VISTARIL) 25 MG capsule Take 1 capsule by mouth Every 12  (Twelve) Hours. 60 capsule 0   • ibuprofen (ADVIL,MOTRIN) 200 MG tablet Take 4 tablets by mouth Every 12 (Twelve) Hours.     • medroxyPROGESTERone (DEPO-PROVERA) 150 MG/ML injection Inject 1 mL into the appropriate muscle as directed by prescriber Every 3 (Three) Months.     • QUEtiapine (SEROquel) 50 MG tablet Take 3 tablets by mouth Every Night. 90 tablet 0     No current facility-administered medications for this visit.       Past Medical History:  Past Medical History:   Diagnosis Date   • Acid reflux    • Anemia    • Anxiety    • Substance abuse          Social History     Socioeconomic History   • Marital status: Single   Tobacco Use   • Smoking status: Every Day     Current packs/day: 1.00     Average packs/day: 1 pack/day for 7.0 years (7.0 ttl pk-yrs)     Types: Cigarettes     Passive exposure: Current   • Smokeless tobacco: Never   Vaping Use   • Vaping status: Never Used   Substance and Sexual Activity   • Alcohol use: Not Currently   • Drug use: Not Currently     Types: Methamphetamines     Comment: per patient has been clean for about 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   • Sexual activity: Defer     Birth control/protection: Depo-provera         Family History   Problem Relation Age of Onset   • Heart disease Mother    • Heart disease Father    • Cancer Maternal Grandmother    • Cancer Maternal Grandfather          Mental Status Exam:   Hygiene:   good  Cooperation:  Cooperative  Eye Contact:  Good  Psychomotor Behavior:  Appropriate  Affect:  Appropriate  Mood: anxious  Speech:  Normal  Thought Process:  Goal directed  Thought Content:  Normal  Suicidal:  None  Homicidal:  None  Hallucinations:  None  Delusion:  None  Memory:  Intact  Orientation:  Grossly intact  Reliability:  good  Insight:  Good  Judgement:  Good  Impulse Control:  Good         Review of Systems:  Review of Systems   Constitutional:  Negative for activity change, chills, diaphoresis and fatigue.   Respiratory:  Negative for apnea, cough and  "shortness of breath.    Cardiovascular:  Negative for chest pain, palpitations and leg swelling.   Gastrointestinal:  Negative for abdominal pain, constipation, diarrhea, nausea and vomiting.   Genitourinary:  Negative for difficulty urinating.   Musculoskeletal:  Positive for arthralgias.   Skin:  Negative for rash.   Neurological:  Negative for dizziness, weakness and headaches.   Psychiatric/Behavioral:  Positive for decreased concentration. Negative for agitation, self-injury, sleep disturbance and suicidal ideas. The patient is nervous/anxious.        Physical Exam:  Physical Exam  Vitals reviewed.   Constitutional:       General: She is not in acute distress.     Appearance: Normal appearance. She is not ill-appearing or toxic-appearing.   Pulmonary:      Effort: Pulmonary effort is normal.   Musculoskeletal:         General: Normal range of motion.   Neurological:      General: No focal deficit present.      Mental Status: She is alert and oriented to person, place, and time.   Psychiatric:         Attention and Perception: Attention and perception normal.         Mood and Affect: Mood normal. Mood is anxious. Mood is not depressed.         Speech: Speech normal.         Behavior: Behavior normal. Behavior is cooperative.         Thought Content: Thought content normal.         Cognition and Memory: Cognition and memory normal.         Judgment: Judgment normal.     Vital Signs:   /72   Pulse 117   Ht 165.1 cm (65\")   Wt 78.7 kg (173 lb 6.4 oz)   BMI 28.86 kg/m²      Lab Results:   Telemedicine on 03/25/2025   Component Date Value Ref Range Status   • Amphetamine Screen, Urine 03/25/2025 Negative  Negative Final   • Barbiturates Screen, Urine 03/25/2025 Negative  Negative Final   • Buprenorphine, Screen, Urine 03/25/2025 Positive (A)  Negative Final   • Benzodiazepine Screen, Urine 03/25/2025 Negative  Negative Final   • Cocaine Screen, Urine 03/25/2025 Negative  Negative Final   • MDMA (ECSTASY) " 03/25/2025 Negative  Negative Final   • Methamphetamine, Ur 03/25/2025 Negative  Negative Final   • Methadone Screen, Urine 03/25/2025 Negative  Negative Final   • Morphine/Opiates Screen, Urine 03/25/2025 Negative  Negative Final   • Oxycodone Screen, Urine 03/25/2025 Negative  Negative Final   • Phencyclidine (PCP), Urine 03/25/2025 Negative  Negative Final   • Propoxyphene Scree, Urine 03/25/2025 Negative  Negative Final   • Tricyclic Antidepressants Screen 03/25/2025 Negative  Negative Final   • THC, Screen, Urine 03/25/2025 Negative  Negative Final   Telemedicine on 12/19/2024   Component Date Value Ref Range Status   • Amphetamine Screen, Urine 12/19/2024 Negative  Negative Final   • Barbiturates Screen, Urine 12/19/2024 Negative  Negative Final   • Buprenorphine, Screen, Urine 12/19/2024 Positive (A)  Negative Final   • Benzodiazepine Screen, Urine 12/19/2024 Negative  Negative Final   • Cocaine Screen, Urine 12/19/2024 Negative  Negative Final   • MDMA (ECSTASY) 12/19/2024 Negative  Negative Final   • Methamphetamine, Ur 12/19/2024 Negative  Negative Final   • Methadone Screen, Urine 12/19/2024 Negative  Negative Final   • Morphine/Opiates Screen, Urine 12/19/2024 Negative  Negative Final   • Oxycodone Screen, Urine 12/19/2024 Negative  Negative Final   • Phencyclidine (PCP), Urine 12/19/2024 Negative  Negative Final   • Propoxyphene Scree, Urine 12/19/2024 Negative  Negative Final   • Tricyclic Antidepressants Screen 12/19/2024 Negative  Negative Final   • THC, Screen, Urine 12/19/2024 Negative  Negative Final   • Gabapentin 12/19/2024 negative   Final   • ETHANOL URINE SCREEN 12/19/2024 Negative   Final   • Fentanyl, Urine 12/19/2024 Negative  Negative Final   Telemedicine on 11/19/2024   Component Date Value Ref Range Status   • Amphetamine Screen, Urine 11/19/2024 Positive (A)  Negative Final    PRELIMINARY RESULTS. PLEASE REFER TO [facility] LAB CONFIRMATION FOR FINAL RESULT   • Barbiturates Screen, Urine  11/19/2024 Negative  Negative Final   • Buprenorphine, Screen, Urine 11/19/2024 Positive (A)  Negative Final   • Benzodiazepine Screen, Urine 11/19/2024 Negative  Negative Final   • Cocaine Screen, Urine 11/19/2024 Negative  Negative Final   • MDMA (ECSTASY) 11/19/2024 Negative  Negative Final   • Methamphetamine, Ur 11/19/2024 Negative  Negative Final   • Methadone Screen, Urine 11/19/2024 Negative  Negative Final   • Morphine/Opiates Screen, Urine 11/19/2024 Negative  Negative Final   • Oxycodone Screen, Urine 11/19/2024 Negative  Negative Final   • Phencyclidine (PCP), Urine 11/19/2024 Negative  Negative Final   • Propoxyphene Scree, Urine 11/19/2024 Negative  Negative Final   • Tricyclic Antidepressants Screen 11/19/2024 Negative  Negative Final   • THC, Screen, Urine 11/19/2024 Negative  Negative Final   • Fentanyl, Urine 11/19/2024 Negative  Negative Final   • ETHANOL URINE SCREEN 11/19/2024 Negative   Final   • Gabapentin 11/19/2024 NEGATIVE   Final   Telemedicine on 10/16/2024   Component Date Value Ref Range Status   • Amphetamine Screen, Urine 10/16/2024 Negative  Negative Final    PRELIMINARY RESULTS. PLEASE REFER TO [facility] LAB CONFIRMATION FOR FINAL RESULTS   • Barbiturates Screen, Urine 10/16/2024 Negative  Negative Final   • Buprenorphine, Screen, Urine 10/16/2024 Positive (A)  Negative Final   • Benzodiazepine Screen, Urine 10/16/2024 Negative  Negative Final   • Cocaine Screen, Urine 10/16/2024 Negative  Negative Final   • MDMA (ECSTASY) 10/16/2024 Negative  Negative Final   • Methamphetamine, Ur 10/16/2024 Negative  Negative Final   • Methadone Screen, Urine 10/16/2024 Negative  Negative Final   • Morphine/Opiates Screen, Urine 10/16/2024 Negative  Negative Final   • Oxycodone Screen, Urine 10/16/2024 Negative  Negative Final   • Phencyclidine (PCP), Urine 10/16/2024 Negative  Negative Final   • Propoxyphene Scree, Urine 10/16/2024 Negative  Negative Final   • Tricyclic Antidepressants Screen  10/16/2024 Negative  Negative Final   • THC, Screen, Urine 10/16/2024 Negative  Negative Final   • ETHANOL URINE SCREEN 10/16/2024 Negative   Final   • Fentanyl 10/16/2024 NEGATIVE   Final   • Gabapentin, Ur 10/16/2024 NEGATIVE   Final         Assessment & Plan   Diagnoses and all orders for this visit:    1. Opioid type dependence, continuous (Primary)  -     buprenorphine-naloxone (SUBOXONE) 8-2 MG per SL tablet; Place 2 tablets under the tongue Daily.  Dispense: 56 tablet; Refill: 0    2. Medication management  -     POC Medline 14 Panel Urine Drug Screen  -     gabapentin (NEURONTIN) 600 MG tablet; Take 1 tablet by mouth 4 (Four) Times a Day.  Dispense: 120 tablet; Refill: 0    3. Generalized anxiety disorder  -     QUEtiapine (SEROquel) 50 MG tablet; Take 3 tablets by mouth Every Night.  Dispense: 90 tablet; Refill: 0  -     FLUoxetine (PROzac) 40 MG capsule; Take 1 capsule by mouth Daily.  Dispense: 30 capsule; Refill: 0  -     hydrOXYzine pamoate (VISTARIL) 25 MG capsule; Take 1 capsule by mouth Every 12 (Twelve) Hours.  Dispense: 60 capsule; Refill: 0  -     cloNIDine (CATAPRES) 0.1 MG tablet; Take 1 tablet by mouth 2 (Two) Times a Day.  Dispense: 180 tablet; Refill: 0    4. Primary insomnia  -     QUEtiapine (SEROquel) 50 MG tablet; Take 3 tablets by mouth Every Night.  Dispense: 90 tablet; Refill: 0    5. Attention deficit hyperactivity disorder (ADHD), predominantly inattentive type  -     atomoxetine (STRATTERA) 60 MG capsule; Take 1 capsule by mouth Daily.  Dispense: 30 capsule; Refill: 0    6. Impaired concentration  -     atomoxetine (STRATTERA) 60 MG capsule; Take 1 capsule by mouth Daily.  Dispense: 30 capsule; Refill: 0    7. Leg pain, bilateral        Visit Diagnoses:    ICD-10-CM ICD-9-CM   1. Opioid type dependence, continuous  F11.20 304.01   2. Medication management  Z79.899 V58.69   3. Generalized anxiety disorder  F41.1 300.02   4. Primary insomnia  F51.01 307.42   5. Attention deficit  hyperactivity disorder (ADHD), predominantly inattentive type  F90.0 314.00   6. Impaired concentration  R41.840 799.51   7. Leg pain, bilateral  M79.604 729.5    M79.605        PLAN:  Safety: No acute safety concerns  Risk Assessment: Risk of self-harm acutely is low. Risk of self-harm chronically is also low, but could be further elevated in the event of treatment noncompliance and/or AODA.    TREATMENT PLAN/GOALS: Continue supportive psychotherapy efforts and medications as indicated. Treatment and medication options discussed during today's visit. Patient acknowledged and verbally consented to continue with current treatment plan and was educated on the importance of compliance with treatment and follow-up appointments.    MEDICATION ISSUES:  DONG reviewed as expected.  Discussed medication options and treatment plan of prescribed medication as well as the risks, benefits, and side effects including potential falls, possible impaired driving and metabolic adversities among others. Patient is agreeable to call the office with any worsening of symptoms or onset of side effects. Patient is agreeable to call 911 or go to the nearest ER should he/she begin having SI/HI. No medication side effects or related complaints today.     MEDS ORDERED DURING VISIT:  New Medications Ordered This Visit   Medications   • buprenorphine-naloxone (SUBOXONE) 8-2 MG per SL tablet     Sig: Place 2 tablets under the tongue Daily.     Dispense:  56 tablet     Refill:  0     RAFAELN:[id number]   • QUEtiapine (SEROquel) 50 MG tablet     Sig: Take 3 tablets by mouth Every Night.     Dispense:  90 tablet     Refill:  0   • FLUoxetine (PROzac) 40 MG capsule     Sig: Take 1 capsule by mouth Daily.     Dispense:  30 capsule     Refill:  0   • atomoxetine (STRATTERA) 60 MG capsule     Sig: Take 1 capsule by mouth Daily.     Dispense:  30 capsule     Refill:  0   • gabapentin (NEURONTIN) 600 MG tablet     Sig: Take 1 tablet by mouth 4 (Four)  Times a Day.     Dispense:  120 tablet     Refill:  0   • hydrOXYzine pamoate (VISTARIL) 25 MG capsule     Sig: Take 1 capsule by mouth Every 12 (Twelve) Hours.     Dispense:  60 capsule     Refill:  0   • cloNIDine (CATAPRES) 0.1 MG tablet     Sig: Take 1 tablet by mouth 2 (Two) Times a Day.     Dispense:  180 tablet     Refill:  0       No follow-ups on file.           This document has been electronically signed by ALMA Garcia  March 25, 2025 15:02 EDT      Part of this note may be an electronic transcription/translation of spoken language to printed text using the Dragon Dictation System.

## 2025-03-26 LAB
6-ACETYLMORPHINE: NOT DETECTED NG/ML
ALCOHOL, ETHYL: NOT DETECTED NG/ML
AMPHETAMINE: NOT DETECTED NG/ML
BENZODIAZ BLD QL: NOT DETECTED NG/ML
BUPRENORPHINE SAL CFM-MCNC: DETECTED NG/ML
COCAINE METABOLITE: NOT DETECTED NG/ML
EDDP SERPL QL: NOT DETECTED NG/ML
FENTANYL-EIA: NOT DETECTED NG/ML
METHAMPHETAMINE: NOT DETECTED NG/ML
OPIATES: NOT DETECTED NG/ML
OXYCODONE: NOT DETECTED NG/ML
PCP: NOT DETECTED NG/ML
THC: NOT DETECTED NG/ML
TRICYCLIC ANTIDEPRESSANTS: NOT DETECTED NG/ML

## 2025-03-27 LAB — REF LAB TEST METHOD: NORMAL

## 2025-04-30 DIAGNOSIS — F90.0 ATTENTION DEFICIT HYPERACTIVITY DISORDER (ADHD), PREDOMINANTLY INATTENTIVE TYPE: ICD-10-CM

## 2025-04-30 DIAGNOSIS — R41.840 IMPAIRED CONCENTRATION: ICD-10-CM

## 2025-04-30 RX ORDER — ATOMOXETINE 60 MG/1
60 CAPSULE ORAL DAILY
Qty: 30 CAPSULE | Refills: 0 | Status: SHIPPED | OUTPATIENT
Start: 2025-04-30

## 2025-05-15 ENCOUNTER — TELEMEDICINE (OUTPATIENT)
Dept: PSYCHIATRY | Facility: CLINIC | Age: 35
End: 2025-05-15
Payer: COMMERCIAL

## 2025-05-15 VITALS
BODY MASS INDEX: 28.32 KG/M2 | SYSTOLIC BLOOD PRESSURE: 151 MMHG | HEIGHT: 65 IN | DIASTOLIC BLOOD PRESSURE: 88 MMHG | WEIGHT: 170 LBS | HEART RATE: 129 BPM

## 2025-05-15 DIAGNOSIS — F11.20 OPIOID TYPE DEPENDENCE, CONTINUOUS: Primary | ICD-10-CM

## 2025-05-15 DIAGNOSIS — F90.0 ATTENTION DEFICIT HYPERACTIVITY DISORDER (ADHD), PREDOMINANTLY INATTENTIVE TYPE: ICD-10-CM

## 2025-05-15 DIAGNOSIS — F51.01 PRIMARY INSOMNIA: ICD-10-CM

## 2025-05-15 DIAGNOSIS — Z79.899 MEDICATION MANAGEMENT: ICD-10-CM

## 2025-05-15 DIAGNOSIS — R41.840 IMPAIRED CONCENTRATION: ICD-10-CM

## 2025-05-15 DIAGNOSIS — F31.60 BIPOLAR AFFECTIVE DISORDER, MIXED: ICD-10-CM

## 2025-05-15 DIAGNOSIS — F41.1 GENERALIZED ANXIETY DISORDER: ICD-10-CM

## 2025-05-15 LAB
AMPHET+METHAMPHET UR QL: POSITIVE
AMPHETAMINES UR QL: POSITIVE
BARBITURATES UR QL SCN: NEGATIVE
BENZODIAZ UR QL SCN: NEGATIVE
BUPRENORPHINE SERPL-MCNC: POSITIVE NG/ML
CANNABINOIDS SERPL QL: NEGATIVE
COCAINE UR QL: NEGATIVE
MDMA UR QL SCN: POSITIVE
METHADONE UR QL SCN: NEGATIVE
MORPHINE/OPIATES SCREEN, URINE: NEGATIVE
OXYCODONE UR QL SCN: NEGATIVE
PCP UR QL SCN: NEGATIVE
PROPOXYPH UR QL SCN: NEGATIVE
TRICYCLICS UR QL SCN: NEGATIVE

## 2025-05-15 RX ORDER — GABAPENTIN 600 MG/1
600 TABLET ORAL 4 TIMES DAILY
Qty: 120 TABLET | Refills: 0 | Status: SHIPPED | OUTPATIENT
Start: 2025-05-15

## 2025-05-15 RX ORDER — HYDROXYZINE PAMOATE 25 MG/1
25 CAPSULE ORAL EVERY 12 HOURS SCHEDULED
Qty: 60 CAPSULE | Refills: 0 | Status: SHIPPED | OUTPATIENT
Start: 2025-05-15

## 2025-05-15 RX ORDER — CLONIDINE HYDROCHLORIDE 0.1 MG/1
0.1 TABLET ORAL 2 TIMES DAILY
Qty: 180 TABLET | Refills: 0 | Status: SHIPPED | OUTPATIENT
Start: 2025-05-15

## 2025-05-15 RX ORDER — FLUOXETINE HYDROCHLORIDE 40 MG/1
40 CAPSULE ORAL DAILY
Qty: 30 CAPSULE | Refills: 0 | Status: SHIPPED | OUTPATIENT
Start: 2025-05-15 | End: 2026-05-15

## 2025-05-15 RX ORDER — LAMOTRIGINE 25 MG/1
25 TABLET ORAL DAILY
Qty: 30 TABLET | Refills: 0 | Status: SHIPPED | OUTPATIENT
Start: 2025-05-15 | End: 2026-05-15

## 2025-05-15 RX ORDER — ATOMOXETINE 60 MG/1
60 CAPSULE ORAL DAILY
Qty: 30 CAPSULE | Refills: 0 | Status: SHIPPED | OUTPATIENT
Start: 2025-05-15

## 2025-05-15 RX ORDER — QUETIAPINE FUMARATE 50 MG/1
150 TABLET, FILM COATED ORAL NIGHTLY
Qty: 90 TABLET | Refills: 0 | Status: SHIPPED | OUTPATIENT
Start: 2025-05-15

## 2025-05-15 RX ORDER — BUPRENORPHINE HYDROCHLORIDE AND NALOXONE HYDROCHLORIDE DIHYDRATE 8; 2 MG/1; MG/1
2 TABLET SUBLINGUAL DAILY
Qty: 56 TABLET | Refills: 0 | Status: SHIPPED | OUTPATIENT
Start: 2025-05-15

## 2025-05-15 NOTE — PROGRESS NOTES
This provider is located at [facility]. The Patient is seen remotely located at the [facility] ([facility]) using Video. Patient is being seen via telehealth and confirm that they are in a secure environment for this session. The patient's condition being diagnosed/treated is appropriate for telemedicine. Provider identified as Xu Saeed as well as credentials APRN MSN FNP-C [id number].   The client/patient gave consent to be seen remotely, and when consent is given they understand that the consent allows for patient identifiable information to be sent to a third party as needed.   They may refuse to be seen remotely at any time. The electronic data is encrypted and password protected, and the patient has been advised of the potential risks to privacy not withstanding such measures.    Chief Complaint/History of Present Illness: Follow Up buprenorphine/naloxone Medicated Assisted Treatment for Opiate Use Disorder     Patient/Client Concerns/Updates: Continue Prozac and clonidine for anxiety, Strattera for difficulty concentrating, gabapentin for chronic bilateral leg pain and Seroquel for treatment resistant anxiety and insomnia, Lamictal for mood stabilization  - Patient request medication adjustment to help with fluctuating mood; patient reports she rapidly experiences depressive symptoms and subsequently periods of agitation and anxiety patient reports she is concerned she is bipolar; add Lamictal 25 mg daily for such; patient advised to cease medication immediately and seek emergent evaluation if any rashes develop serious rash  - Patient denies suicidal or homicidal thoughts  - Denies symptoms of psychosis or otherwise perceptual disturbance    Triggers (Persons/Places/Things/Events/Thought/Emotions): Mood fluctuations difficulty concentrating chronic pain    Cravings/Substance Use: Denies cravings or use of illicit or nonprescribed substances    Relapse Prevention: Counseling    Urine  Drug Screen (today's visit)/Presumptive Point of Care discussed: Positive buprenorphine positive amphetamine and methamphetamine-we will assess urinalysis objective results to substantiate or rule out this presumptive positive for methamphetamine    UDS Confirmation (Most recent/Resulted): Positive buprenorphine/nor-buprenorphine, no concerns for urine tampering otherwise positive gabapentin    Most recent pertinent laboratory studies reviewed: 10/24/2022-hepatic function within normal limits, Cr WNL     DONG (PDMP) Reviewed for Current/Active Medications: buprenorphine/naloxone and gabapentin as reviewed today    Past Surgical History:  Past Surgical History:   Procedure Laterality Date   • APPENDECTOMY     •  SECTION     • ENDOSCOPY N/A 10/28/2022    Procedure: ESOPHAGOGASTRODUODENOSCOPY;  Surgeon: Jayro Bar MD;  Location: [facility];  Service: Gastroenterology;  Laterality: N/A;       Problem List:  Patient Active Problem List   Diagnosis   • Esophageal dysphagia   • Iron deficiency anemia, unspecified   • Malabsorption of iron       Allergy:   Allergies   Allergen Reactions   • Atomoxetine Other (See Comments)     Skin crawling-per patient   • Diphenhydramine    • Sulfamethoxazole-Trimethoprim         Current Medications:   Current Outpatient Medications   Medication Sig Dispense Refill   • albuterol sulfate  (90 Base) MCG/ACT inhaler      • atomoxetine (STRATTERA) 60 MG capsule Take 1 capsule by mouth Daily. 30 capsule 0   • buprenorphine-naloxone (SUBOXONE) 8-2 MG per SL tablet Place 2 tablets under the tongue Daily. 56 tablet 0   • cloNIDine (CATAPRES) 0.1 MG tablet Take 1 tablet by mouth 2 (Two) Times a Day. 180 tablet 0   • FLUoxetine (PROzac) 40 MG capsule Take 1 capsule by mouth Daily. 30 capsule 0   • gabapentin (NEURONTIN) 600 MG tablet Take 1 tablet by mouth 4 (Four) Times a Day. 120 tablet 0   • hydrOXYzine pamoate (VISTARIL) 25 MG capsule Take 1 capsule by mouth Every 12  (Twelve) Hours. 60 capsule 0   • ibuprofen (ADVIL,MOTRIN) 200 MG tablet Take 4 tablets by mouth Every 12 (Twelve) Hours.     • medroxyPROGESTERone (DEPO-PROVERA) 150 MG/ML injection Inject 1 mL into the appropriate muscle as directed by prescriber Every 3 (Three) Months.     • QUEtiapine (SEROquel) 50 MG tablet Take 3 tablets by mouth Every Night. 90 tablet 0   • lamoTRIgine (LaMICtal) 25 MG tablet Take 1 tablet by mouth Daily. 30 tablet 0     No current facility-administered medications for this visit.       Past Medical History:  Past Medical History:   Diagnosis Date   • Acid reflux    • Anemia    • Anxiety    • Substance abuse          Social History     Socioeconomic History   • Marital status: Single   Tobacco Use   • Smoking status: Every Day     Current packs/day: 1.00     Average packs/day: 1 pack/day for 7.0 years (7.0 ttl pk-yrs)     Types: Cigarettes     Passive exposure: Current   • Smokeless tobacco: Never   Vaping Use   • Vaping status: Never Used   Substance and Sexual Activity   • Alcohol use: Not Currently   • Drug use: Not Currently     Types: Methamphetamines     Comment: per patient has been clean for about 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   • Sexual activity: Defer     Birth control/protection: Depo-provera         Family History   Problem Relation Age of Onset   • Heart disease Mother    • Heart disease Father    • Cancer Maternal Grandmother    • Cancer Maternal Grandfather          Mental Status Exam:   Hygiene:   good  Cooperation:  Cooperative  Eye Contact:  Good  Psychomotor Behavior:  Appropriate  Affect:  Appropriate  Mood: anxious  Speech:  Normal  Thought Process:  Goal directed  Thought Content:  Normal  Suicidal:  None  Homicidal:  None  Hallucinations:  None  Delusion:  None  Memory:  Intact  Orientation:  Grossly intact  Reliability:  good  Insight:  Good  Judgement:  Good  Impulse Control:  Good         Review of Systems:  Review of Systems   Constitutional:  Negative for activity change,  "chills, diaphoresis and fatigue.   Respiratory:  Negative for apnea, cough and shortness of breath.    Cardiovascular:  Negative for chest pain, palpitations and leg swelling.   Gastrointestinal:  Negative for abdominal pain, constipation, diarrhea, nausea and vomiting.   Genitourinary:  Negative for difficulty urinating.   Musculoskeletal:  Positive for arthralgias.   Skin:  Negative for rash.   Neurological:  Negative for dizziness, weakness and headaches.   Psychiatric/Behavioral:  Positive for dysphoric mood. Negative for agitation, self-injury, sleep disturbance and suicidal ideas. The patient is nervous/anxious.        Physical Exam:  Physical Exam  Vitals reviewed.   Constitutional:       General: She is not in acute distress.     Appearance: Normal appearance. She is not ill-appearing or toxic-appearing.   Pulmonary:      Effort: Pulmonary effort is normal.   Musculoskeletal:         General: Normal range of motion.   Neurological:      General: No focal deficit present.      Mental Status: She is alert and oriented to person, place, and time.   Psychiatric:         Attention and Perception: Attention and perception normal.         Mood and Affect: Mood is anxious. Mood is not depressed.         Speech: Speech normal.         Behavior: Behavior normal. Behavior is cooperative.         Thought Content: Thought content normal.         Cognition and Memory: Cognition and memory normal.         Judgment: Judgment normal.     Vital Signs:   /88   Pulse (!) 129   Ht 165.1 cm (65\")   Wt 77.1 kg (170 lb)   BMI 28.29 kg/m²      Lab Results:   Telemedicine on 05/15/2025   Component Date Value Ref Range Status   • Amphetamine Screen, Urine 05/15/2025 Positive (A)  Negative Final   • Barbiturates Screen, Urine 05/15/2025 Negative  Negative Final   • Buprenorphine, Screen, Urine 05/15/2025 Positive (A)  Negative Final   • Benzodiazepine Screen, Urine 05/15/2025 Negative  Negative Final   • Cocaine Screen, Urine " 05/15/2025 Negative  Negative Final   • MDMA (ECSTASY) 05/15/2025 Positive (A)  Negative Final   • Methamphetamine, Ur 05/15/2025 Positive (A)  Negative Final   • Methadone Screen, Urine 05/15/2025 Negative  Negative Final   • Morphine/Opiates Screen, Urine 05/15/2025 Negative  Negative Final   • Oxycodone Screen, Urine 05/15/2025 Negative  Negative Final   • Phencyclidine (PCP), Urine 05/15/2025 Negative  Negative Final   • Propoxyphene Scree, Urine 05/15/2025 Negative  Negative Final   • Tricyclic Antidepressants Screen 05/15/2025 Negative  Negative Final   • THC, Screen, Urine 05/15/2025 Negative  Negative Final   Telemedicine on 03/25/2025   Component Date Value Ref Range Status   • Amphetamine Screen, Urine 03/25/2025 Negative  Negative Final   • Barbiturates Screen, Urine 03/25/2025 Negative  Negative Final   • Buprenorphine, Screen, Urine 03/25/2025 Positive (A)  Negative Final   • Benzodiazepine Screen, Urine 03/25/2025 Negative  Negative Final   • Cocaine Screen, Urine 03/25/2025 Negative  Negative Final   • MDMA (ECSTASY) 03/25/2025 Negative  Negative Final   • Methamphetamine, Ur 03/25/2025 Negative  Negative Final   • Methadone Screen, Urine 03/25/2025 Negative  Negative Final   • Morphine/Opiates Screen, Urine 03/25/2025 Negative  Negative Final   • Oxycodone Screen, Urine 03/25/2025 Negative  Negative Final   • Phencyclidine (PCP), Urine 03/25/2025 Negative  Negative Final   • Propoxyphene Scree, Urine 03/25/2025 Negative  Negative Final   • Tricyclic Antidepressants Screen 03/25/2025 Negative  Negative Final   • THC, Screen, Urine 03/25/2025 Negative  Negative Final   • 6-ACETYLMORPHINE 03/25/2025 Not Detected  10 ng/mL ng/mL Final   • AMPHETAMINE 03/25/2025 Not Detected  500 ng/mL ng/mL Final   • Barbiturates 03/25/2025 Not Detected  200 ng/mL ng/mL Final   • Benzodiazepines 03/25/2025 Not Detected  200 ng/mL ng/mL Final   • BUPRENORPHINE 03/25/2025 Detected  5 ng/mL ng/mL Final   • Cocaine  Metabolite 03/25/2025 Not Detected  150 ng/mL ng/mL Final   • EDDP 03/25/2025 Not Detected  100 ng/mL ng/mL Final   • ALCOHOL, ETHYL 03/25/2025 Not Detected  <100.0000 ng/mL Final   • FENTANYL 03/25/2025 Not Detected  1 ng/mL ng/mL Final   • METHAMPHETAMINE 03/25/2025 Not Detected  500 ng/mL ng/mL Final   • Opiates 03/25/2025 Not Detected  300 ng/mL ng/mL Final   • OXYCODONE 03/25/2025 Not Detected  100 ng/mL ng/mL Final   • PCP 03/25/2025 Not Detected  25 ng/mL ng/mL Final   • THC 03/25/2025 Not Detected  50 ng/mL ng/mL Final   • TRICYCLIC ANTIDEPRESSANTS 03/25/2025 Not Detected  300 ng/mL ng/mL Final   • Reference Lab Report 03/25/2025    Final    See Full Screen for results.   Telemedicine on 12/19/2024   Component Date Value Ref Range Status   • Amphetamine Screen, Urine 12/19/2024 Negative  Negative Final   • Barbiturates Screen, Urine 12/19/2024 Negative  Negative Final   • Buprenorphine, Screen, Urine 12/19/2024 Positive (A)  Negative Final   • Benzodiazepine Screen, Urine 12/19/2024 Negative  Negative Final   • Cocaine Screen, Urine 12/19/2024 Negative  Negative Final   • MDMA (ECSTASY) 12/19/2024 Negative  Negative Final   • Methamphetamine, Ur 12/19/2024 Negative  Negative Final   • Methadone Screen, Urine 12/19/2024 Negative  Negative Final   • Morphine/Opiates Screen, Urine 12/19/2024 Negative  Negative Final   • Oxycodone Screen, Urine 12/19/2024 Negative  Negative Final   • Phencyclidine (PCP), Urine 12/19/2024 Negative  Negative Final   • Propoxyphene Scree, Urine 12/19/2024 Negative  Negative Final   • Tricyclic Antidepressants Screen 12/19/2024 Negative  Negative Final   • THC, Screen, Urine 12/19/2024 Negative  Negative Final   • Gabapentin 12/19/2024 negative   Final   • ETHANOL URINE SCREEN 12/19/2024 Negative   Final   • Fentanyl, Urine 12/19/2024 Negative  Negative Final   Telemedicine on 11/19/2024   Component Date Value Ref Range Status   • Amphetamine Screen, Urine 11/19/2024 Positive (A)   Negative Final    PRELIMINARY RESULTS. PLEASE REFER TO [facility] LAB CONFIRMATION FOR FINAL RESULT   • Barbiturates Screen, Urine 11/19/2024 Negative  Negative Final   • Buprenorphine, Screen, Urine 11/19/2024 Positive (A)  Negative Final   • Benzodiazepine Screen, Urine 11/19/2024 Negative  Negative Final   • Cocaine Screen, Urine 11/19/2024 Negative  Negative Final   • MDMA (ECSTASY) 11/19/2024 Negative  Negative Final   • Methamphetamine, Ur 11/19/2024 Negative  Negative Final   • Methadone Screen, Urine 11/19/2024 Negative  Negative Final   • Morphine/Opiates Screen, Urine 11/19/2024 Negative  Negative Final   • Oxycodone Screen, Urine 11/19/2024 Negative  Negative Final   • Phencyclidine (PCP), Urine 11/19/2024 Negative  Negative Final   • Propoxyphene Scree, Urine 11/19/2024 Negative  Negative Final   • Tricyclic Antidepressants Screen 11/19/2024 Negative  Negative Final   • THC, Screen, Urine 11/19/2024 Negative  Negative Final   • Fentanyl, Urine 11/19/2024 Negative  Negative Final   • ETHANOL URINE SCREEN 11/19/2024 Negative   Final   • Gabapentin 11/19/2024 NEGATIVE   Final         Assessment & Plan   Diagnoses and all orders for this visit:    1. Opioid type dependence, continuous (Primary)  -     buprenorphine-naloxone (SUBOXONE) 8-2 MG per SL tablet; Place 2 tablets under the tongue Daily.  Dispense: 56 tablet; Refill: 0    2. Medication management  -     POC Medline 14 Panel Urine Drug Screen  -     Behavioral Health Full Screen and Definitive Testing ([facility]) -; Future  -     Buprenorphine/Norbuprenorphine level, QUANT ([facility]) - Urine, Clean Catch; Future  -     Gabapentin - PTNCA ([facility]) - Urine, Clean Catch; Future  -     Behavioral Health Full Screen and Definitive Testing ([facility]) -  -     Buprenorphine/Norbuprenorphine level, QUANT ([facility]) - Urine, Clean Catch  -     Gabapentin - PTNCA ([facility]) - Urine, Clean Catch  -     gabapentin (NEURONTIN) 600 MG tablet; Take 1 tablet by mouth 4 (Four) Times a Day.   Dispense: 120 tablet; Refill: 0    3. Generalized anxiety disorder  -     QUEtiapine (SEROquel) 50 MG tablet; Take 3 tablets by mouth Every Night.  Dispense: 90 tablet; Refill: 0  -     hydrOXYzine pamoate (VISTARIL) 25 MG capsule; Take 1 capsule by mouth Every 12 (Twelve) Hours.  Dispense: 60 capsule; Refill: 0  -     FLUoxetine (PROzac) 40 MG capsule; Take 1 capsule by mouth Daily.  Dispense: 30 capsule; Refill: 0  -     cloNIDine (CATAPRES) 0.1 MG tablet; Take 1 tablet by mouth 2 (Two) Times a Day.  Dispense: 180 tablet; Refill: 0    4. Primary insomnia  -     QUEtiapine (SEROquel) 50 MG tablet; Take 3 tablets by mouth Every Night.  Dispense: 90 tablet; Refill: 0    5. Attention deficit hyperactivity disorder (ADHD), predominantly inattentive type  -     atomoxetine (STRATTERA) 60 MG capsule; Take 1 capsule by mouth Daily.  Dispense: 30 capsule; Refill: 0    6. Impaired concentration  -     atomoxetine (STRATTERA) 60 MG capsule; Take 1 capsule by mouth Daily.  Dispense: 30 capsule; Refill: 0    7. Bipolar affective disorder, mixed  -     lamoTRIgine (LaMICtal) 25 MG tablet; Take 1 tablet by mouth Daily.  Dispense: 30 tablet; Refill: 0        Visit Diagnoses:    ICD-10-CM ICD-9-CM   1. Opioid type dependence, continuous  F11.20 304.01   2. Medication management  Z79.899 V58.69   3. Generalized anxiety disorder  F41.1 300.02   4. Primary insomnia  F51.01 307.42   5. Attention deficit hyperactivity disorder (ADHD), predominantly inattentive type  F90.0 314.00   6. Impaired concentration  R41.840 799.51   7. Bipolar affective disorder, mixed  F31.60 296.60       PLAN:  Safety: No acute safety concerns  Risk Assessment: Risk of self-harm acutely is low. Risk of self-harm chronically is also low, but could be further elevated in the event of treatment noncompliance and/or AODA.    TREATMENT PLAN/GOALS: Continue supportive psychotherapy efforts and medications as indicated. Treatment and medication options discussed  during today's visit. Patient acknowledged and verbally consented to continue with current treatment plan and was educated on the importance of compliance with treatment and follow-up appointments.    MEDICATION ISSUES:  DONG reviewed as expected.  Discussed medication options and treatment plan of prescribed medication as well as the risks, benefits, and side effects including potential falls, possible impaired driving and metabolic adversities among others. Patient is agreeable to call the office with any worsening of symptoms or onset of side effects. Patient is agreeable to call 911 or go to the nearest ER should he/she begin having SI/HI. No medication side effects or related complaints today.     MEDS ORDERED DURING VISIT:  New Medications Ordered This Visit   Medications   • buprenorphine-naloxone (SUBOXONE) 8-2 MG per SL tablet     Sig: Place 2 tablets under the tongue Daily.     Dispense:  56 tablet     Refill:  0     NADBURKEN:[id number]   • QUEtiapine (SEROquel) 50 MG tablet     Sig: Take 3 tablets by mouth Every Night.     Dispense:  90 tablet     Refill:  0   • hydrOXYzine pamoate (VISTARIL) 25 MG capsule     Sig: Take 1 capsule by mouth Every 12 (Twelve) Hours.     Dispense:  60 capsule     Refill:  0   • FLUoxetine (PROzac) 40 MG capsule     Sig: Take 1 capsule by mouth Daily.     Dispense:  30 capsule     Refill:  0   • atomoxetine (STRATTERA) 60 MG capsule     Sig: Take 1 capsule by mouth Daily.     Dispense:  30 capsule     Refill:  0   • gabapentin (NEURONTIN) 600 MG tablet     Sig: Take 1 tablet by mouth 4 (Four) Times a Day.     Dispense:  120 tablet     Refill:  0   • lamoTRIgine (LaMICtal) 25 MG tablet     Sig: Take 1 tablet by mouth Daily.     Dispense:  30 tablet     Refill:  0   • cloNIDine (CATAPRES) 0.1 MG tablet     Sig: Take 1 tablet by mouth 2 (Two) Times a Day.     Dispense:  180 tablet     Refill:  0       No follow-ups on file.           This document has been electronically signed by  Xu Saeed, APRN  May 15, 2025 16:30 EDT      Part of this note may be an electronic transcription/translation of spoken language to printed text using the Dragon Dictation System.

## 2025-05-17 LAB — REF LAB TEST METHOD: NORMAL

## 2025-05-19 LAB
6-ACETYLMORPHINE: NOT DETECTED NG/ML
ALCOHOL, ETHYL: NOT DETECTED NG/ML
AMPHET SAL QL CFM: NOT DETECTED NG/ML
AMPHETAMINE: DETECTED NG/ML
BENZODIAZ BLD QL: NOT DETECTED NG/ML
BUPRENORPHINE SAL CFM-MCNC: DETECTED NG/ML
BUPRENORPHINE: NORMAL NG/ML
COCAINE METABOLITE: NOT DETECTED NG/ML
EDDP SERPL QL: NOT DETECTED NG/ML
FENTANYL-EIA: NOT DETECTED NG/ML
GABAPENTIN: NORMAL NG/ML
METHAMPHET/CREAT UR: NORMAL NG/ML
METHAMPHETAMINE: DETECTED NG/ML
NORBUPRENORPHINE: NORMAL NG/ML
OPIATES: NOT DETECTED NG/ML
OXYCODONE: NOT DETECTED NG/ML
PCP: NOT DETECTED NG/ML
PHENTERMINE: NOT DETECTED NG/ML
THC: NOT DETECTED NG/ML
TRICYCLIC ANTIDEPRESSANTS: NOT DETECTED NG/ML

## 2025-08-07 ENCOUNTER — TELEMEDICINE (OUTPATIENT)
Dept: PSYCHIATRY | Facility: CLINIC | Age: 35
End: 2025-08-07
Payer: COMMERCIAL

## 2025-08-07 VITALS — OXYGEN SATURATION: 99 % | SYSTOLIC BLOOD PRESSURE: 118 MMHG | DIASTOLIC BLOOD PRESSURE: 76 MMHG | HEART RATE: 113 BPM

## 2025-08-07 DIAGNOSIS — F15.20 METHAMPHETAMINE DEPENDENCE: ICD-10-CM

## 2025-08-07 DIAGNOSIS — Z79.899 MEDICATION MANAGEMENT: ICD-10-CM

## 2025-08-07 DIAGNOSIS — F11.20 OPIOID TYPE DEPENDENCE, CONTINUOUS: Primary | ICD-10-CM

## 2025-08-07 DIAGNOSIS — F90.0 ATTENTION DEFICIT HYPERACTIVITY DISORDER (ADHD), PREDOMINANTLY INATTENTIVE TYPE: ICD-10-CM

## 2025-08-07 DIAGNOSIS — F41.1 GENERALIZED ANXIETY DISORDER: ICD-10-CM

## 2025-08-07 DIAGNOSIS — R41.840 IMPAIRED CONCENTRATION: ICD-10-CM

## 2025-08-07 DIAGNOSIS — F51.01 PRIMARY INSOMNIA: ICD-10-CM

## 2025-08-07 LAB
AMPHET+METHAMPHET UR QL: POSITIVE
AMPHETAMINES UR QL: POSITIVE
BARBITURATES UR QL SCN: NEGATIVE
BENZODIAZ UR QL SCN: NEGATIVE
BUPRENORPHINE SERPL-MCNC: POSITIVE NG/ML
CANNABINOIDS SERPL QL: NEGATIVE
COCAINE UR QL: NEGATIVE
MDMA UR QL SCN: NEGATIVE
METHADONE UR QL SCN: NEGATIVE
MORPHINE/OPIATES SCREEN, URINE: NEGATIVE
OXYCODONE UR QL SCN: NEGATIVE
PCP UR QL SCN: NEGATIVE
PROPOXYPH UR QL SCN: NEGATIVE
TRICYCLICS UR QL SCN: NEGATIVE

## 2025-08-07 PROCEDURE — 1160F RVW MEDS BY RX/DR IN RCRD: CPT | Performed by: NURSE PRACTITIONER

## 2025-08-07 PROCEDURE — 99214 OFFICE O/P EST MOD 30 MIN: CPT | Performed by: NURSE PRACTITIONER

## 2025-08-07 PROCEDURE — 1159F MED LIST DOCD IN RCRD: CPT | Performed by: NURSE PRACTITIONER

## 2025-08-07 RX ORDER — ATOMOXETINE 40 MG/1
40 CAPSULE ORAL DAILY
Qty: 28 CAPSULE | Refills: 0 | Status: SHIPPED | OUTPATIENT
Start: 2025-08-07

## 2025-08-07 RX ORDER — CLONIDINE HYDROCHLORIDE 0.1 MG/1
0.1 TABLET ORAL 2 TIMES DAILY
Qty: 180 TABLET | Refills: 0 | Status: SHIPPED | OUTPATIENT
Start: 2025-08-07

## 2025-08-07 RX ORDER — QUETIAPINE FUMARATE 50 MG/1
150 TABLET, FILM COATED ORAL NIGHTLY
Qty: 90 TABLET | Refills: 0 | Status: SHIPPED | OUTPATIENT
Start: 2025-08-07

## 2025-08-07 RX ORDER — BUPRENORPHINE HYDROCHLORIDE AND NALOXONE HYDROCHLORIDE DIHYDRATE 8; 2 MG/1; MG/1
1 TABLET SUBLINGUAL DAILY
Qty: 7 TABLET | Refills: 0 | Status: SHIPPED | OUTPATIENT
Start: 2025-08-07

## 2025-08-07 RX ORDER — HYDROXYZINE PAMOATE 25 MG/1
25 CAPSULE ORAL EVERY 12 HOURS SCHEDULED
Qty: 60 CAPSULE | Refills: 0 | Status: SHIPPED | OUTPATIENT
Start: 2025-08-07

## 2025-08-08 LAB
CREATININE: 335.5 MG/DL
Lab: NORMAL
OXIDANTS: >500 UG/ML
PH: 5.5 (ref 4.5–9)
SPECIFIC GRAVITY, QUAN: 1.02 (ref 1–1.03)

## 2025-08-09 LAB — REF LAB TEST METHOD: NORMAL

## 2025-08-19 ENCOUNTER — TELEMEDICINE (OUTPATIENT)
Dept: PSYCHIATRY | Facility: CLINIC | Age: 35
End: 2025-08-19
Payer: COMMERCIAL

## 2025-08-19 VITALS
HEIGHT: 65 IN | DIASTOLIC BLOOD PRESSURE: 68 MMHG | OXYGEN SATURATION: 98 % | HEART RATE: 102 BPM | BODY MASS INDEX: 30.52 KG/M2 | SYSTOLIC BLOOD PRESSURE: 121 MMHG | WEIGHT: 183.2 LBS

## 2025-08-19 DIAGNOSIS — Z79.899 MEDICATION MANAGEMENT: ICD-10-CM

## 2025-08-19 DIAGNOSIS — F11.20 OPIOID TYPE DEPENDENCE, CONTINUOUS: Primary | ICD-10-CM

## 2025-08-19 RX ORDER — BUPRENORPHINE HYDROCHLORIDE AND NALOXONE HYDROCHLORIDE DIHYDRATE 8; 2 MG/1; MG/1
1 TABLET SUBLINGUAL DAILY
Qty: 14 TABLET | Refills: 0 | Status: SHIPPED | OUTPATIENT
Start: 2025-08-19

## 2025-08-21 LAB
6-ACETYLMORPHINE: NOT DETECTED NG/ML
ALCOHOL, ETHYL: NOT DETECTED MG/DL
AMPHETAMINE: NOT DETECTED NG/ML
BENZODIAZ BLD QL: NOT DETECTED NG/ML
BUPRENORPHINE SAL CFM-MCNC: DETECTED NG/ML
BUPRENORPHINE: 320 NG/ML
COCAINE METABOLITE: NOT DETECTED NG/ML
CREATININE: 133.6 MG/DL
EDDP SERPL QL: NOT DETECTED NG/ML
FENTANYL-EIA: NOT DETECTED NG/ML
GABAPENTIN: >2500 NG/ML
METHAMPHETAMINE: NOT DETECTED NG/ML
NORBUPRENORPHINE: 250 NG/ML
OPIATES: NOT DETECTED NG/ML
OXIDANTS: NOT DETECTED UG/ML
OXYCODONE: NOT DETECTED NG/ML
PCP: NOT DETECTED NG/ML
PH: 5.6 (ref 4.5–9)
REF LAB TEST METHOD: NORMAL
SPECIFIC GRAVITY, QUAN: 1.02 (ref 1–1.03)
THC: NOT DETECTED NG/ML

## (undated) DEVICE — SINGLE PORT MANIFOLD: Brand: NEPTUNE 2

## (undated) DEVICE — Device

## (undated) DEVICE — Device: Brand: DEFENDO AIR/WATER/SUCTION AND BIOPSY VALVE

## (undated) DEVICE — ENDOGATOR TUBING FOR ENDOGATOR EGP-100 IRRIGATION PUMP,OLYMPUS OFP PUMP, OLYMPUS AFU-100 PUMP AND ERBE EIP2 PUMP: Brand: ENDOGATOR

## (undated) DEVICE — THE BITE BLOCK MAXI, LATEX FREE STRAP IS USED TO PROTECT THE ENDOSCOPE INSERTION TUBE FROM BEING BITTEN BY THE PATIENT.

## (undated) DEVICE — TUBING, SUCTION, 1/4" X 20', STRAIGHT: Brand: MEDLINE INDUSTRIES, INC.

## (undated) DEVICE — FRCP BX RADJAW4 NDL 2.8 240CM LG OG BX40